# Patient Record
Sex: MALE | Race: WHITE | NOT HISPANIC OR LATINO | Employment: OTHER | URBAN - NONMETROPOLITAN AREA
[De-identification: names, ages, dates, MRNs, and addresses within clinical notes are randomized per-mention and may not be internally consistent; named-entity substitution may affect disease eponyms.]

---

## 2017-01-03 ENCOUNTER — CLINICAL SUPPORT (OUTPATIENT)
Dept: FAMILY MEDICINE CLINIC | Facility: CLINIC | Age: 67
End: 2017-01-03

## 2017-01-03 DIAGNOSIS — E53.8 COBALAMIN DEFICIENCY: Primary | ICD-10-CM

## 2017-01-03 PROCEDURE — 96372 THER/PROPH/DIAG INJ SC/IM: CPT | Performed by: INTERNAL MEDICINE

## 2017-01-03 RX ADMIN — CYANOCOBALAMIN 1000 MCG: 1000 INJECTION, SOLUTION INTRAMUSCULAR; SUBCUTANEOUS at 09:51

## 2017-01-16 ENCOUNTER — TELEPHONE (OUTPATIENT)
Dept: FAMILY MEDICINE CLINIC | Facility: CLINIC | Age: 67
End: 2017-01-16

## 2017-01-16 RX ORDER — AMLODIPINE BESYLATE 10 MG/1
10 TABLET ORAL DAILY
Qty: 90 TABLET | Refills: 3 | Status: SHIPPED | OUTPATIENT
Start: 2017-01-16 | End: 2017-12-26 | Stop reason: SDUPTHER

## 2017-01-16 RX ORDER — SPIRONOLACTONE 25 MG/1
12.5 TABLET ORAL DAILY
Qty: 45 TABLET | Refills: 3 | Status: SHIPPED | OUTPATIENT
Start: 2017-01-16 | End: 2017-01-30 | Stop reason: DRUGHIGH

## 2017-01-16 NOTE — TELEPHONE ENCOUNTER
Requested Refills Sent    ----- Message from Janice Burrell sent at 1/16/2017 10:41 AM CST -----  Regarding: BRANDIN DUBOIS  Contact: [phone]  NETTA CONN IS NEEDING REFILLS ON AMLODIPINE AND SPIRONALACTONE TO BE SENT TO [facility]

## 2017-01-20 ENCOUNTER — OFFICE VISIT (OUTPATIENT)
Dept: OPHTHALMOLOGY | Facility: CLINIC | Age: 67
End: 2017-01-20

## 2017-01-20 DIAGNOSIS — I67.1 CAROTID-CAVERNOUS FISTULA: ICD-10-CM

## 2017-01-20 DIAGNOSIS — H40.41X1 GLAUCOMA OF RIGHT EYE SECONDARY TO EYE INFLAMMATION, MILD STAGE: Primary | ICD-10-CM

## 2017-01-20 PROCEDURE — 99212 OFFICE O/P EST SF 10 MIN: CPT | Performed by: OPHTHALMOLOGY

## 2017-01-20 NOTE — MR AVS SNAPSHOT
"                        Beka Zavaleta   1/20/2017 2:15 PM   Office Visit    Dept Phone:  [phone]   Encounter #:  [id number]    Provider:  Sami Lopez MD   Department:  [facility] OPHTHALMOLOGY                Your Full Care Plan              Today's Medication Changes          These changes are accurate as of: 1/20/17  1:59 PM.  If you have any questions, ask your nurse or doctor.               Stop taking medication(s)listed here:     brimonidine-timolol 0.2-0.5 % ophthalmic solution   Commonly known as:  COMBIGAN   Stopped by:  Sami Lopez MD                      Your Updated Medication List          This list is accurate as of: 1/20/17  1:59 PM.  Always use your most recent med list.                ACCU-CHEK ENDER test strip   Generic drug:  glucose blood       amLODIPine 10 MG tablet   Commonly known as:  NORVASC   Take 1 tablet by mouth Daily.       aspirin 81 MG tablet       atenolol 100 MG tablet   Commonly known as:  TENORMIN   Take 1 tablet by mouth daily. REPLACES THE COMBO MED DUE TO DOSING       atorvastatin 80 MG tablet   Commonly known as:  LIPITOR   Take 1 tablet by mouth Daily.       carBAMazepine  MG 12 hr tablet   Commonly known as:  TEGretol  XR   TAKE 4 TABLETS BY MOUTH EVERY DAY IN THE MORNING, 3 TABLETS EVERY DAY AT SUPPER, AND 3 TABLETS AT NIGHT       colestipol 1 G tablet   Commonly known as:  COLESTID       doxazosin 4 MG tablet   Commonly known as:  CARDURA       ICAPS PO       insulin glargine 100 UNIT/ML injection   Commonly known as:  LANTUS   Inject 176 Units under the skin Daily. (Dosage Increased due to being on Prednisone)       Insulin Syringe-Needle U-100 30G X 7/16\" 1 ML misc       isosorbide mononitrate 30 MG 24 hr tablet   Commonly known as:  IMDUR       lisinopril 40 MG tablet   Commonly known as:  PRINIVIL,ZESTRIL       metFORMIN 500 MG tablet   Commonly known as:  GLUCOPHAGE       nitroglycerin 0.4 MG SL tablet   Commonly known " as:  NITROSTAT       OMEGA-3 FATTY ACIDS-VITAMIN E PO       omeprazole 40 MG capsule   Commonly known as:  priLOSEC   Take 1 capsule by mouth daily.       prasugrel 5 MG tablet   Commonly known as:  EFFIENT       RANEXA 1000 MG 12 hr tablet   Generic drug:  ranolazine       RELION ULTRA THIN PLUS LANCETS misc       SITagliptin 100 MG tablet   Commonly known as:  JANUVIA   Take 1 tablet by mouth Daily.       spironolactone 25 MG tablet   Commonly known as:  ALDACTONE   Take 0.5 tablets by mouth Daily.       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, mild stage    -  Primary ICD-10-CM: H40.41X1  ICD-9-CM: 365.62, 365.71 IOP down    Carotid-cavernous fistula     ICD-10-CM: I77.0  ICD-9-CM: 447.0 low flow improved,  chemosis, 6th CNP resolved;       Medications to be Given to You by a Medical Professional     Due       Frequency    2017 cyanocobalamin injection 1,000 mcg  Every 28 Days      Instructions     None    Patient Instructions History      Upcoming Appointments     Visit Type Date Time Department    OFFICE VISIT 2017  2:15 PM [facility] OPHTHALMOLOGY MAD    FOLLOW UP 2017  8:45 AM [facility] FAM MED MAD 4TH    OFFICE VISIT 2017  8:45 AM [facility] OPHTHALMOLOGY MAD      [facility] Signup     [facility] "[employer] allows you to send messages to your doctor, view your test results, renew your prescriptions, schedule appointments, and more. To sign up, go to [employer] and click on the Sign Up Now link in the New User? box. Enter your "[employer] Activation Code exactly as it appears below along with the last four digits of your Social Security Number and your Date of Birth () to complete the sign-up process. If you do not sign up before the expiration date, you must request a new code.    "[employer] Activation Code: [email or web address]  Expires:  2/3/2017  1:59 PM    If you have questions, you can email [email or web address] or call [phone] to talk to our MyChart staff. Remember, MyChart is NOT to be used for urgent needs. For medical emergencies, dial 911.               Other Info from Your Visit           Your Appointments     Jan 20, 2017  2:15 PM CST   Office Visit with Sami Lopez MD   [facility] OPHTHALMOLOGY (--)    [address] [place] [address] [address]  [address] [place] [id number] [phone]           Arrive 15 minutes prior to appointment.            Feb 13, 2017  8:45 AM CST   Follow Up with Fede Hawkins MD   [facility] FAMILY MEDICINE (--)    [address] [place] [address] [address]  [address] [place] [id number] [phone]           Arrive 15 minutes prior to appointment.            Feb 20, 2017  8:45 AM CST   Office Visit with Sami Lopez MD   [facility] OPHTHALMOLOGY (--)    [address] [place] [address] [address]  [address] [place] [id number] [phone]           Arrive 15 minutes prior to appointment.              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

## 2017-01-20 NOTE — PROGRESS NOTES
Subjective   Beka Zavaleta is a 66 y.o. male.   Chief Complaint   Patient presents with   • Carotid artery covernous sinus fistula     HPI     F/u CC fistula, no diplopia       Last edited by Sami Lopez MD on 1/20/2017  1:54 PM.       Review of Systems    Objective   Visual Acuity (Snellen - Linear)      Right Left   Dist cc 20/40 20/30       Correction:  Glasses                Not recorded            Tonometry      Right Left   Pressure 14               Main Ophthalmology Exam     External Exam      Right Left    External Normal Normal      Slit Lamp Exam      Right Left    Lids/Lashes Normal Normal    Conjunctiva/Sclera White and quiet, no chemosis White and quiet, scar at 10:00    Cornea Clear Clear    Anterior Chamber Deep and quiet Deep and quiet    Iris Round and reactive Round and reactive iris drawn nasal    Lens 1+ Nuclear sclerosis, 2+ Cortical cataract 1+ Nuclear sclerosis, 2+ Cortical cataract    Vitreous Normal Normal                Assessment/Plan   Beka was seen today for carotid artery covernous sinus fistula.    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

## 2017-01-30 ENCOUNTER — TELEPHONE (OUTPATIENT)
Dept: FAMILY MEDICINE CLINIC | Facility: CLINIC | Age: 67
End: 2017-01-30

## 2017-01-30 RX ORDER — SPIRONOLACTONE 50 MG/1
25 TABLET, FILM COATED ORAL
COMMUNITY
End: 2017-03-15 | Stop reason: CLARIF

## 2017-01-30 RX ORDER — CARBAMAZEPINE 100 MG/1
TABLET, EXTENDED RELEASE ORAL
Qty: 300 TABLET | Refills: 5 | Status: SHIPPED | OUTPATIENT
Start: 2017-01-30 | End: 2017-05-12 | Stop reason: CLARIF

## 2017-01-30 RX ORDER — DOXAZOSIN MESYLATE 4 MG/1
4 TABLET ORAL NIGHTLY
Qty: 90 TABLET | Refills: 3 | Status: SHIPPED | OUTPATIENT
Start: 2017-01-30 | End: 2018-01-31 | Stop reason: SDUPTHER

## 2017-01-30 RX ORDER — PRASUGREL 10 MG/1
10 TABLET, FILM COATED ORAL
COMMUNITY
End: 2018-01-09 | Stop reason: CLARIF

## 2017-01-30 NOTE — TELEPHONE ENCOUNTER
I have called the Pharmacy.  The Tegretol XR does not come in capsule form.  I called the patient to see what they were needing and she said the Pharmacy told her to call for a script for capsules.  But it does not come that way.   Refills sent for his usual refills in tablet form.     ----- Message from Janice Burrell sent at 1/30/2017  9:04 AM CST -----  Regarding: BRANDIN DUBOIS  Contact: [phone]  NETTA CONN IS NEEDING A NEW PRESP FOR HIS TREGRETOL...IT NO LONGER COMES IN TAB.he NEEDS A PRESP FOR CAPSULES.Also, NEEDS REFILL ON DOXAZOSIN 4MG...PLEASE SEND TO [facility] TODAY..

## 2017-02-01 ENCOUNTER — CLINICAL SUPPORT (OUTPATIENT)
Dept: FAMILY MEDICINE CLINIC | Facility: CLINIC | Age: 67
End: 2017-02-01

## 2017-02-01 DIAGNOSIS — E53.8 COBALAMIN DEFICIENCY: Primary | ICD-10-CM

## 2017-02-01 PROCEDURE — 96372 THER/PROPH/DIAG INJ SC/IM: CPT | Performed by: INTERNAL MEDICINE

## 2017-02-01 RX ADMIN — CYANOCOBALAMIN 1000 MCG: 1000 INJECTION, SOLUTION INTRAMUSCULAR; SUBCUTANEOUS at 14:12

## 2017-02-02 ENCOUNTER — TELEPHONE (OUTPATIENT)
Dept: FAMILY MEDICINE CLINIC | Facility: CLINIC | Age: 67
End: 2017-02-02

## 2017-02-02 RX ORDER — ISOSORBIDE MONONITRATE 30 MG/1
30 TABLET, EXTENDED RELEASE ORAL DAILY
Qty: 30 TABLET | Refills: 5 | Status: SHIPPED | OUTPATIENT
Start: 2017-02-02

## 2017-02-02 RX ORDER — OMEPRAZOLE 40 MG/1
40 CAPSULE, DELAYED RELEASE ORAL DAILY
Qty: 30 CAPSULE | Refills: 5 | Status: SHIPPED | OUTPATIENT
Start: 2017-02-02 | End: 2017-08-17 | Stop reason: SDUPTHER

## 2017-02-02 RX ORDER — LISINOPRIL 40 MG/1
40 TABLET ORAL 2 TIMES DAILY
Qty: 60 TABLET | Refills: 5 | Status: SHIPPED | OUTPATIENT
Start: 2017-02-02 | End: 2017-07-05 | Stop reason: SDUPTHER

## 2017-02-02 RX ORDER — ATENOLOL 100 MG/1
100 TABLET ORAL DAILY
Qty: 30 TABLET | Refills: 5 | Status: SHIPPED | OUTPATIENT
Start: 2017-02-02 | End: 2017-02-15 | Stop reason: SDUPTHER

## 2017-02-02 NOTE — TELEPHONE ENCOUNTER
----- Message from Monica Pearson sent at 2/2/2017  8:36 AM CST -----  Regarding: rx  Contact: [phone]  Ross Grubbs,/ Needs his Omeprazole called to the [facility] in [place] please/

## 2017-02-13 ENCOUNTER — OFFICE VISIT (OUTPATIENT)
Dept: FAMILY MEDICINE CLINIC | Facility: CLINIC | Age: 67
End: 2017-02-13

## 2017-02-13 VITALS
WEIGHT: 225.2 LBS | HEART RATE: 64 BPM | DIASTOLIC BLOOD PRESSURE: 74 MMHG | BODY MASS INDEX: 30.54 KG/M2 | OXYGEN SATURATION: 99 % | SYSTOLIC BLOOD PRESSURE: 130 MMHG

## 2017-02-13 DIAGNOSIS — I25.10 ATHEROSCLEROSIS OF NATIVE CORONARY ARTERY OF NATIVE HEART WITHOUT ANGINA PECTORIS: ICD-10-CM

## 2017-02-13 DIAGNOSIS — Z13.6 ENCOUNTER FOR ABDOMINAL AORTIC ANEURYSM (AAA) SCREENING: Primary | ICD-10-CM

## 2017-02-13 DIAGNOSIS — I10 ESSENTIAL HYPERTENSION: ICD-10-CM

## 2017-02-13 DIAGNOSIS — E11.65 UNCONTROLLED TYPE 2 DIABETES MELLITUS WITH HYPERGLYCEMIA, WITH LONG-TERM CURRENT USE OF INSULIN (HCC): ICD-10-CM

## 2017-02-13 DIAGNOSIS — Z79.4 UNCONTROLLED TYPE 2 DIABETES MELLITUS WITH HYPERGLYCEMIA, WITH LONG-TERM CURRENT USE OF INSULIN (HCC): ICD-10-CM

## 2017-02-13 DIAGNOSIS — Z86.69 HISTORY OF TRIGEMINAL NEURALGIA: ICD-10-CM

## 2017-02-13 DIAGNOSIS — G47.33 OBSTRUCTIVE SLEEP APNEA SYNDROME: ICD-10-CM

## 2017-02-13 PROCEDURE — 99214 OFFICE O/P EST MOD 30 MIN: CPT | Performed by: FAMILY MEDICINE

## 2017-02-13 NOTE — PROGRESS NOTES
Subjective   Beka Zavaleta is a 66 y.o. male.     Hypertension   This is a chronic problem. The problem is unchanged. The problem is controlled. Pertinent negatives include no chest pain, orthopnea, palpitations, peripheral edema, PND or shortness of breath.   Diabetes   He presents for his follow-up diabetic visit. He has type 2 diabetes mellitus. His disease course has been fluctuating. Pertinent negatives for hypoglycemia include no confusion or hunger. Pertinent negatives for diabetes include no chest pain, no foot paresthesias, no foot ulcerations, no polydipsia, no polyuria and no weight loss. His breakfast blood glucose is taken between 7-8 am. His breakfast blood glucose range is generally 140-180 mg/dl. His highest blood glucose is >200 mg/dl. An ACE inhibitor/angiotensin II receptor blocker is being taken. Eye exam is current.   Coronary Artery Disease   Presents for follow-up visit. Pertinent negatives include no chest pain, chest pressure, chest tightness, leg swelling, palpitations or shortness of breath.        The following portions of the patient's history were reviewed and updated as appropriate: allergies, current medications, past family history, past medical history, past social history, past surgical history and problem list.    Review of Systems   Constitutional: Negative for weight loss.   Respiratory: Negative for chest tightness and shortness of breath.    Cardiovascular: Negative for chest pain, palpitations, orthopnea, leg swelling and PND.   Endocrine: Negative for polydipsia and polyuria.   Psychiatric/Behavioral: Negative for confusion.       Objective   Physical Exam   Constitutional: He is oriented to person, place, and time. He appears well-developed and well-nourished. No distress.   HENT:   Head: Normocephalic and atraumatic.   Cardiovascular: Normal rate, regular rhythm, normal heart sounds and intact distal pulses.  Exam reveals no gallop and no friction rub.    No murmur  heard.  Pulmonary/Chest: Effort normal and breath sounds normal. No respiratory distress. He has no wheezes. He has no rales. He exhibits no tenderness.   Musculoskeletal: He exhibits no edema.    Beka had a diabetic foot exam performed (callus noted) today.   During the foot exam he had a monofilament test performed (normal).    Vascular Status -  His exam exhibits right foot vasculature abnormal and no right foot edema. His exam exhibits left foot vasculature abnormal and no left foot edema.  Neurological: He is alert and oriented to person, place, and time.   Skin: Skin is warm and dry. He is not diaphoretic.   Psychiatric: He has a normal mood and affect. His behavior is normal. Judgment and thought content normal.   Nursing note and vitals reviewed.      Assessment/Plan   Problems Addressed this Visit        Cardiovascular and Mediastinum    Essential hypertension    Coronary atherosclerosis       Respiratory    Obstructive sleep apnea syndrome       Endocrine    Type II diabetes mellitus, uncontrolled       Other    History of trigeminal neuralgia      Other Visit Diagnoses     Encounter for abdominal aortic aneurysm (AAA) screening    -  Primary    Relevant Orders    US Abdominal Aorta Limited               Has been on  Tegretol since 1990's for trigeminal neuralgia. Needs PA

## 2017-02-15 ENCOUNTER — TELEPHONE (OUTPATIENT)
Dept: FAMILY MEDICINE CLINIC | Facility: CLINIC | Age: 67
End: 2017-02-15

## 2017-02-15 RX ORDER — ATENOLOL 100 MG/1
100 TABLET ORAL DAILY
Qty: 90 TABLET | Refills: 3 | Status: SHIPPED | OUTPATIENT
Start: 2017-02-15 | End: 2018-02-15 | Stop reason: SDUPTHER

## 2017-02-15 NOTE — TELEPHONE ENCOUNTER
Requested Refills Sent    ---- Message from Isabella Crowell sent at 2/15/2017 10:25 AM CST -----  Contact: [phone]  BRANDIN DUBOIS- HE NEEDS PRES FOR HIS ATENOLOL-WOULD LIKE 90 DAY SUPPLY SENT TO [facility] IN [place] AND CAN BE REACHED [phone]

## 2017-02-23 ENCOUNTER — OFFICE VISIT (OUTPATIENT)
Dept: OPHTHALMOLOGY | Facility: CLINIC | Age: 67
End: 2017-02-23

## 2017-02-23 ENCOUNTER — HOSPITAL ENCOUNTER (OUTPATIENT)
Dept: ULTRASOUND IMAGING | Facility: HOSPITAL | Age: 67
Discharge: HOME | End: 2017-02-23
Attending: FAMILY MEDICINE | Admitting: FAMILY MEDICINE

## 2017-02-23 DIAGNOSIS — Z13.6 ENCOUNTER FOR ABDOMINAL AORTIC ANEURYSM (AAA) SCREENING: ICD-10-CM

## 2017-02-23 DIAGNOSIS — IMO0002 NUCLEAR CATARACT, BILATERAL: ICD-10-CM

## 2017-02-23 DIAGNOSIS — H40.41X1 GLAUCOMA OF RIGHT EYE SECONDARY TO EYE INFLAMMATION, MILD STAGE: Primary | ICD-10-CM

## 2017-02-23 DIAGNOSIS — H25.013 CORTICAL AGE-RELATED CATARACT, BILATERAL: ICD-10-CM

## 2017-02-23 PROCEDURE — 99212 OFFICE O/P EST SF 10 MIN: CPT | Performed by: OPHTHALMOLOGY

## 2017-02-23 PROCEDURE — 76775 US EXAM ABDO BACK WALL LIM: CPT

## 2017-02-23 NOTE — PROGRESS NOTES
Subjective   Beka Zavaleta is a 66 y.o. male.   Chief Complaint   Patient presents with   • Cataract   • Glaucoma     HPI     Cataract   Laterality: both eyes   Course: stable           Glaucoma   Laterality: right eye           Comments   No vision change; using Travatan OD; hx of low flow CC fistula OD with 6 CNP, resolved       Last edited by Sami Lopez MD on 2/23/2017 11:18 AM. (History)          Review of Systems    Objective   Visual Acuity (Snellen - Linear)      Right Left   Dist cc 20/25 20/25       Correction:  Glasses                Not recorded            Tonometry (Applanation, 11:15 AM)      Right Left   Pressure 15 19              Main Ophthalmology Exam     External Exam      Right Left    External Normal Normal      Slit Lamp Exam      Right Left    Lids/Lashes Normal Normal    Conjunctiva/Sclera White and quiet, no chemosis White and quiet, scar at 10:00    Cornea Clear Clear    Anterior Chamber Deep and quiet Deep and quiet    Iris Round and reactive Round and reactive iris drawn nasal    Lens 1+ Nuclear sclerosis, 2+ Cortical cataract 1+ Nuclear sclerosis, 2+ Cortical cataract    Vitreous Normal Normal                Assessment/Plan   Diagnoses and all orders for this visit:    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

## 2017-03-02 ENCOUNTER — CLINICAL SUPPORT (OUTPATIENT)
Dept: FAMILY MEDICINE CLINIC | Facility: CLINIC | Age: 67
End: 2017-03-02

## 2017-03-02 DIAGNOSIS — D51.0 PERNICIOUS ANEMIA: Primary | ICD-10-CM

## 2017-03-02 PROCEDURE — 96372 THER/PROPH/DIAG INJ SC/IM: CPT | Performed by: INTERNAL MEDICINE

## 2017-03-02 RX ADMIN — CYANOCOBALAMIN 1000 MCG: 1000 INJECTION, SOLUTION INTRAMUSCULAR; SUBCUTANEOUS at 10:28

## 2017-03-15 ENCOUNTER — TELEPHONE (OUTPATIENT)
Dept: FAMILY MEDICINE CLINIC | Facility: CLINIC | Age: 67
End: 2017-03-15

## 2017-03-15 RX ORDER — SPIRONOLACTONE 25 MG/1
25 TABLET ORAL DAILY
Qty: 90 TABLET | Refills: 3 | Status: SHIPPED | OUTPATIENT
Start: 2017-03-15 | End: 2018-05-21 | Stop reason: SDUPTHER

## 2017-03-15 NOTE — TELEPHONE ENCOUNTER
Requested Refills Sent  Patient called to confirm that he is taking Spironolactone 25 mg Take 1 tablet daily.   New Script sent.     ---- Message from Isabella Crowell sent at 3/15/2017 10:15 AM CDT -----  Contact: [phone]  BRANDIN SOTO-WIFE CALLED FOR HIM AND SAID HE NEEDS PRES FOR SPIRONOLACTONE 25MG-SAID HE WAS TAKING 1/2 PILL A DAY AND NOW HE TAKES 1 WHOLE PILL A DAY-USES [facility] IN [place] AND CAN BE REACHED [phone] IF ANY QUESTIONS.

## 2017-03-23 ENCOUNTER — OFFICE VISIT (OUTPATIENT)
Dept: OPHTHALMOLOGY | Facility: CLINIC | Age: 67
End: 2017-03-23

## 2017-03-23 DIAGNOSIS — IMO0002 NUCLEAR CATARACT, BILATERAL: ICD-10-CM

## 2017-03-23 DIAGNOSIS — H40.41X1 GLAUCOMA OF RIGHT EYE SECONDARY TO EYE INFLAMMATION, MILD STAGE: Primary | ICD-10-CM

## 2017-03-23 DIAGNOSIS — H25.013 CORTICAL AGE-RELATED CATARACT, BILATERAL: ICD-10-CM

## 2017-03-23 PROCEDURE — 99212 OFFICE O/P EST SF 10 MIN: CPT | Performed by: OPHTHALMOLOGY

## 2017-03-23 NOTE — PROGRESS NOTES
Subjective   Beka Zavaleta is a 66 y.o. male.   Chief Complaint   Patient presents with   • Follow-up     HPI     Stopped Travatan Z, no complaints, hx of low flow CC fistula OD       Last edited by Sami Lopez MD on 3/23/2017  8:37 AM.       Review of Systems    Objective   Visual Acuity (Snellen - Linear)      Right Left   Dist cc 20/25 20/25       Correction:  Glasses         Wearing Rx      Sphere Cylinder Axis Add   Right -1.00 +1.00 029 +2.25   Left -0.25 +0.50 139 +2.25                  Not recorded            Tonometry (Applanation, 8:34 AM)      Right Left   Pressure 19 17              Main Ophthalmology Exam     External Exam      Right Left    External Normal Normal      Slit Lamp Exam      Right Left    Lids/Lashes Normal Normal    Conjunctiva/Sclera White and quiet, no chemosis White and quiet, scar at 10:00    Cornea Clear Clear    Anterior Chamber Deep and quiet Deep and quiet    Iris Round and reactive Round and reactive iris drawn nasal    Lens 1+ Nuclear sclerosis, 2+ Cortical cataract 1+ Nuclear sclerosis, 2+ Cortical cataract    Vitreous Normal Normal                Assessment/Plan   Diagnoses and all orders for this visit:    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

## 2017-03-25 ENCOUNTER — APPOINTMENT (OUTPATIENT)
Dept: GENERAL RADIOLOGY | Age: 67
End: 2017-03-25
Payer: MEDICARE

## 2017-03-25 ENCOUNTER — HOSPITAL ENCOUNTER (EMERGENCY)
Age: 67
Discharge: HOME | End: 2017-03-25
Attending: EMERGENCY MEDICINE
Payer: MEDICARE

## 2017-03-25 VITALS
HEART RATE: 78 BPM | OXYGEN SATURATION: 99 % | DIASTOLIC BLOOD PRESSURE: 83 MMHG | BODY MASS INDEX: 30.48 KG/M2 | WEIGHT: 225 LBS | HEIGHT: 72 IN | TEMPERATURE: 98.3 F | SYSTOLIC BLOOD PRESSURE: 144 MMHG | RESPIRATION RATE: 18 BRPM

## 2017-03-25 DIAGNOSIS — I49.1 PAC (PREMATURE ATRIAL CONTRACTION): ICD-10-CM

## 2017-03-25 DIAGNOSIS — R00.2 PALPITATIONS: Primary | ICD-10-CM

## 2017-03-25 LAB
ALBUMIN SERPL-MCNC: 4.3 G/DL (ref 3.5–5.2)
ALP BLD-CCNC: 64 U/L (ref 40–130)
ALT SERPL-CCNC: 22 U/L (ref 5–41)
ANION GAP SERPL CALCULATED.3IONS-SCNC: 13 MMOL/L (ref 7–19)
AST SERPL-CCNC: 22 U/L (ref 5–40)
BASOPHILS ABSOLUTE: 0 K/UL (ref 0–0.2)
BASOPHILS RELATIVE PERCENT: 0.2 % (ref 0–1)
BILIRUB SERPL-MCNC: 0.3 MG/DL (ref 0.2–1.2)
BUN BLDV-MCNC: 19 MG/DL (ref 8–23)
CALCIUM SERPL-MCNC: 8.8 MG/DL (ref 8.8–10.2)
CHLORIDE BLD-SCNC: 104 MMOL/L (ref 98–111)
CO2: 24 MMOL/L (ref 22–29)
CREAT SERPL-MCNC: 0.8 MG/DL (ref 0.5–1.2)
EOSINOPHILS ABSOLUTE: 0.1 K/UL (ref 0–0.6)
EOSINOPHILS RELATIVE PERCENT: 0.9 % (ref 0–5)
GFR NON-AFRICAN AMERICAN: >60
GLOBULIN: 2.7 G/DL
GLUCOSE BLD-MCNC: 203 MG/DL (ref 74–109)
HCT VFR BLD CALC: 41.7 % (ref 42–52)
HEMOGLOBIN: 14.1 G/DL (ref 14–18)
INR BLD: 0.97 (ref 0.88–1.18)
LYMPHOCYTES ABSOLUTE: 2.1 K/UL (ref 1.1–4.5)
LYMPHOCYTES RELATIVE PERCENT: 38.3 % (ref 20–40)
MCH RBC QN AUTO: 32.4 PG (ref 27–31)
MCHC RBC AUTO-ENTMCNC: 33.8 G/DL (ref 33–37)
MCV RBC AUTO: 95.9 FL (ref 80–94)
MONOCYTES ABSOLUTE: 0.6 K/UL (ref 0–0.9)
MONOCYTES RELATIVE PERCENT: 11.7 % (ref 0–10)
NEUTROPHILS ABSOLUTE: 2.7 K/UL (ref 1.5–7.5)
NEUTROPHILS RELATIVE PERCENT: 48.7 % (ref 50–65)
PDW BLD-RTO: 12.3 % (ref 11.5–14.5)
PERFORMED ON: NORMAL
PERFORMED ON: NORMAL
PLATELET # BLD: 160 K/UL (ref 130–400)
PMV BLD AUTO: 10.8 FL (ref 7.4–10.4)
POC TROPONIN I: 0 NG/ML (ref 0–0.08)
POC TROPONIN I: 0.01 NG/ML (ref 0–0.08)
POTASSIUM SERPL-SCNC: 4.2 MMOL/L (ref 3.5–5)
PRO-BNP: 104 PG/ML (ref 0–900)
PROTHROMBIN TIME: 12.9 SEC (ref 12–14.6)
RBC # BLD: 4.35 M/UL (ref 4.7–6.1)
SODIUM BLD-SCNC: 141 MMOL/L (ref 136–145)
TOTAL PROTEIN: 7 G/DL (ref 6.6–8.7)
WBC # BLD: 5.5 K/UL (ref 4.8–10.8)

## 2017-03-25 PROCEDURE — 71020 XR CHEST STANDARD TWO VW: CPT

## 2017-03-25 PROCEDURE — 85025 COMPLETE CBC W/AUTO DIFF WBC: CPT

## 2017-03-25 PROCEDURE — 83880 ASSAY OF NATRIURETIC PEPTIDE: CPT

## 2017-03-25 PROCEDURE — 2500000003 HC RX 250 WO HCPCS: Performed by: EMERGENCY MEDICINE

## 2017-03-25 PROCEDURE — 84484 ASSAY OF TROPONIN QUANT: CPT

## 2017-03-25 PROCEDURE — 99283 EMERGENCY DEPT VISIT LOW MDM: CPT | Performed by: EMERGENCY MEDICINE

## 2017-03-25 PROCEDURE — 6370000000 HC RX 637 (ALT 250 FOR IP): Performed by: EMERGENCY MEDICINE

## 2017-03-25 PROCEDURE — 96374 THER/PROPH/DIAG INJ IV PUSH: CPT

## 2017-03-25 PROCEDURE — 85610 PROTHROMBIN TIME: CPT

## 2017-03-25 PROCEDURE — 36415 COLL VENOUS BLD VENIPUNCTURE: CPT

## 2017-03-25 PROCEDURE — 93005 ELECTROCARDIOGRAM TRACING: CPT

## 2017-03-25 PROCEDURE — 80053 COMPREHEN METABOLIC PANEL: CPT

## 2017-03-25 PROCEDURE — 99285 EMERGENCY DEPT VISIT HI MDM: CPT

## 2017-03-25 RX ORDER — PRASUGREL 10 MG/1
10 TABLET, FILM COATED ORAL DAILY
COMMUNITY
End: 2020-10-07

## 2017-03-25 RX ORDER — CHLORAL HYDRATE 500 MG
3000 CAPSULE ORAL 2 TIMES DAILY
COMMUNITY

## 2017-03-25 RX ORDER — MONTELUKAST SODIUM 4 MG/1
1 TABLET, CHEWABLE ORAL 2 TIMES DAILY
COMMUNITY

## 2017-03-25 RX ORDER — ATENOLOL 100 MG/1
100 TABLET ORAL DAILY
COMMUNITY

## 2017-03-25 RX ORDER — ASPIRIN 81 MG/1
81 TABLET ORAL DAILY
COMMUNITY

## 2017-03-25 RX ORDER — ISOSORBIDE MONONITRATE 30 MG/1
15 TABLET, EXTENDED RELEASE ORAL DAILY
COMMUNITY

## 2017-03-25 RX ORDER — CARBAMAZEPINE 100 MG/1
300 TABLET, CHEWABLE ORAL
COMMUNITY

## 2017-03-25 RX ORDER — LISINOPRIL 40 MG/1
40 TABLET ORAL 2 TIMES DAILY
Status: ON HOLD | COMMUNITY
End: 2020-10-09 | Stop reason: SDUPTHER

## 2017-03-25 RX ORDER — AMLODIPINE BESYLATE 10 MG/1
10 TABLET ORAL DAILY
COMMUNITY

## 2017-03-25 RX ORDER — CARBAMAZEPINE 100 MG/1
300 TABLET, CHEWABLE ORAL NIGHTLY
Status: ON HOLD | COMMUNITY
End: 2020-10-09 | Stop reason: HOSPADM

## 2017-03-25 RX ORDER — ATORVASTATIN CALCIUM 80 MG/1
80 TABLET, FILM COATED ORAL DAILY
COMMUNITY

## 2017-03-25 RX ORDER — SPIRONOLACTONE 25 MG/1
25 TABLET ORAL DAILY
COMMUNITY

## 2017-03-25 RX ORDER — OMEPRAZOLE 20 MG/1
40 CAPSULE, DELAYED RELEASE ORAL DAILY
COMMUNITY

## 2017-03-25 RX ORDER — INSULIN GLARGINE 100 [IU]/ML
200 INJECTION, SOLUTION SUBCUTANEOUS
COMMUNITY

## 2017-03-25 RX ORDER — ASPIRIN 81 MG/1
324 TABLET, CHEWABLE ORAL ONCE
Status: COMPLETED | OUTPATIENT
Start: 2017-03-25 | End: 2017-03-25

## 2017-03-25 RX ORDER — DOXAZOSIN MESYLATE 4 MG/1
4 TABLET ORAL NIGHTLY
COMMUNITY

## 2017-03-25 RX ORDER — CARBAMAZEPINE 100 MG/1
100 TABLET, EXTENDED RELEASE ORAL
COMMUNITY

## 2017-03-25 RX ORDER — DOCUSATE SODIUM 100 MG/1
100 CAPSULE, LIQUID FILLED ORAL 2 TIMES DAILY
COMMUNITY

## 2017-03-25 RX ADMIN — METOPROLOL TARTRATE 5 MG: 5 INJECTION INTRAVENOUS at 06:27

## 2017-03-25 RX ADMIN — ASPIRIN 81 MG CHEWABLE TABLET 324 MG: 81 TABLET CHEWABLE at 06:27

## 2017-03-25 ASSESSMENT — ENCOUNTER SYMPTOMS
COUGH: 0
SHORTNESS OF BREATH: 0
ABDOMINAL PAIN: 0
NAUSEA: 0
VOMITING: 0

## 2017-03-28 LAB
EKG P AXIS: -21 DEGREES
EKG P AXIS: 27 DEGREES
EKG P-R INTERVAL: 128 MS
EKG P-R INTERVAL: 180 MS
EKG Q-T INTERVAL: 362 MS
EKG Q-T INTERVAL: 370 MS
EKG QRS DURATION: 90 MS
EKG QRS DURATION: 92 MS
EKG QTC CALCULATION (BAZETT): 388 MS
EKG QTC CALCULATION (BAZETT): 396 MS
EKG T AXIS: 33 DEGREES
EKG T AXIS: 39 DEGREES

## 2017-03-29 ENCOUNTER — OFFICE VISIT (OUTPATIENT)
Dept: FAMILY MEDICINE CLINIC | Facility: CLINIC | Age: 67
End: 2017-03-29

## 2017-03-29 ENCOUNTER — LAB (OUTPATIENT)
Dept: LAB | Facility: HOSPITAL | Age: 67
End: 2017-03-29

## 2017-03-29 VITALS
SYSTOLIC BLOOD PRESSURE: 130 MMHG | HEART RATE: 65 BPM | DIASTOLIC BLOOD PRESSURE: 84 MMHG | OXYGEN SATURATION: 99 % | BODY MASS INDEX: 30.65 KG/M2 | WEIGHT: 226 LBS

## 2017-03-29 DIAGNOSIS — R00.2 PALPITATION: ICD-10-CM

## 2017-03-29 DIAGNOSIS — E11.9 TYPE 2 DIABETES MELLITUS WITHOUT COMPLICATION, UNSPECIFIED LONG TERM INSULIN USE STATUS: ICD-10-CM

## 2017-03-29 DIAGNOSIS — R00.2 PALPITATION: Primary | ICD-10-CM

## 2017-03-29 LAB
ALBUMIN SERPL-MCNC: 4.3 G/DL (ref 3.4–4.8)
ALBUMIN/GLOB SERPL: 1.5 G/DL (ref 1.1–1.8)
ALP SERPL-CCNC: 68 U/L (ref 38–126)
ALT SERPL W P-5'-P-CCNC: 33 U/L (ref 21–72)
ANION GAP SERPL CALCULATED.3IONS-SCNC: 12 MMOL/L (ref 5–15)
AST SERPL-CCNC: 80 U/L (ref 17–59)
BASOPHILS # BLD AUTO: 0.01 10*3/MM3 (ref 0–0.2)
BASOPHILS NFR BLD AUTO: 0.2 % (ref 0–2)
BILIRUB SERPL-MCNC: 0.4 MG/DL (ref 0.2–1.3)
BUN BLD-MCNC: 16 MG/DL (ref 7–21)
BUN/CREAT SERPL: 17.4 (ref 7–25)
CALCIUM SPEC-SCNC: 9 MG/DL (ref 8.4–10.2)
CHLORIDE SERPL-SCNC: 102 MMOL/L (ref 95–110)
CO2 SERPL-SCNC: 25 MMOL/L (ref 22–31)
CREAT BLD-MCNC: 0.92 MG/DL (ref 0.7–1.3)
DEPRECATED RDW RBC AUTO: 41.2 FL (ref 35.1–43.9)
EOSINOPHIL # BLD AUTO: 0.05 10*3/MM3 (ref 0–0.7)
EOSINOPHIL NFR BLD AUTO: 0.8 % (ref 0–7)
ERYTHROCYTE [DISTWIDTH] IN BLOOD BY AUTOMATED COUNT: 12.1 % (ref 11.5–14.5)
GFR SERPL CREATININE-BSD FRML MDRD: 82 ML/MIN/1.73 (ref 49–113)
GLOBULIN UR ELPH-MCNC: 2.8 GM/DL (ref 2.3–3.5)
GLUCOSE BLD-MCNC: 163 MG/DL (ref 60–100)
HCT VFR BLD AUTO: 38.3 % (ref 39–49)
HGB BLD-MCNC: 13.4 G/DL (ref 13.7–17.3)
IMM GRANULOCYTES # BLD: 0.01 10*3/MM3 (ref 0–0.02)
IMM GRANULOCYTES NFR BLD: 0.2 % (ref 0–0.5)
LYMPHOCYTES # BLD AUTO: 2.17 10*3/MM3 (ref 0.6–4.2)
LYMPHOCYTES NFR BLD AUTO: 36.2 % (ref 10–50)
MAGNESIUM SERPL-MCNC: 1.7 MG/DL (ref 1.6–2.3)
MCH RBC QN AUTO: 32.4 PG (ref 26.5–34)
MCHC RBC AUTO-ENTMCNC: 35 G/DL (ref 31.5–36.3)
MCV RBC AUTO: 92.5 FL (ref 80–98)
MONOCYTES # BLD AUTO: 0.53 10*3/MM3 (ref 0–0.9)
MONOCYTES NFR BLD AUTO: 8.8 % (ref 0–12)
NEUTROPHILS # BLD AUTO: 3.22 10*3/MM3 (ref 2–8.6)
NEUTROPHILS NFR BLD AUTO: 53.8 % (ref 37–80)
PLATELET # BLD AUTO: 173 10*3/MM3 (ref 150–450)
PMV BLD AUTO: 10.8 FL (ref 8–12)
POTASSIUM BLD-SCNC: 4.4 MMOL/L (ref 3.5–5.1)
PROT SERPL-MCNC: 7.1 G/DL (ref 6.3–8.6)
RBC # BLD AUTO: 4.14 10*6/MM3 (ref 4.37–5.74)
SODIUM BLD-SCNC: 139 MMOL/L (ref 137–145)
T4 FREE SERPL-MCNC: 0.91 NG/DL (ref 0.78–2.19)
TSH SERPL DL<=0.05 MIU/L-ACNC: 0.69 MIU/ML (ref 0.46–4.68)
WBC NRBC COR # BLD: 5.99 10*3/MM3 (ref 3.2–9.8)

## 2017-03-29 PROCEDURE — 83735 ASSAY OF MAGNESIUM: CPT | Performed by: FAMILY MEDICINE

## 2017-03-29 PROCEDURE — 80053 COMPREHEN METABOLIC PANEL: CPT | Performed by: FAMILY MEDICINE

## 2017-03-29 PROCEDURE — 85025 COMPLETE CBC W/AUTO DIFF WBC: CPT | Performed by: FAMILY MEDICINE

## 2017-03-29 PROCEDURE — 99213 OFFICE O/P EST LOW 20 MIN: CPT | Performed by: FAMILY MEDICINE

## 2017-03-29 PROCEDURE — 84439 ASSAY OF FREE THYROXINE: CPT | Performed by: FAMILY MEDICINE

## 2017-03-29 PROCEDURE — 36415 COLL VENOUS BLD VENIPUNCTURE: CPT | Performed by: FAMILY MEDICINE

## 2017-03-29 PROCEDURE — 84443 ASSAY THYROID STIM HORMONE: CPT | Performed by: FAMILY MEDICINE

## 2017-03-29 RX ORDER — INSULIN GLARGINE 100 [IU]/ML
176 INJECTION, SOLUTION SUBCUTANEOUS DAILY
Qty: 12 ML | Refills: 3 | Status: ON HOLD | OUTPATIENT
Start: 2017-03-29 | End: 2017-04-12 | Stop reason: SDUPTHER

## 2017-03-29 NOTE — PROGRESS NOTES
Subjective   Beka Zavaleta is a 66 y.o. male.     Hypertension   This is a chronic problem. The current episode started more than 1 year ago. The problem is controlled. Associated symptoms include palpitations and shortness of breath. Pertinent negatives include no chest pain.   Palpitations    This is a recurrent (pACs in ER at [place]) problem. The current episode started 1 to 4 weeks ago. The problem occurs constantly. The problem has been waxing and waning. Nothing aggravates the symptoms. Associated symptoms include an irregular heartbeat and shortness of breath. Pertinent negatives include no chest pain, dizziness, near-syncope, syncope or weakness.        The following portions of the patient's history were reviewed and updated as appropriate: allergies, current medications, past family history, past medical history, past social history, past surgical history and problem list.    Review of Systems   Respiratory: Positive for shortness of breath.    Cardiovascular: Positive for palpitations. Negative for chest pain, syncope and near-syncope.   Neurological: Negative for dizziness and weakness.       Objective   Physical Exam   Constitutional: He is oriented to person, place, and time. He appears well-developed and well-nourished. No distress.   HENT:   Head: Normocephalic and atraumatic.   Cardiovascular: Normal rate, regular rhythm and normal heart sounds.  Exam reveals no gallop and no friction rub.    No murmur heard.  Pulmonary/Chest: Effort normal and breath sounds normal. No respiratory distress. He has no wheezes. He has no rales. He exhibits no tenderness.   Neurological: He is alert and oriented to person, place, and time.   Skin: Skin is warm and dry. He is not diaphoretic.   Psychiatric: He has a normal mood and affect. His behavior is normal. Judgment and thought content normal.   Nursing note and vitals reviewed.      Assessment/Plan   Problems Addressed this Visit        Cardiovascular and  Mediastinum    Palpitation - Primary    Relevant Orders    Comprehensive Metabolic Panel    CBC Auto Differential    TSH    T4, free    Magnesium      Other Visit Diagnoses     Type 2 diabetes mellitus without complication, unspecified long term insulin use status        Relevant Medications    insulin glargine (LANTUS) 100 UNIT/ML injection            PACs on EKG in ER at [place]. To see Cardiology in AM in [place]

## 2017-03-29 NOTE — PROGRESS NOTES
Subjective   Beka Zavaleta is a 66 y.o. male.     Hypertension   This is a chronic problem. The problem is unchanged. The problem is controlled. Pertinent negatives include no chest pain, orthopnea, palpitations, peripheral edema, PND or shortness of breath.   Diabetes   He presents for his follow-up diabetic visit. He has type 2 diabetes mellitus. His disease course has been fluctuating. Pertinent negatives for hypoglycemia include no confusion or hunger. Pertinent negatives for diabetes include no chest pain, no foot paresthesias, no foot ulcerations, no polydipsia, no polyuria and no weight loss. His breakfast blood glucose is taken between 7-8 am. His breakfast blood glucose range is generally 140-180 mg/dl. His highest blood glucose is >200 mg/dl. An ACE inhibitor/angiotensin II receptor blocker is being taken. Eye exam is current.   Coronary Artery Disease   Presents for follow-up visit. Pertinent negatives include no chest pain, chest pressure, chest tightness, leg swelling, palpitations or shortness of breath.        The following portions of the patient's history were reviewed and updated as appropriate: allergies, current medications, past family history, past medical history, past social history, past surgical history and problem list.    Review of Systems   Constitutional: Negative for weight loss.   Respiratory: Negative for chest tightness and shortness of breath.    Cardiovascular: Negative for chest pain, palpitations, orthopnea, leg swelling and PND.   Endocrine: Negative for polydipsia and polyuria.   Psychiatric/Behavioral: Negative for confusion.       Objective   Physical Exam   Constitutional: He is oriented to person, place, and time. He appears well-developed and well-nourished. No distress.   HENT:   Head: Normocephalic and atraumatic.   Cardiovascular: Normal rate, regular rhythm, normal heart sounds and intact distal pulses.  Exam reveals no gallop and no friction rub.    No murmur  heard.  Pulmonary/Chest: Effort normal and breath sounds normal. No respiratory distress. He has no wheezes. He has no rales. He exhibits no tenderness.   Musculoskeletal: He exhibits no edema.    Beka had a diabetic foot exam performed (callus noted) today.   During the foot exam he had a monofilament test performed (normal).    Vascular Status -  His exam exhibits right foot vasculature abnormal and no right foot edema. His exam exhibits left foot vasculature abnormal and no left foot edema.  Neurological: He is alert and oriented to person, place, and time.   Skin: Skin is warm and dry. He is not diaphoretic.   Psychiatric: He has a normal mood and affect. His behavior is normal. Judgment and thought content normal.   Nursing note and vitals reviewed.      Assessment/Plan   Problems Addressed this Visit     None               Has been on  Tegretol since 1990's for trigeminal neuralgia. Needs PA

## 2017-03-30 ENCOUNTER — TELEPHONE (OUTPATIENT)
Dept: FAMILY MEDICINE CLINIC | Facility: CLINIC | Age: 67
End: 2017-03-30

## 2017-03-30 NOTE — TELEPHONE ENCOUNTER
Pr Dr. Hawkins's instruction  Mr Zavaleta has been called ans advised that his labs look good.   Continue his current medications and follow-up as recommended.     ----- Message from Fede Hawkins MD sent at 3/30/2017  3:07 PM CDT -----  Ok, call or send card.

## 2017-04-04 ENCOUNTER — TELEPHONE (OUTPATIENT)
Dept: FAMILY MEDICINE CLINIC | Facility: CLINIC | Age: 67
End: 2017-04-04

## 2017-04-05 ENCOUNTER — CLINICAL SUPPORT (OUTPATIENT)
Dept: FAMILY MEDICINE CLINIC | Facility: CLINIC | Age: 67
End: 2017-04-05

## 2017-04-05 DIAGNOSIS — D51.0 PERNICIOUS ANEMIA: Primary | ICD-10-CM

## 2017-04-05 PROCEDURE — 96372 THER/PROPH/DIAG INJ SC/IM: CPT | Performed by: INTERNAL MEDICINE

## 2017-04-05 RX ADMIN — CYANOCOBALAMIN 1000 MCG: 1000 INJECTION, SOLUTION INTRAMUSCULAR; SUBCUTANEOUS at 09:01

## 2017-04-09 ENCOUNTER — APPOINTMENT (OUTPATIENT)
Dept: GENERAL RADIOLOGY | Facility: HOSPITAL | Age: 67
End: 2017-04-09

## 2017-04-09 ENCOUNTER — HOSPITAL ENCOUNTER (INPATIENT)
Facility: HOSPITAL | Age: 67
LOS: 3 days | Discharge: HOME | End: 2017-04-12
Attending: EMERGENCY MEDICINE | Admitting: INTERNAL MEDICINE

## 2017-04-09 DIAGNOSIS — L03.113 CELLULITIS OF RIGHT HAND: Primary | ICD-10-CM

## 2017-04-09 LAB
ALBUMIN SERPL-MCNC: 4.3 G/DL (ref 3.4–4.8)
ALBUMIN/GLOB SERPL: 1.6 G/DL (ref 1.1–1.8)
ALP SERPL-CCNC: 72 U/L (ref 38–126)
ALT SERPL W P-5'-P-CCNC: 32 U/L (ref 21–72)
ANION GAP SERPL CALCULATED.3IONS-SCNC: 13 MMOL/L (ref 5–15)
AST SERPL-CCNC: 21 U/L (ref 17–59)
BASOPHILS # BLD AUTO: 0.01 10*3/MM3 (ref 0–0.2)
BASOPHILS NFR BLD AUTO: 0.1 % (ref 0–2)
BILIRUB SERPL-MCNC: 0.5 MG/DL (ref 0.2–1.3)
BUN BLD-MCNC: 16 MG/DL (ref 7–21)
BUN/CREAT SERPL: 19.8 (ref 7–25)
CALCIUM SPEC-SCNC: 9.4 MG/DL (ref 8.4–10.2)
CHLORIDE SERPL-SCNC: 102 MMOL/L (ref 95–110)
CO2 SERPL-SCNC: 25 MMOL/L (ref 22–31)
CREAT BLD-MCNC: 0.81 MG/DL (ref 0.7–1.3)
D-LACTATE SERPL-SCNC: 1.4 MMOL/L (ref 0.5–2)
D-LACTATE SERPL-SCNC: 2.1 MMOL/L (ref 0.5–2)
DEPRECATED RDW RBC AUTO: 40.1 FL (ref 35.1–43.9)
EOSINOPHIL # BLD AUTO: 0.05 10*3/MM3 (ref 0–0.7)
EOSINOPHIL NFR BLD AUTO: 0.7 % (ref 0–7)
ERYTHROCYTE [DISTWIDTH] IN BLOOD BY AUTOMATED COUNT: 12.3 % (ref 11.5–14.5)
GFR SERPL CREATININE-BSD FRML MDRD: 95 ML/MIN/1.73 (ref 49–113)
GLOBULIN UR ELPH-MCNC: 2.7 GM/DL (ref 2.3–3.5)
GLUCOSE BLD-MCNC: 255 MG/DL (ref 60–100)
GLUCOSE BLDC GLUCOMTR-MCNC: 146 MG/DL (ref 70–130)
HCT VFR BLD AUTO: 37.6 % (ref 39–49)
HGB BLD-MCNC: 13.4 G/DL (ref 13.7–17.3)
HOLD SPECIMEN: NORMAL
IMM GRANULOCYTES # BLD: 0.01 10*3/MM3 (ref 0–0.02)
IMM GRANULOCYTES NFR BLD: 0.1 % (ref 0–0.5)
LYMPHOCYTES # BLD AUTO: 1.65 10*3/MM3 (ref 0.6–4.2)
LYMPHOCYTES NFR BLD AUTO: 24.2 % (ref 10–50)
MCH RBC QN AUTO: 32.1 PG (ref 26.5–34)
MCHC RBC AUTO-ENTMCNC: 35.6 G/DL (ref 31.5–36.3)
MCV RBC AUTO: 90 FL (ref 80–98)
MONOCYTES # BLD AUTO: 0.75 10*3/MM3 (ref 0–0.9)
MONOCYTES NFR BLD AUTO: 11 % (ref 0–12)
NEUTROPHILS # BLD AUTO: 4.34 10*3/MM3 (ref 2–8.6)
NEUTROPHILS NFR BLD AUTO: 63.9 % (ref 37–80)
PLATELET # BLD AUTO: 160 10*3/MM3 (ref 150–450)
PMV BLD AUTO: 9.5 FL (ref 8–12)
POTASSIUM BLD-SCNC: 4.4 MMOL/L (ref 3.5–5.1)
PROT SERPL-MCNC: 7 G/DL (ref 6.3–8.6)
RBC # BLD AUTO: 4.18 10*6/MM3 (ref 4.37–5.74)
SODIUM BLD-SCNC: 140 MMOL/L (ref 137–145)
WBC NRBC COR # BLD: 6.81 10*3/MM3 (ref 3.2–9.8)
WHOLE BLOOD HOLD SPECIMEN: NORMAL

## 2017-04-09 PROCEDURE — 63710000001 INSULIN DETEMIR PER 5 UNITS: Performed by: INTERNAL MEDICINE

## 2017-04-09 PROCEDURE — 63710000001 INSULIN ASPART PER 5 UNITS: Performed by: INTERNAL MEDICINE

## 2017-04-09 PROCEDURE — 25010000002 KETOROLAC TROMETHAMINE PER 15 MG: Performed by: INTERNAL MEDICINE

## 2017-04-09 PROCEDURE — 73130 X-RAY EXAM OF HAND: CPT

## 2017-04-09 PROCEDURE — 80053 COMPREHEN METABOLIC PANEL: CPT | Performed by: EMERGENCY MEDICINE

## 2017-04-09 PROCEDURE — 87040 BLOOD CULTURE FOR BACTERIA: CPT | Performed by: EMERGENCY MEDICINE

## 2017-04-09 PROCEDURE — 82962 GLUCOSE BLOOD TEST: CPT

## 2017-04-09 PROCEDURE — 83605 ASSAY OF LACTIC ACID: CPT | Performed by: EMERGENCY MEDICINE

## 2017-04-09 PROCEDURE — 99284 EMERGENCY DEPT VISIT MOD MDM: CPT

## 2017-04-09 PROCEDURE — 85025 COMPLETE CBC W/AUTO DIFF WBC: CPT | Performed by: EMERGENCY MEDICINE

## 2017-04-09 PROCEDURE — 25010000002 VANCOMYCIN PER 500 MG: Performed by: EMERGENCY MEDICINE

## 2017-04-09 PROCEDURE — 25010000002 VANCOMYCIN PER 500 MG: Performed by: INTERNAL MEDICINE

## 2017-04-09 RX ORDER — SODIUM CHLORIDE 9 MG/ML
100 INJECTION, SOLUTION INTRAVENOUS CONTINUOUS
Status: DISCONTINUED | OUTPATIENT
Start: 2017-04-09 | End: 2017-04-12 | Stop reason: HOSPADM

## 2017-04-09 RX ORDER — LISINOPRIL 20 MG/1
20 TABLET ORAL
Status: DISCONTINUED | OUTPATIENT
Start: 2017-04-09 | End: 2017-04-12 | Stop reason: HOSPADM

## 2017-04-09 RX ORDER — CARBAMAZEPINE 100 MG/1
100 TABLET, EXTENDED RELEASE ORAL EVERY 12 HOURS SCHEDULED
Status: DISCONTINUED | OUTPATIENT
Start: 2017-04-09 | End: 2017-04-10

## 2017-04-09 RX ORDER — DEXTROSE MONOHYDRATE 25 G/50ML
25 INJECTION, SOLUTION INTRAVENOUS
Status: DISCONTINUED | OUTPATIENT
Start: 2017-04-09 | End: 2017-04-10 | Stop reason: SDUPTHER

## 2017-04-09 RX ORDER — ONDANSETRON 2 MG/ML
4 INJECTION INTRAMUSCULAR; INTRAVENOUS ONCE
Status: DISCONTINUED | OUTPATIENT
Start: 2017-04-09 | End: 2017-04-09

## 2017-04-09 RX ORDER — MORPHINE SULFATE 4 MG/ML
4 INJECTION, SOLUTION INTRAMUSCULAR; INTRAVENOUS ONCE
Status: DISCONTINUED | OUTPATIENT
Start: 2017-04-09 | End: 2017-04-09

## 2017-04-09 RX ORDER — ISOSORBIDE MONONITRATE 30 MG/1
30 TABLET, EXTENDED RELEASE ORAL DAILY
Status: DISCONTINUED | OUTPATIENT
Start: 2017-04-09 | End: 2017-04-12 | Stop reason: HOSPADM

## 2017-04-09 RX ORDER — SPIRONOLACTONE 25 MG/1
25 TABLET ORAL DAILY
Status: DISCONTINUED | OUTPATIENT
Start: 2017-04-09 | End: 2017-04-12 | Stop reason: HOSPADM

## 2017-04-09 RX ORDER — PANTOPRAZOLE SODIUM 40 MG/1
40 TABLET, DELAYED RELEASE ORAL
Status: DISCONTINUED | OUTPATIENT
Start: 2017-04-10 | End: 2017-04-12 | Stop reason: HOSPADM

## 2017-04-09 RX ORDER — KETOROLAC TROMETHAMINE 30 MG/ML
15 INJECTION, SOLUTION INTRAMUSCULAR; INTRAVENOUS EVERY 6 HOURS PRN
Status: DISCONTINUED | OUTPATIENT
Start: 2017-04-09 | End: 2017-04-12 | Stop reason: HOSPADM

## 2017-04-09 RX ORDER — AMLODIPINE BESYLATE 10 MG/1
10 TABLET ORAL DAILY
Status: DISCONTINUED | OUTPATIENT
Start: 2017-04-09 | End: 2017-04-12 | Stop reason: HOSPADM

## 2017-04-09 RX ORDER — ATORVASTATIN CALCIUM 40 MG/1
80 TABLET, FILM COATED ORAL DAILY
Status: DISCONTINUED | OUTPATIENT
Start: 2017-04-09 | End: 2017-04-12 | Stop reason: HOSPADM

## 2017-04-09 RX ORDER — DOXAZOSIN 2 MG/1
4 TABLET ORAL NIGHTLY
Status: DISCONTINUED | OUTPATIENT
Start: 2017-04-09 | End: 2017-04-12 | Stop reason: HOSPADM

## 2017-04-09 RX ORDER — ATENOLOL 50 MG/1
100 TABLET ORAL DAILY
Status: DISCONTINUED | OUTPATIENT
Start: 2017-04-09 | End: 2017-04-12 | Stop reason: HOSPADM

## 2017-04-09 RX ORDER — ASPIRIN 81 MG/1
81 TABLET, CHEWABLE ORAL DAILY
Status: DISCONTINUED | OUTPATIENT
Start: 2017-04-09 | End: 2017-04-12 | Stop reason: HOSPADM

## 2017-04-09 RX ORDER — AMPICILLIN AND SULBACTAM 1; .5 G/1; G/1
1.5 INJECTION, POWDER, FOR SOLUTION INTRAMUSCULAR; INTRAVENOUS EVERY 8 HOURS SCHEDULED
Status: DISCONTINUED | OUTPATIENT
Start: 2017-04-09 | End: 2017-04-09 | Stop reason: DRUGHIGH

## 2017-04-09 RX ORDER — NICOTINE POLACRILEX 4 MG
15 LOZENGE BUCCAL
Status: DISCONTINUED | OUTPATIENT
Start: 2017-04-09 | End: 2017-04-10 | Stop reason: SDUPTHER

## 2017-04-09 RX ORDER — PRASUGREL 10 MG/1
10 TABLET, FILM COATED ORAL DAILY
Status: DISCONTINUED | OUTPATIENT
Start: 2017-04-09 | End: 2017-04-12 | Stop reason: HOSPADM

## 2017-04-09 RX ORDER — RANOLAZINE 500 MG/1
500 TABLET, EXTENDED RELEASE ORAL DAILY
Status: DISCONTINUED | OUTPATIENT
Start: 2017-04-09 | End: 2017-04-12 | Stop reason: HOSPADM

## 2017-04-09 RX ADMIN — DOXAZOSIN MESYLATE 4 MG: 2 TABLET ORAL at 20:22

## 2017-04-09 RX ADMIN — AMPICILLIN AND SULBACTAM 1.5 G: 1; .5 INJECTION, POWDER, FOR SOLUTION INTRAMUSCULAR; INTRAVENOUS at 22:11

## 2017-04-09 RX ADMIN — INSULIN DETEMIR 20 UNITS: 100 INJECTION, SOLUTION SUBCUTANEOUS at 20:23

## 2017-04-09 RX ADMIN — MAGNESIUM OXIDE TAB 400 MG (241.3 MG ELEMENTAL MG) 400 MG: 400 (241.3 MG) TAB at 13:57

## 2017-04-09 RX ADMIN — METFORMIN HYDROCHLORIDE 500 MG: 500 TABLET ORAL at 17:58

## 2017-04-09 RX ADMIN — VANCOMYCIN HYDROCHLORIDE 1500 MG: 5 INJECTION, POWDER, LYOPHILIZED, FOR SOLUTION INTRAVENOUS at 20:31

## 2017-04-09 RX ADMIN — KETOROLAC TROMETHAMINE 15 MG: 30 INJECTION, SOLUTION INTRAMUSCULAR; INTRAVENOUS at 19:49

## 2017-04-09 RX ADMIN — INSULIN ASPART 2 UNITS: 100 INJECTION, SOLUTION INTRAVENOUS; SUBCUTANEOUS at 20:22

## 2017-04-09 RX ADMIN — SODIUM CHLORIDE 100 ML/HR: 900 INJECTION, SOLUTION INTRAVENOUS at 14:49

## 2017-04-09 RX ADMIN — VANCOMYCIN HYDROCHLORIDE 2000 MG: 1 INJECTION, POWDER, LYOPHILIZED, FOR SOLUTION INTRAVENOUS at 10:18

## 2017-04-09 RX ADMIN — AMPICILLIN AND SULBACTAM 1.5 G: 1; .5 INJECTION, POWDER, FOR SOLUTION INTRAMUSCULAR; INTRAVENOUS at 15:48

## 2017-04-09 RX ADMIN — CARBAMAZEPINE 100 MG: 100 TABLET, EXTENDED RELEASE ORAL at 20:22

## 2017-04-09 NOTE — ED PROVIDER NOTES
Subjective   HPI Comments: 66 years old left-handed male with history of coronary artery disease status post stenting, diabetes mellitus, hypertension presented in the ER with right middle finger possible insect bite/swelling for almost a week.  It is progressively getting worse.  Now he has red streaking going up to the right elbow.  He has taken Keflex outpatient with no improvement.  Complaining of pain especially with movements of the finger.  Has mild restriction and drainage of movements of right hand finger.  And generalized feeling of malaise and low-grade fever.  No chest pain or shortness of breath.  Blood sugar is fairly uncontrolled as per patient.    Patient is a 66 y.o. male presenting with upper extremity pain.   History provided by:  Patient  Upper Extremity Issue   Location:  Finger and hand  Hand location:  R hand  Finger location:  R middle finger  Pain details:     Quality:  Sharp    Radiates to:  R forearm    Severity:  Mild    Onset quality:  Gradual    Duration:  1 week    Timing:  Constant    Progression:  Worsening  Handedness:  Left-handed  Foreign body present:  No foreign bodies  Prior injury to area:  No  Relieved by:  Nothing  Worsened by:  Movement  Ineffective treatments: keflex.  Associated symptoms: swelling    Associated symptoms: no back pain, no fever and no neck pain        Review of Systems   Constitutional: Negative for activity change, chills and fever.   HENT: Negative for congestion, facial swelling, rhinorrhea and sinus pressure.    Eyes: Negative for photophobia and visual disturbance.   Respiratory: Negative for cough, chest tightness, shortness of breath and wheezing.    Cardiovascular: Negative for chest pain.   Gastrointestinal: Negative for abdominal distention, abdominal pain, diarrhea, nausea and vomiting.   Endocrine: Negative for polyuria.   Genitourinary: Negative for flank pain.   Musculoskeletal: Negative for back pain, joint swelling and neck pain.   Skin:  Negative for rash.   Neurological: Negative for seizures, numbness and headaches.   Hematological: Negative for adenopathy.   Psychiatric/Behavioral: Negative for agitation.       Past Medical History:   Diagnosis Date   • Cobalamin deficiency    • Coronary atherosclerosis     post stent      • Diverticular disease of colon    • Encounter for screening for malignant neoplasm of prostate 08/13/2014   • Essential hypertension    • GERD (gastroesophageal reflux disease)    • Goiter    • History of colon polyps    • History of echocardiogram 05/10/2016    Mild dilated LV with normal LV systolic function, with LVEF of 60%.Diastolic dysfunction of the left ventricle.Moderately left atrial dilatation.Midl mitral and pulmonic valve regurg.Trivial tricuspid valve regurg.No pul.htn.[facility]   • History of Holter monitoring 05/09/2016    Rare PACs and PVCs.One run of SVT with 6 beats at 147 BPM. [facility].   • History of trigeminal neuralgia    • Hypercholesterolemia    • Hyperkalemia    • Impotence    • Malaise and fatigue    • Obstructive sleep apnea syndrome    • Plantar fasciitis    • Type 2 diabetes mellitus    • Type II diabetes mellitus, uncontrolled    • Vitamin deficiency        No Known Allergies    Past Surgical History:   Procedure Laterality Date   • CARDIAC CATHETERIZATION  12/18/2014    Stent in LAD widely patent. Distal LAD mild 50% stenosis. Circumflex PDA about 50-70%. RCA nondominant. Circumflex dominant.   • INJECTION OF MEDICATION  07/05/2016    B12 (Cobalamin deficiency) (53)       Family History   Problem Relation Age of Onset   • Diabetes Other    • Heart disease Other    • Hypertension Other    • Stroke Other        Social History     Social History   • Marital status:      Spouse name: N/A   • Number of children: N/A   • Years of education: N/A     Social History Main Topics   • Smoking status: Former Smoker   • Smokeless tobacco: Former User     Types: Chew   • Alcohol  use No   • Drug use: No   • Sexual activity: Not Asked     Other Topics Concern   • None     Social History Narrative           Objective   Physical Exam   Constitutional: He is oriented to person, place, and time. He appears well-developed and well-nourished. No distress.   HENT:   Head: Normocephalic and atraumatic.   Eyes: EOM are normal. Pupils are equal, round, and reactive to light.   Neck: Normal range of motion. Neck supple.   Cardiovascular: Normal rate, regular rhythm and normal heart sounds.    Pulmonary/Chest: Effort normal and breath sounds normal. No respiratory distress. He has no wheezes.   Abdominal: Soft. Bowel sounds are normal. He exhibits no distension. There is no tenderness.   Musculoskeletal: Normal range of motion. He exhibits edema and tenderness.   Right middle finger proximal phalanx swelling/redness.  Pain with passive flexion of finger at metacarpophalangeal joint.  Distal neurovascular well intact.  Has red streaking extending to the right elbow.  Mild swelling of dorsum of hand.   Neurological: He is alert and oriented to person, place, and time.   Skin: Skin is warm and dry. No rash noted.   Psychiatric: He has a normal mood and affect.   Nursing note and vitals reviewed.      Procedures         ED Course  ED Course   Comment By Time   Swelling of her right middle finger along with the right hand with streaking up to right elbow.  He is started on IV vancomycin in the ER.  Workup showed elevated lactate 2.1.  I believe he would benefit with inpatient IV antibiotics.  Have discussed with Dr. Pierre and patient is being admitted. Anand Hensley MD 04/09 1117                Labs Reviewed   COMPREHENSIVE METABOLIC PANEL - Abnormal; Notable for the following:        Result Value    Glucose 255 (*)     All other components within normal limits   LACTIC ACID, PLASMA - Abnormal; Notable for the following:     Lactate 2.1 (*)     All other components within normal limits   CBC WITH AUTO  DIFFERENTIAL - Abnormal; Notable for the following:     RBC 4.18 (*)     Hemoglobin 13.4 (*)     Hematocrit 37.6 (*)     All other components within normal limits   C-REACTIVE PROTEIN - Abnormal; Notable for the following:     C-Reactive Protein 3.10 (*)     All other components within normal limits   HEMOGLOBIN A1C - Abnormal; Notable for the following:     Hemoglobin A1C 8.29 (*)     All other components within normal limits   CBC WITH AUTO DIFFERENTIAL - Abnormal; Notable for the following:     RBC 3.83 (*)     Hemoglobin 12.4 (*)     Hematocrit 34.7 (*)     Platelets 144 (*)     All other components within normal limits   COMPREHENSIVE METABOLIC PANEL - Abnormal; Notable for the following:     Glucose 268 (*)     Calcium 8.3 (*)     All other components within normal limits   CBC WITH AUTO DIFFERENTIAL - Abnormal; Notable for the following:     RBC 3.96 (*)     Hemoglobin 12.8 (*)     Hematocrit 35.7 (*)     Platelets 145 (*)     All other components within normal limits   VANCOMYCIN, TROUGH - Abnormal; Notable for the following:     Vancomycin Trough 9.16 (*)     All other components within normal limits   POCT GLUCOSE FINGERSTICK - Abnormal; Notable for the following:     Glucose 146 (*)     All other components within normal limits   POCT GLUCOSE FINGERSTICK - Abnormal; Notable for the following:     Glucose 156 (*)     All other components within normal limits   POCT GLUCOSE FINGERSTICK - Abnormal; Notable for the following:     Glucose 173 (*)     All other components within normal limits   POCT GLUCOSE FINGERSTICK - Abnormal; Notable for the following:     Glucose 273 (*)     All other components within normal limits   POCT GLUCOSE FINGERSTICK - Abnormal; Notable for the following:     Glucose 245 (*)     All other components within normal limits   POCT GLUCOSE FINGERSTICK - Abnormal; Notable for the following:     Glucose 258 (*)     All other components within normal limits   POCT GLUCOSE FINGERSTICK -  Abnormal; Notable for the following:     Glucose 260 (*)     All other components within normal limits   BLOOD CULTURE - Normal   BLOOD CULTURE - Normal   LACTATE ACID, REFLEX - Normal   POCT GLUCOSE FINGERSTICK - Normal    Glucose 146 (*)    POCT GLUCOSE FINGERSTICK   POCT GLUCOSE FINGERSTICK   POCT GLUCOSE FINGERSTICK   POCT GLUCOSE FINGERSTICK   POCT GLUCOSE FINGERSTICK   POCT GLUCOSE FINGERSTICK   POCT GLUCOSE FINGERSTICK   POCT GLUCOSE FINGERSTICK   POCT GLUCOSE FINGERSTICK   POCT GLUCOSE FINGERSTICK   POCT GLUCOSE FINGERSTICK   POCT GLUCOSE FINGERSTICK   POCT GLUCOSE FINGERSTICK   POCT GLUCOSE FINGERSTICK   POCT GLUCOSE FINGERSTICK   POCT GLUCOSE FINGERSTICK   POCT GLUCOSE FINGERSTICK   POCT GLUCOSE FINGERSTICK   POCT GLUCOSE FINGERSTICK   POCT GLUCOSE FINGERSTICK   POCT GLUCOSE FINGERSTICK   CBC AND DIFFERENTIAL    Narrative:     The following orders were created for panel order CBC & Differential.  Procedure                               Abnormality         Status                     ---------                               -----------         ------                     CBC Auto Differential[[id number]]         Abnormal            Final result                 Please view results for these tests on the individual orders.   EXTRA TUBES    Narrative:     The following orders were created for panel order Extra Tubes.  Procedure                               Abnormality         Status                     ---------                               -----------         ------                     Light Blue Top[[id number]]                                    Final result               Gold Top - [facility][[id number]]                                    Final result                 Please view results for these tests on the individual orders.   LIGHT BLUE TOP   GOLD TOP - [facility]   CBC AND DIFFERENTIAL    Narrative:     The following orders were created for panel order CBC & Differential.  Procedure                                Abnormality         Status                     ---------                               -----------         ------                     CBC Auto Differential[[id number]]         Abnormal            Final result                 Please view results for these tests on the individual orders.   CBC AND DIFFERENTIAL    Narrative:     The following orders were created for panel order CBC & Differential.  Procedure                               Abnormality         Status                     ---------                               -----------         ------                     CBC Auto Differential[[id number]]         Abnormal            Final result                 Please view results for these tests on the individual orders.       Xr Hand 3+ View Right    Result Date: 4/9/2017  Narrative: Patient Name:  NETTA CONN Patient ID:  [id number] Ordering:  EDUARDO VAIL Attending:  EDUARDO VAIL Referring:  EDUARDO VAIL ------------------------------------------------ Procedure:  Right hand    Indication:  Middle finger swelling, cellulitis   . Technique:  Three views   . Prior relevant exam:  None.   No evidence of acute bony, soft tissue, or joint abnormality is noted. Bone mineralization is within normal limits. The visualized joint spaces appear intact. Normal right middle finger.     Impression: CONCLUSION: 1.  Normal right hand. Normal right middle finger.   Electronically signed by:  Moustapha Lara MD  4/9/2017 10:19 AM CDT Workstation: [id number]          MDM    Final diagnoses:   Cellulitis of right hand            Eduardo Vail MD  04/11/17 2056       Eduardo Vail MD  04/11/17 2057

## 2017-04-09 NOTE — PROGRESS NOTES
"Pharmacokinetics by Pharmacy - Vancomycin    Beka Zavaleta is a 66 y.o. male   [Ht: 72\" (182.9 cm); Wt: 223 lb 9.6 oz (101 kg)]    Estimated Creatinine Clearance: 107.4 mL/min (by C-G formula based on Cr of 0.81).   Lab Results   Component Value Date    CREATININE 0.81 04/09/2017    CREATININE 0.92 03/29/2017    CREATININE 0.9 11/11/2016    CREATININE 0.9 10/25/2016    CREATININE 1.0 05/12/2016      Lab Results   Component Value Date    WBC 6.81 04/09/2017    WBC 5.99 03/29/2017    WBC 6.9 11/16/2016      Temp Readings from Last 1 Encounters:   04/09/17 97.6 °F (36.4 °C) (Oral)       Indication for use: cellulitis     Baseline culture results:  Microbiology Results (last 10 days)       ** No results found for the last 240 hours. **               Assessment/Plan  Initiated Vancomycin 1500 mg IVPB Q12H. Will order Vancomycin trough level prior to 5th dose.  Pharmacy will monitor renal function and adjust dose accordingly.    Minesh Obrien, [facility]  04/09/17 1:50 PM     "

## 2017-04-09 NOTE — ED NOTES
Pt presents to the ED with an insect bite to right middle finger. Went to clinic on Thursday and received a shot and PO antibiotic. Reports that the area does not look any better and may even look worse. Wife states the redness appears to be spreading up his arm. Area appears red, swollen, and warm to touch. Also appears to be slightly swollen.     Carrington Harris RN  04/09/17 [id number]

## 2017-04-09 NOTE — PLAN OF CARE
Problem: Patient Care Overview (Adult)  Goal: Plan of Care Review  Outcome: Ongoing (interventions implemented as appropriate)  Goal: Adult Individualization and Mutuality  Outcome: Ongoing (interventions implemented as appropriate)    Problem: Skin Integrity Impairment, Risk/Actual (Adult)  Goal: Identify Related Risk Factors and Signs and Symptoms  Outcome: Outcome(s) achieved Date Met:  04/09/17  Goal: Skin Integrity/Wound Healing  Outcome: Ongoing (interventions implemented as appropriate)    Problem: Pain, Acute (Adult)  Goal: Identify Related Risk Factors and Signs and Symptoms  Outcome: Outcome(s) achieved Date Met:  04/09/17  Goal: Acceptable Pain Control/Comfort Level  Outcome: Ongoing (interventions implemented as appropriate)

## 2017-04-09 NOTE — H&P
[facility] Medicine Admission      Date of Admission: 4/9/2017      Primary Care Physician: Fede Hawkins MD      Chief Complaint: right digit swelling     HPI:This is a 66 y old male who 1 week ago noticed the third digit of his right hand starting to swell ,he had some pain in that hand ,he went to see  A doctor in an urgent care  And was prescribed some keflex but there was no improvement so he came to our ED  He denies chest pain ,fever or chills   He got one dose of vancomycin in the ED     Past Medical History:  has a past medical history of Cobalamin deficiency; Coronary atherosclerosis; Diverticular disease of colon; Encounter for screening for malignant neoplasm of prostate (08/13/2014); Essential hypertension; GERD (gastroesophageal reflux disease); Goiter; History of colon polyps; History of echocardiogram (05/10/2016); History of Holter monitoring (05/09/2016); History of trigeminal neuralgia; Hypercholesterolemia; Hyperkalemia; Impotence; Malaise and fatigue; Obstructive sleep apnea syndrome; Plantar fasciitis; Type 2 diabetes mellitus; Type II diabetes mellitus, uncontrolled; and Vitamin deficiency.    Past Surgical History:  has a past surgical history that includes Injection of Medication (07/05/2016) and Cardiac catheterization (12/18/2014).    Family History: family history includes Diabetes in his other; Heart disease in his other; Hypertension in his other; Stroke in his other.    Social History:  reports that he has quit smoking. He has quit using smokeless tobacco. His smokeless tobacco use included Chew. He reports that he does not drink alcohol or use illicit drugs.    Allergies: No Known Allergies    Medications: Scheduled Meds:  amLODIPine 10 mg Oral Daily   ampicillin-sulbactam 1.5 g Intramuscular Q8H   aspirin 81 mg Oral Daily   atenolol 100 mg Oral Daily   atorvastatin 80 mg Oral Daily   carBAMazepine  mg Oral Q12H   doxazosin 4 mg  Oral Nightly   insulin aspart 0-9 Units Subcutaneous 4x Daily AC & at Bedtime   insulin detemir 20 Units Subcutaneous Nightly   isosorbide mononitrate 30 mg Oral Daily   lisinopril 20 mg Oral Q24H   magnesium oxide 400 mg Oral Daily   metFORMIN 500 mg Oral BID   multivitamin with minerals 1 tablet Oral Daily   [START ON 4/10/2017] pantoprazole 40 mg Oral Q AM   prasugrel 10 mg Oral Daily   ranolazine 500 mg Oral Daily   SITagliptin 100 mg Oral Daily   spironolactone 25 mg Oral Daily     Continuous Infusions:  Pharmacy to dose vancomycin    sodium chloride 100 mL/hr     PRN Meds:.dextrose  •  dextrose  •  glucagon (human recombinant)  •  ketorolac  •  Pharmacy to dose vancomycin    Review of Systems:  Review of Systems   Constitutional: Negative for activity change, appetite change, chills, diaphoresis, fatigue, fever and unexpected weight change.   HENT: Negative.  Negative for congestion, dental problem, drooling, ear discharge, ear pain, facial swelling, hearing loss, mouth sores, nosebleeds, postnasal drip, rhinorrhea, sinus pressure, sneezing, tinnitus, trouble swallowing and voice change.    Eyes: Negative for photophobia and discharge.   Respiratory: Negative for apnea, cough, choking, shortness of breath, wheezing and stridor.    Cardiovascular: Negative for chest pain, palpitations and leg swelling.   Gastrointestinal: Negative for abdominal pain, anal bleeding, blood in stool, constipation, diarrhea, nausea, rectal pain and vomiting.   Endocrine: Negative for cold intolerance, heat intolerance, polydipsia, polyphagia and polyuria.   Genitourinary: Negative for dysuria, enuresis, frequency, hematuria and urgency.   Musculoskeletal: Positive for arthralgias. Negative for back pain, joint swelling, myalgias, neck pain and neck stiffness.   Skin: Negative for color change, pallor, rash and wound.        Dorsal aspe ct of right hand is red   3rd digit of right hand is red swollen    Allergic/Immunologic:  Negative for food allergies and immunocompromised state.   Neurological: Negative for dizziness, tremors, seizures, syncope, facial asymmetry, speech difficulty, weakness, light-headedness, numbness and headaches.   Hematological: Negative for adenopathy.   Psychiatric/Behavioral: Negative for agitation, behavioral problems, confusion, hallucinations, sleep disturbance and suicidal ideas. The patient is not nervous/anxious.       Otherwise complete ROS is negative except as mentioned above.    Physical Exam:   Temp:  [97.6 °F (36.4 °C)-97.9 °F (36.6 °C)] 97.6 °F (36.4 °C)  Heart Rate:  [68-72] 68  Resp:  [16-18] 18  BP: (154-172)/(69-86) 154/72  Physical Exam   Constitutional: He is oriented to person, place, and time. He appears well-developed and well-nourished.  Non-toxic appearance. He does not have a sickly appearance. He does not appear ill. No distress. He is not intubated.   HENT:   Head: Normocephalic and atraumatic.   Right Ear: External ear normal.   Left Ear: External ear normal.   Nose: Nose normal.   Mouth/Throat: Oropharynx is clear and moist. No oropharyngeal exudate.   Eyes: Conjunctivae and EOM are normal. Pupils are equal, round, and reactive to light. Right eye exhibits no discharge and no exudate. Left eye exhibits no discharge and no exudate. Right conjunctiva is not injected. Right conjunctiva has no hemorrhage. Left conjunctiva is not injected. Left conjunctiva has no hemorrhage. No scleral icterus.   Neck: Normal range of motion. Neck supple. No hepatojugular reflux and no JVD present. No tracheal tenderness, no spinous process tenderness and no muscular tenderness present. Carotid bruit is not present. No rigidity. No tracheal deviation, no edema, no erythema and normal range of motion present. No thyroid mass and no thyromegaly present.   Cardiovascular: Normal rate, regular rhythm, normal heart sounds and intact distal pulses.   No extrasystoles are present. Exam reveals no gallop and no  friction rub.    No murmur heard.  Pulmonary/Chest: Effort normal and breath sounds normal. No stridor. He is not intubated. No respiratory distress. He has no decreased breath sounds. He has no wheezes. He has no rhonchi. He has no rales. He exhibits no tenderness.   Abdominal: Soft. Normal appearance and bowel sounds are normal. He exhibits no shifting dullness, no distension, no pulsatile liver, no fluid wave, no abdominal bruit, no ascites, no pulsatile midline mass and no mass. There is no hepatosplenomegaly, splenomegaly or hepatomegaly. There is no tenderness. There is no rigidity, no rebound, no guarding, no CVA tenderness, no tenderness at McBurney's point and negative Palacio's sign. No hernia.   Genitourinary: Rectal exam shows guaiac negative stool.   Musculoskeletal: Normal range of motion. He exhibits no edema, tenderness or deformity.        Right shoulder: He exhibits no swelling.      Skin Integrity  -   He hasno right foot ulcer, non-callous right foot, no right foot warmth and no right foot blister. He has no left foot ulcer, non-callous left foot, no left foot warmth and no left foot blister..  Lymphadenopathy:     He has no cervical adenopathy.     He has no axillary adenopathy.   Neurological: He is alert and oriented to person, place, and time. He has normal strength and normal reflexes. He is not disoriented. He displays no atrophy, no tremor and normal reflexes. No cranial nerve deficit or sensory deficit. He exhibits normal muscle tone. He displays no seizure activity. Coordination and gait normal. He displays no Babinski's sign on the right side. He displays no Babinski's sign on the left side.   Skin: Skin is warm and dry. No abrasion, no bruising, no burn, no ecchymosis, no laceration, no lesion, no purpura and no rash noted. Rash is not macular, not papular, not maculopapular, not nodular, not pustular, not vesicular and not urticarial. He is not diaphoretic. No cyanosis or erythema. No  pallor. Nails show no clubbing.   Dorsal aspect of right hand is red   3rd digit is red and swollen      Psychiatric: He has a normal mood and affect. His behavior is normal. Judgment and thought content normal. His mood appears not anxious. His affect is not angry, not blunt, not labile and not inappropriate. His speech is not slurred. He is not agitated, not aggressive, not hyperactive, not slowed, not withdrawn and not combative. Thought content is not paranoid and not delusional. Cognition and memory are not impaired. He does not express impulsivity or inappropriate judgment. He does not exhibit a depressed mood. He expresses no homicidal and no suicidal ideation. He expresses no suicidal plans and no homicidal plans. He is communicative. He exhibits normal recent memory and normal remote memory.         Results Reviewed:  I have personally reviewed current lab, radiology, and data and agree with results.  Lab Results (last 24 hours)     Procedure Component Value Units Date/Time    [facility] [[id number]] Collected:  04/09/17 0953    Specimen:  Blood Updated:  04/09/17 1008    Extra Tubes [[id number]] Collected:  04/09/17 0953    Specimen:  Blood from Blood, Venous Line Updated:  04/09/17 1009    Narrative:       The following orders were created for panel order Extra Tubes.  Procedure                               Abnormality         Status                     ---------                               -----------         ------                     Light Blue Top[[id number]]                                    In process                 Gold Top - [facility][[id number]]                                    In process                   Please view results for these tests on the individual orders.    Light Blue Top [[id number]] Collected:  04/09/17 0953    Specimen:  Blood Updated:  04/09/17 1009    Blood Culture [[id number]] Collected:  04/09/17 0953    Specimen:  Blood from Arm, Left Updated:  04/09/17 1015    Comprehensive  Metabolic Panel [[id number]]  (Abnormal) Collected:  04/09/17 0953    Specimen:  Blood Updated:  04/09/17 1044     Glucose 255 (H) mg/dL      BUN 16 mg/dL      Creatinine 0.81 mg/dL      Sodium 140 mmol/L      Potassium 4.4 mmol/L      Chloride 102 mmol/L      CO2 25.0 mmol/L      Calcium 9.4 mg/dL      Total Protein 7.0 g/dL      Albumin 4.30 g/dL      ALT (SGPT) 32 U/L      AST (SGOT) 21 U/L      Alkaline Phosphatase 72 U/L      Total Bilirubin 0.5 mg/dL      eGFR Non African Amer 95 mL/min/1.73      Globulin 2.7 gm/dL      A/G Ratio 1.6 g/dL      BUN/Creatinine Ratio 19.8     Anion Gap 13.0 mmol/L     Lactic Acid, Plasma [[id number]]  (Abnormal) Collected:  04/09/17 0953    Specimen:  Blood Updated:  04/09/17 1048     Lactate 2.1 (C) mmol/L     CBC & Differential [[id number]] Collected:  04/09/17 0953    Specimen:  Blood Updated:  04/09/17 1107    Narrative:       The following orders were created for panel order CBC & Differential.  Procedure                               Abnormality         Status                     ---------                               -----------         ------                     CBC Auto Differential[[id number]]         Abnormal            Final result                 Please view results for these tests on the individual orders.    CBC Auto Differential [[id number]]  (Abnormal) Collected:  04/09/17 0953    Specimen:  Blood Updated:  04/09/17 1107     WBC 6.81 10*3/mm3      RBC 4.18 (L) 10*6/mm3      Hemoglobin 13.4 (L) g/dL      Hematocrit 37.6 (L) %      MCV 90.0 fL      MCH 32.1 pg      MCHC 35.6 g/dL      RDW 12.3 %      RDW-SD 40.1 fl      MPV 9.5 fL      Platelets 160 10*3/mm3      Neutrophil % 63.9 %      Lymphocyte % 24.2 %      Monocyte % 11.0 %      Eosinophil % 0.7 %      Basophil % 0.1 %      Immature Grans % 0.1 %      Neutrophils, Absolute 4.34 10*3/mm3      Lymphocytes, Absolute 1.65 10*3/mm3      Monocytes, Absolute 0.75 10*3/mm3      Eosinophils, Absolute 0.05 10*3/mm3       Basophils, Absolute 0.01 10*3/mm3      Immature Grans, Absolute 0.01 10*3/mm3     Blood Culture [[id number]] Collected:  04/09/17 1236    Specimen:  Blood from Arm, Right Updated:  04/09/17 1247        Imaging Results (last 24 hours)     Procedure Component Value Units Date/Time    XR Hand 3+ View Right [[id number]] Collected:  04/09/17 1018     Updated:  04/09/17 1020    Narrative:       Patient Name:  NETTA CONN  Patient ID:  [id number]   Ordering:  EDUARDO VAIL  Attending:  EDUARDO VAIL  Referring:  EDUARDO VAIL  ------------------------------------------------    Procedure:  Right hand        Indication:  Middle finger swelling, cellulitis   .    Technique:  Three views   .    Prior relevant exam:  None.      No evidence of acute bony, soft tissue, or joint abnormality is  noted. Bone mineralization is within normal limits. The  visualized joint spaces appear intact. Normal right middle  finger.        Impression:       CONCLUSION:   1.  Normal right hand. Normal right middle finger.       Electronically signed by:  Moustapha Lara MD  4/9/2017 10:19 AM CDT  Workstation: [id number]            Assessment:    Hospital Problem List     Cellulitis of right hand      DM type 2  CAD           Plan:  Admit  To Med surg  Iv fluids  Blood cultures  Iv antibiotics  Insulin  Iv pain medicine   Will resume his home medication as the medical course  Dictates     I discussed the patients findings and my recommendations with: patient and his wife     Corrie Jimenez MD  04/09/17  1:49 PM

## 2017-04-10 LAB
BASOPHILS # BLD AUTO: 0.01 10*3/MM3 (ref 0–0.2)
BASOPHILS NFR BLD AUTO: 0.2 % (ref 0–2)
CRP SERPL-MCNC: 3.1 MG/DL (ref 0–1)
DEPRECATED RDW RBC AUTO: 40 FL (ref 35.1–43.9)
EOSINOPHIL # BLD AUTO: 0.06 10*3/MM3 (ref 0–0.7)
EOSINOPHIL NFR BLD AUTO: 1 % (ref 0–7)
ERYTHROCYTE [DISTWIDTH] IN BLOOD BY AUTOMATED COUNT: 12.1 % (ref 11.5–14.5)
GLUCOSE BLDC GLUCOMTR-MCNC: 146 MG/DL (ref 70–130)
GLUCOSE BLDC GLUCOMTR-MCNC: 156 MG/DL (ref 70–130)
GLUCOSE BLDC GLUCOMTR-MCNC: 173 MG/DL (ref 70–130)
GLUCOSE BLDC GLUCOMTR-MCNC: 273 MG/DL (ref 70–130)
HBA1C MFR BLD: 8.29 % (ref 4–5.6)
HCT VFR BLD AUTO: 34.7 % (ref 39–49)
HGB BLD-MCNC: 12.4 G/DL (ref 13.7–17.3)
IMM GRANULOCYTES # BLD: 0.02 10*3/MM3 (ref 0–0.02)
IMM GRANULOCYTES NFR BLD: 0.3 % (ref 0–0.5)
LYMPHOCYTES # BLD AUTO: 1.67 10*3/MM3 (ref 0.6–4.2)
LYMPHOCYTES NFR BLD AUTO: 26.9 % (ref 10–50)
MCH RBC QN AUTO: 32.4 PG (ref 26.5–34)
MCHC RBC AUTO-ENTMCNC: 35.7 G/DL (ref 31.5–36.3)
MCV RBC AUTO: 90.6 FL (ref 80–98)
MONOCYTES # BLD AUTO: 0.71 10*3/MM3 (ref 0–0.9)
MONOCYTES NFR BLD AUTO: 11.4 % (ref 0–12)
NEUTROPHILS # BLD AUTO: 3.74 10*3/MM3 (ref 2–8.6)
NEUTROPHILS NFR BLD AUTO: 60.2 % (ref 37–80)
PLATELET # BLD AUTO: 144 10*3/MM3 (ref 150–450)
PMV BLD AUTO: 10.6 FL (ref 8–12)
RBC # BLD AUTO: 3.83 10*6/MM3 (ref 4.37–5.74)
WBC NRBC COR # BLD: 6.21 10*3/MM3 (ref 3.2–9.8)

## 2017-04-10 PROCEDURE — 63710000001 INSULIN ASPART PER 5 UNITS: Performed by: INTERNAL MEDICINE

## 2017-04-10 PROCEDURE — 25010000002 KETOROLAC TROMETHAMINE PER 15 MG: Performed by: INTERNAL MEDICINE

## 2017-04-10 PROCEDURE — 82962 GLUCOSE BLOOD TEST: CPT

## 2017-04-10 PROCEDURE — 86140 C-REACTIVE PROTEIN: CPT | Performed by: INTERNAL MEDICINE

## 2017-04-10 PROCEDURE — 83036 HEMOGLOBIN GLYCOSYLATED A1C: CPT | Performed by: INTERNAL MEDICINE

## 2017-04-10 PROCEDURE — 63710000001 INSULIN DETEMIR PER 5 UNITS: Performed by: INTERNAL MEDICINE

## 2017-04-10 PROCEDURE — 85025 COMPLETE CBC W/AUTO DIFF WBC: CPT | Performed by: INTERNAL MEDICINE

## 2017-04-10 PROCEDURE — 25010000002 VANCOMYCIN PER 500 MG: Performed by: INTERNAL MEDICINE

## 2017-04-10 RX ORDER — CARBAMAZEPINE 200 MG/1
300 TABLET ORAL EVERY EVENING
Status: DISCONTINUED | OUTPATIENT
Start: 2017-04-10 | End: 2017-04-12 | Stop reason: HOSPADM

## 2017-04-10 RX ORDER — CARBAMAZEPINE 200 MG/1
400 TABLET ORAL
Status: DISCONTINUED | OUTPATIENT
Start: 2017-04-11 | End: 2017-04-12 | Stop reason: HOSPADM

## 2017-04-10 RX ORDER — NICOTINE POLACRILEX 4 MG
15 LOZENGE BUCCAL
Status: DISCONTINUED | OUTPATIENT
Start: 2017-04-10 | End: 2017-04-12 | Stop reason: HOSPADM

## 2017-04-10 RX ORDER — CARBAMAZEPINE 200 MG/1
300 TABLET ORAL NIGHTLY
Status: DISCONTINUED | OUTPATIENT
Start: 2017-04-10 | End: 2017-04-12 | Stop reason: HOSPADM

## 2017-04-10 RX ORDER — DEXTROSE MONOHYDRATE 25 G/50ML
25 INJECTION, SOLUTION INTRAVENOUS
Status: DISCONTINUED | OUTPATIENT
Start: 2017-04-10 | End: 2017-04-12 | Stop reason: HOSPADM

## 2017-04-10 RX ADMIN — ASPIRIN 81 MG: 81 TABLET, CHEWABLE ORAL at 08:45

## 2017-04-10 RX ADMIN — AMPICILLIN AND SULBACTAM 1.5 G: 1; .5 INJECTION, POWDER, FOR SOLUTION INTRAMUSCULAR; INTRAVENOUS at 13:49

## 2017-04-10 RX ADMIN — PANTOPRAZOLE SODIUM 40 MG: 40 TABLET, DELAYED RELEASE ORAL at 05:41

## 2017-04-10 RX ADMIN — SPIRONOLACTONE 25 MG: 25 TABLET ORAL at 08:33

## 2017-04-10 RX ADMIN — SODIUM CHLORIDE 100 ML/HR: 900 INJECTION, SOLUTION INTRAVENOUS at 13:18

## 2017-04-10 RX ADMIN — VANCOMYCIN HYDROCHLORIDE 1500 MG: 5 INJECTION, POWDER, LYOPHILIZED, FOR SOLUTION INTRAVENOUS at 09:58

## 2017-04-10 RX ADMIN — Medication 1 TABLET: at 08:33

## 2017-04-10 RX ADMIN — CARBAMAZEPINE 100 MG: 100 TABLET, EXTENDED RELEASE ORAL at 10:08

## 2017-04-10 RX ADMIN — METFORMIN HYDROCHLORIDE 500 MG: 500 TABLET ORAL at 17:21

## 2017-04-10 RX ADMIN — AMPICILLIN AND SULBACTAM 1.5 G: 1; .5 INJECTION, POWDER, FOR SOLUTION INTRAMUSCULAR; INTRAVENOUS at 23:00

## 2017-04-10 RX ADMIN — MAGNESIUM OXIDE TAB 400 MG (241.3 MG ELEMENTAL MG) 400 MG: 400 (241.3 MG) TAB at 08:33

## 2017-04-10 RX ADMIN — SODIUM CHLORIDE 100 ML/HR: 900 INJECTION, SOLUTION INTRAVENOUS at 02:53

## 2017-04-10 RX ADMIN — KETOROLAC TROMETHAMINE 15 MG: 30 INJECTION, SOLUTION INTRAMUSCULAR; INTRAVENOUS at 22:56

## 2017-04-10 RX ADMIN — INSULIN DETEMIR 20 UNITS: 100 INJECTION, SOLUTION SUBCUTANEOUS at 20:37

## 2017-04-10 RX ADMIN — RANOLAZINE 500 MG: 500 TABLET, FILM COATED, EXTENDED RELEASE ORAL at 08:34

## 2017-04-10 RX ADMIN — METFORMIN HYDROCHLORIDE 500 MG: 500 TABLET ORAL at 08:33

## 2017-04-10 RX ADMIN — CARBAMAZEPINE 300 MG: 200 TABLET ORAL at 16:47

## 2017-04-10 RX ADMIN — INSULIN ASPART 4 UNITS: 100 INJECTION, SOLUTION INTRAVENOUS; SUBCUTANEOUS at 20:51

## 2017-04-10 RX ADMIN — AMLODIPINE BESYLATE 10 MG: 10 TABLET ORAL at 08:33

## 2017-04-10 RX ADMIN — ATORVASTATIN CALCIUM 80 MG: 40 TABLET, FILM COATED ORAL at 08:33

## 2017-04-10 RX ADMIN — PRASUGREL HYDROCHLORIDE 10 MG: 10 TABLET, FILM COATED ORAL at 08:33

## 2017-04-10 RX ADMIN — VANCOMYCIN HYDROCHLORIDE 1500 MG: 5 INJECTION, POWDER, LYOPHILIZED, FOR SOLUTION INTRAVENOUS at 20:37

## 2017-04-10 RX ADMIN — DOXAZOSIN MESYLATE 4 MG: 2 TABLET ORAL at 20:37

## 2017-04-10 RX ADMIN — SITAGLIPTIN 100 MG: 100 TABLET, FILM COATED ORAL at 08:34

## 2017-04-10 RX ADMIN — INSULIN ASPART 4 UNITS: 100 INJECTION, SOLUTION INTRAVENOUS; SUBCUTANEOUS at 16:41

## 2017-04-10 RX ADMIN — INSULIN ASPART 6 UNITS: 100 INJECTION, SOLUTION INTRAVENOUS; SUBCUTANEOUS at 10:55

## 2017-04-10 RX ADMIN — CARBAMAZEPINE 300 MG: 200 TABLET ORAL at 22:59

## 2017-04-10 RX ADMIN — ATENOLOL 100 MG: 50 TABLET ORAL at 08:33

## 2017-04-10 RX ADMIN — INSULIN ASPART 2 UNITS: 100 INJECTION, SOLUTION INTRAVENOUS; SUBCUTANEOUS at 08:32

## 2017-04-10 RX ADMIN — AMPICILLIN AND SULBACTAM 1.5 G: 1; .5 INJECTION, POWDER, FOR SOLUTION INTRAMUSCULAR; INTRAVENOUS at 05:42

## 2017-04-10 RX ADMIN — ISOSORBIDE MONONITRATE 30 MG: 30 TABLET, EXTENDED RELEASE ORAL at 08:34

## 2017-04-10 RX ADMIN — LISINOPRIL 20 MG: 20 TABLET ORAL at 08:34

## 2017-04-10 NOTE — PROGRESS NOTES
[facility] Medicine Services  INPATIENT PROGRESS NOTE    Length of Stay: 1  Date of Admission: 4/9/2017  Primary Care Physician: Fede Hawkins MD    Subjective   Subjective:  Feels better, no chest pain no abdominal pain no nausea no vomiting  Review of Systems     All pertinent negatives and positives are as above. All other systems have been reviewed and are negative unless otherwise stated.     Objective    Temp:  [97.5 °F (36.4 °C)-97.6 °F (36.4 °C)] 97.5 °F (36.4 °C)  Heart Rate:  [68-95] 95  Resp:  [18] 18  BP: (137-159)/(70-82) 137/70  Physical Exam    Patient appears well, alert and oriented x 3, pleasant, cooperative.   Neck supple  No JVD No thyromegaly.  INGRIS. Ears, throat are normal.  Lungs are clear to auscultation. No crackles no wheezing   CV S1S2 normal, no murmurs, clicks, gallops or rubs.  Abdomen is soft, no tenderness, masses or organomegaly.    Extremities: no clubbing cyanosis or edema   Neurological CN 2-12 intact   Skin R hand cellulitis     No current facility-administered medications on file prior to encounter.      Current Outpatient Prescriptions on File Prior to Encounter   Medication Sig Dispense Refill   • amLODIPine (NORVASC) 10 MG tablet Take 1 tablet by mouth Daily. 90 tablet 3   • aspirin 81 MG tablet Take 81 mg by mouth daily.     • atenolol (TENORMIN) 100 MG tablet Take 1 tablet by mouth Daily. REPLACES THE COMBO MED DUE TO DOSING 90 tablet 3   • atorvastatin (LIPITOR) 80 MG tablet Take 1 tablet by mouth Daily. 90 tablet 3   • carBAMazepine XR (TEGretol  XR) 100 MG 12 hr tablet TAKE 4 TABLETS BY MOUTH EVERY DAY IN THE MORNING, 3 TABLETS EVERY DAY AT SUPPER, AND 3 TABLETS AT NIGHT 300 tablet 5   • cephalexin (KEFLEX) 500 MG capsule Take 1 capsule by mouth 3 (Three) Times a Day for 10 days. For infection.  Start 4/7/17 30 capsule 0   • colestipol (COLESTID) 1 G tablet Take 2 g by mouth 2 (two) times a day.     • doxazosin (CARDURA) 4  "MG tablet Take 1 tablet by mouth Every Night. 90 tablet 3   • glucose blood (ACCU-CHEK ENDER) test strip Test sugars up to 4 times daily as directed by Physician.     • insulin glargine (LANTUS) 100 UNIT/ML injection Inject 176 Units under the skin Daily. (Dosage Increased due to being on Prednisone) 12 mL 3   • Insulin Syringe-Needle U-100 30G X 7/16\" 1 ML misc daily.     • isosorbide mononitrate (IMDUR) 30 MG 24 hr tablet Take 1 tablet by mouth Daily. Take 1/2 pill once a day 30 tablet 5   • lisinopril (PRINIVIL,ZESTRIL) 40 MG tablet Take 1 tablet by mouth 2 (Two) Times a Day. 60 tablet 5   • magnesium oxide (MAGOX) 400 (241.3 MG) MG tablet tablet Take 400 mg by mouth Daily.     • metFORMIN (GLUCOPHAGE) 500 MG tablet Take 1 tablet by mouth 2 (Two) Times a Day. 60 tablet 5   • Multiple Vitamins-Minerals (ICAPS PO) Take  by mouth daily.     • OMEGA-3 FATTY ACIDS-VITAMIN E PO Take  by mouth.     • omeprazole (priLOSEC) 40 MG capsule Take 1 capsule by mouth Daily. 30 capsule 5   • prasugrel (EFFIENT) 10 MG tablet Take 10 mg by mouth.     • ranolazine (RANEXA) 1000 MG 12 hr tablet Take 500 mg by mouth Daily.     • RELION ULTRA THIN PLUS LANCETS misc test once daily     • SITagliptin (JANUVIA) 100 MG tablet Take 1 tablet by mouth Daily. 30 tablet 5   • spironolactone (ALDACTONE) 25 MG tablet Take 1 tablet by mouth Daily. 90 tablet 3   • benzonatate (TESSALON) 100 MG capsule Take 1 capsule by mouth 3 (Three) Times a Day As Needed for cough for up to 30 doses. 30 capsule 0   • nitroglycerin (NITROSTAT) 0.4 MG SL tablet Place 0.4 mg under the tongue as needed for chest pain. repeat X 1 in 5 min. if not relieved and then got to ER or call an ambulance           Results Review:  I have reviewed the labs, radiology results, and diagnostic studies.    Laboratory Data:  [unfilled]  Lab Results (last 24 hours)     Procedure Component Value Units Date/Time    Extra Tubes [[id number]] Collected:  04/09/17 0953    Specimen:  Blood from " Blood, Venous Line Updated:  04/09/17 1401    Narrative:       The following orders were created for panel order Extra Tubes.  Procedure                               Abnormality         Status                     ---------                               -----------         ------                     Light Blue Top[[id number]]                                    Final result               Gold Top - [facility][[id number]]                                    Final result                 Please view results for these tests on the individual orders.    Light Blue Top [[id number]] Collected:  04/09/17 0953    Specimen:  Blood Updated:  04/09/17 1401     Extra Tube hold for add-on      Auto resulted       Gold Top - [facility] [[id number]] Collected:  04/09/17 0953    Specimen:  Blood Updated:  04/09/17 1401     Extra Tube Hold for add-ons.      Auto resulted.       Lactate Acid, Reflex [[id number]]  (Normal) Collected:  04/09/17 1350    Specimen:  Blood Updated:  04/09/17 1428     Lactate 1.4 mmol/L     POC Glucose Fingerstick [[id number]]  (Normal) Collected:  04/09/17 1753    Specimen:  Blood Updated:  04/09/17 1754     Glucose 146 (A) mg/dL     Blood Culture [[id number]]  (Normal) Collected:  04/09/17 1236    Specimen:  Blood from Arm, Right Updated:  04/10/17 0106     Blood Culture No growth at less than 24 hours    POC Glucose Fingerstick [[id number]]  (Abnormal) Collected:  04/09/17 1749    Specimen:  Blood Updated:  04/10/17 0202     Glucose 146 (H) mg/dL       Meter: [id number]: [id number] [facility]       CBC & Differential [[id number]] Collected:  04/10/17 0602    Specimen:  Blood Updated:  04/10/17 0704    Narrative:       The following orders were created for panel order CBC & Differential.  Procedure                               Abnormality         Status                     ---------                               -----------         ------                     CBC Auto Differential[[id number]]         Abnormal             Final result                 Please view results for these tests on the individual orders.    CBC Auto Differential [[id number]]  (Abnormal) Collected:  04/10/17 0602    Specimen:  Blood Updated:  04/10/17 0704     WBC 6.21 10*3/mm3      RBC 3.83 (L) 10*6/mm3      Hemoglobin 12.4 (L) g/dL      Hematocrit 34.7 (L) %      MCV 90.6 fL      MCH 32.4 pg      MCHC 35.7 g/dL      RDW 12.1 %      RDW-SD 40.0 fl      MPV 10.6 fL      Platelets 144 (L) 10*3/mm3      Neutrophil % 60.2 %      Lymphocyte % 26.9 %      Monocyte % 11.4 %      Eosinophil % 1.0 %      Basophil % 0.2 %      Immature Grans % 0.3 %      Neutrophils, Absolute 3.74 10*3/mm3      Lymphocytes, Absolute 1.67 10*3/mm3      Monocytes, Absolute 0.71 10*3/mm3      Eosinophils, Absolute 0.06 10*3/mm3      Basophils, Absolute 0.01 10*3/mm3      Immature Grans, Absolute 0.02 10*3/mm3     Hemoglobin A1c [[id number]]  (Abnormal) Collected:  04/10/17 0602    Specimen:  Blood Updated:  04/10/17 0741     Hemoglobin A1C 8.29 (H) %     C-reactive Protein [[id number]]  (Abnormal) Collected:  04/10/17 0602    Specimen:  Blood Updated:  04/10/17 0744     C-Reactive Protein 3.10 (H) mg/dL     POC Glucose Fingerstick [[id number]]  (Abnormal) Collected:  04/10/17 0639    Specimen:  Blood Updated:  04/10/17 1033     Glucose 156 (H) mg/dL       RN NotifiedMeter: [id number]: [id number] GANGA HERNANDO       POC Glucose Fingerstick [[id number]]  (Abnormal) Collected:  04/09/17 2021    Specimen:  Blood Updated:  04/10/17 1047     Glucose 173 (H) mg/dL       Meter: [id number]: [id number] GANGA HERNANDO       Blood Culture [[id number]]  (Normal) Collected:  04/09/17 0953    Specimen:  Blood from Arm, Left Updated:  04/10/17 1101     Blood Culture No growth at 24 hours    POC Glucose Fingerstick [[id number]]  (Abnormal) Collected:  04/10/17 1037    Specimen:  Blood Updated:  04/10/17 1116     Glucose 273 (H) mg/dL       RN NotifiedMeter: [id number]: [id number] MARNIE  LINDSEY             Culture Data:   Blood Culture   Date Value Ref Range Status   04/09/2017 No growth at less than 24 hours  Preliminary   04/09/2017 No growth at 24 hours  Preliminary     No results found for: URINECX  No results found for: RESPCX  No results found for: WOUNDCX  No results found for: STOOLCX  No components found for: BODYFLD    Radiology Data:   Imaging Results (last 24 hours)     ** No results found for the last 24 hours. **          I have reviewed the patient current medications.     Assessment/Plan       Assessment and plan:    1. R hand Cellulitis: continue IV abxs vancomycin and Unasyn, continue supportive care, if it gets worse we will need orthopedic consultation.  2.  Diabetes type II: Acceptably controlled, place on insulin sliding scale.  3.  Hypertension essential controlled continue outpatient medications.  4.  Hyperlipidemia  5.  Coronary artery disease    Disposition home in 2-3 days    Debbie Turcios MD   04/10/17   12:32 PM

## 2017-04-10 NOTE — PLAN OF CARE
Problem: Patient Care Overview (Adult)  Goal: Plan of Care Review  Outcome: Ongoing (interventions implemented as appropriate)    04/10/17 1424   Coping/Psychosocial Response Interventions   Plan Of Care Reviewed With patient   Patient Care Overview   Progress improving   Outcome Evaluation   Outcome Summary/Follow up Plan VSS, less redness in right hand, and pt not complaining of pain.       Goal: Adult Individualization and Mutuality  Outcome: Ongoing (interventions implemented as appropriate)  Goal: Discharge Needs Assessment  Outcome: Ongoing (interventions implemented as appropriate)    Problem: Skin Integrity Impairment, Risk/Actual (Adult)  Goal: Skin Integrity/Wound Healing  Outcome: Ongoing (interventions implemented as appropriate)    Problem: Pain, Acute (Adult)  Goal: Acceptable Pain Control/Comfort Level  Outcome: Ongoing (interventions implemented as appropriate)

## 2017-04-10 NOTE — PLAN OF CARE
Problem: Patient Care Overview (Adult)  Goal: Plan of Care Review  Outcome: Ongoing (interventions implemented as appropriate)    04/10/17 0411   Coping/Psychosocial Response Interventions   Plan Of Care Reviewed With patient   Patient Care Overview   Progress no change   Outcome Evaluation   Outcome Summary/Follow up Plan pt has no complaints of pain, no changes       Goal: Adult Individualization and Mutuality  Outcome: Ongoing (interventions implemented as appropriate)  Goal: Discharge Needs Assessment  Outcome: Ongoing (interventions implemented as appropriate)    Problem: Skin Integrity Impairment, Risk/Actual (Adult)  Goal: Skin Integrity/Wound Healing  Outcome: Ongoing (interventions implemented as appropriate)    Problem: Pain, Acute (Adult)  Goal: Acceptable Pain Control/Comfort Level  Outcome: Ongoing (interventions implemented as appropriate)

## 2017-04-11 LAB
ALBUMIN SERPL-MCNC: 4 G/DL (ref 3.4–4.8)
ALBUMIN/GLOB SERPL: 1.5 G/DL (ref 1.1–1.8)
ALP SERPL-CCNC: 70 U/L (ref 38–126)
ALT SERPL W P-5'-P-CCNC: 31 U/L (ref 21–72)
ANION GAP SERPL CALCULATED.3IONS-SCNC: 13 MMOL/L (ref 5–15)
AST SERPL-CCNC: 25 U/L (ref 17–59)
BASOPHILS # BLD AUTO: 0.02 10*3/MM3 (ref 0–0.2)
BASOPHILS NFR BLD AUTO: 0.4 % (ref 0–2)
BILIRUB SERPL-MCNC: 0.4 MG/DL (ref 0.2–1.3)
BUN BLD-MCNC: 16 MG/DL (ref 7–21)
BUN/CREAT SERPL: 21.6 (ref 7–25)
CALCIUM SPEC-SCNC: 8.3 MG/DL (ref 8.4–10.2)
CHLORIDE SERPL-SCNC: 103 MMOL/L (ref 95–110)
CO2 SERPL-SCNC: 25 MMOL/L (ref 22–31)
CREAT BLD-MCNC: 0.74 MG/DL (ref 0.7–1.3)
DEPRECATED RDW RBC AUTO: 39.3 FL (ref 35.1–43.9)
EOSINOPHIL # BLD AUTO: 0.06 10*3/MM3 (ref 0–0.7)
EOSINOPHIL NFR BLD AUTO: 1.1 % (ref 0–7)
ERYTHROCYTE [DISTWIDTH] IN BLOOD BY AUTOMATED COUNT: 12 % (ref 11.5–14.5)
GFR SERPL CREATININE-BSD FRML MDRD: 105 ML/MIN/1.73 (ref 60–113)
GLOBULIN UR ELPH-MCNC: 2.6 GM/DL (ref 2.3–3.5)
GLUCOSE BLD-MCNC: 268 MG/DL (ref 60–100)
GLUCOSE BLDC GLUCOMTR-MCNC: 245 MG/DL (ref 70–130)
GLUCOSE BLDC GLUCOMTR-MCNC: 258 MG/DL (ref 70–130)
GLUCOSE BLDC GLUCOMTR-MCNC: 260 MG/DL (ref 70–130)
HCT VFR BLD AUTO: 35.7 % (ref 39–49)
HGB BLD-MCNC: 12.8 G/DL (ref 13.7–17.3)
IMM GRANULOCYTES # BLD: 0.01 10*3/MM3 (ref 0–0.02)
IMM GRANULOCYTES NFR BLD: 0.2 % (ref 0–0.5)
LYMPHOCYTES # BLD AUTO: 1.66 10*3/MM3 (ref 0.6–4.2)
LYMPHOCYTES NFR BLD AUTO: 30.5 % (ref 10–50)
MCH RBC QN AUTO: 32.3 PG (ref 26.5–34)
MCHC RBC AUTO-ENTMCNC: 35.9 G/DL (ref 31.5–36.3)
MCV RBC AUTO: 90.2 FL (ref 80–98)
MONOCYTES # BLD AUTO: 0.48 10*3/MM3 (ref 0–0.9)
MONOCYTES NFR BLD AUTO: 8.8 % (ref 0–12)
NEUTROPHILS # BLD AUTO: 3.21 10*3/MM3 (ref 2–8.6)
NEUTROPHILS NFR BLD AUTO: 59 % (ref 37–80)
PLATELET # BLD AUTO: 145 10*3/MM3 (ref 150–450)
PMV BLD AUTO: 10.2 FL (ref 8–12)
POTASSIUM BLD-SCNC: 4.6 MMOL/L (ref 3.5–5.1)
PROT SERPL-MCNC: 6.6 G/DL (ref 6.3–8.6)
RBC # BLD AUTO: 3.96 10*6/MM3 (ref 4.37–5.74)
SODIUM BLD-SCNC: 141 MMOL/L (ref 137–145)
VANCOMYCIN TROUGH SERPL-MCNC: 9.16 MCG/ML (ref 10–15)
WBC NRBC COR # BLD: 5.44 10*3/MM3 (ref 3.2–9.8)

## 2017-04-11 PROCEDURE — 25010000002 KETOROLAC TROMETHAMINE PER 15 MG: Performed by: INTERNAL MEDICINE

## 2017-04-11 PROCEDURE — 85025 COMPLETE CBC W/AUTO DIFF WBC: CPT | Performed by: INTERNAL MEDICINE

## 2017-04-11 PROCEDURE — 25010000002 VANCOMYCIN PER 500 MG: Performed by: INTERNAL MEDICINE

## 2017-04-11 PROCEDURE — 80053 COMPREHEN METABOLIC PANEL: CPT | Performed by: INTERNAL MEDICINE

## 2017-04-11 PROCEDURE — 63710000001 INSULIN ASPART PER 5 UNITS: Performed by: INTERNAL MEDICINE

## 2017-04-11 PROCEDURE — 63710000001 INSULIN DETEMIR PER 5 UNITS: Performed by: INTERNAL MEDICINE

## 2017-04-11 PROCEDURE — 80202 ASSAY OF VANCOMYCIN: CPT | Performed by: INTERNAL MEDICINE

## 2017-04-11 PROCEDURE — 82962 GLUCOSE BLOOD TEST: CPT

## 2017-04-11 RX ADMIN — PRASUGREL HYDROCHLORIDE 10 MG: 10 TABLET, FILM COATED ORAL at 08:11

## 2017-04-11 RX ADMIN — AMPICILLIN AND SULBACTAM 1.5 G: 1; .5 INJECTION, POWDER, FOR SOLUTION INTRAMUSCULAR; INTRAVENOUS at 22:39

## 2017-04-11 RX ADMIN — AMLODIPINE BESYLATE 10 MG: 10 TABLET ORAL at 08:10

## 2017-04-11 RX ADMIN — MAGNESIUM OXIDE TAB 400 MG (241.3 MG ELEMENTAL MG) 400 MG: 400 (241.3 MG) TAB at 08:11

## 2017-04-11 RX ADMIN — ASPIRIN 81 MG: 81 TABLET, CHEWABLE ORAL at 08:10

## 2017-04-11 RX ADMIN — SODIUM CHLORIDE 100 ML/HR: 900 INJECTION, SOLUTION INTRAVENOUS at 13:37

## 2017-04-11 RX ADMIN — RANOLAZINE 500 MG: 500 TABLET, FILM COATED, EXTENDED RELEASE ORAL at 08:10

## 2017-04-11 RX ADMIN — INSULIN DETEMIR 20 UNITS: 100 INJECTION, SOLUTION SUBCUTANEOUS at 20:56

## 2017-04-11 RX ADMIN — DOXAZOSIN MESYLATE 4 MG: 2 TABLET ORAL at 20:57

## 2017-04-11 RX ADMIN — SODIUM CHLORIDE 100 ML/HR: 900 INJECTION, SOLUTION INTRAVENOUS at 02:37

## 2017-04-11 RX ADMIN — AMPICILLIN AND SULBACTAM 1.5 G: 1; .5 INJECTION, POWDER, FOR SOLUTION INTRAMUSCULAR; INTRAVENOUS at 05:52

## 2017-04-11 RX ADMIN — AMPICILLIN AND SULBACTAM 1.5 G: 1; .5 INJECTION, POWDER, FOR SOLUTION INTRAMUSCULAR; INTRAVENOUS at 13:37

## 2017-04-11 RX ADMIN — INSULIN ASPART 6 UNITS: 100 INJECTION, SOLUTION INTRAVENOUS; SUBCUTANEOUS at 11:07

## 2017-04-11 RX ADMIN — VANCOMYCIN HYDROCHLORIDE 1500 MG: 5 INJECTION, POWDER, LYOPHILIZED, FOR SOLUTION INTRAVENOUS at 11:07

## 2017-04-11 RX ADMIN — LISINOPRIL 20 MG: 20 TABLET ORAL at 08:11

## 2017-04-11 RX ADMIN — PANTOPRAZOLE SODIUM 40 MG: 40 TABLET, DELAYED RELEASE ORAL at 05:51

## 2017-04-11 RX ADMIN — INSULIN ASPART 4 UNITS: 100 INJECTION, SOLUTION INTRAVENOUS; SUBCUTANEOUS at 07:42

## 2017-04-11 RX ADMIN — ATENOLOL 100 MG: 50 TABLET ORAL at 08:11

## 2017-04-11 RX ADMIN — METFORMIN HYDROCHLORIDE 500 MG: 500 TABLET ORAL at 17:05

## 2017-04-11 RX ADMIN — INSULIN ASPART 4 UNITS: 100 INJECTION, SOLUTION INTRAVENOUS; SUBCUTANEOUS at 17:05

## 2017-04-11 RX ADMIN — CARBAMAZEPINE 300 MG: 200 TABLET ORAL at 22:37

## 2017-04-11 RX ADMIN — ISOSORBIDE MONONITRATE 30 MG: 30 TABLET, EXTENDED RELEASE ORAL at 08:11

## 2017-04-11 RX ADMIN — CARBAMAZEPINE 300 MG: 200 TABLET ORAL at 17:05

## 2017-04-11 RX ADMIN — METFORMIN HYDROCHLORIDE 500 MG: 500 TABLET ORAL at 08:11

## 2017-04-11 RX ADMIN — INSULIN ASPART 5 UNITS: 100 INJECTION, SOLUTION INTRAVENOUS; SUBCUTANEOUS at 20:57

## 2017-04-11 RX ADMIN — CARBAMAZEPINE 400 MG: 200 TABLET ORAL at 05:52

## 2017-04-11 RX ADMIN — KETOROLAC TROMETHAMINE 15 MG: 30 INJECTION, SOLUTION INTRAMUSCULAR; INTRAVENOUS at 17:09

## 2017-04-11 RX ADMIN — VANCOMYCIN HYDROCHLORIDE 1500 MG: 5 INJECTION, POWDER, LYOPHILIZED, FOR SOLUTION INTRAVENOUS at 20:56

## 2017-04-11 RX ADMIN — SPIRONOLACTONE 25 MG: 25 TABLET ORAL at 08:10

## 2017-04-11 RX ADMIN — ATORVASTATIN CALCIUM 80 MG: 40 TABLET, FILM COATED ORAL at 08:10

## 2017-04-11 RX ADMIN — Medication 1 TABLET: at 08:10

## 2017-04-11 NOTE — PROGRESS NOTES
"Pharmacokinetic Follow-up Note - Vancomycin    Beka Zavaleta is a 66 y.o. male  [Ht: 72\" (182.9 cm); Wt: 223 lb 9.6 oz (101 kg)]    Estimated Creatinine Clearance: 108.8 mL/min (by C-G formula based on Cr of 0.74).   Lab Results   Component Value Date    CREATININE 0.74 04/11/2017    CREATININE 0.81 04/09/2017    CREATININE 0.92 03/29/2017      Lab Results   Component Value Date    WBC 5.44 04/11/2017    WBC 6.21 04/10/2017    WBC 6.81 04/09/2017      Lab Results   Component Value Date    VANCOTROUGH 9.16 (L) 04/11/2017         Current Vancomycin Dose:  1500 mg IVPB every 12 hours, day 3 of therapy.    Indication for use: cellulitis    Assessment/Plan:  Trough was drawn 2 hours late.  Expect true trough to be greater than 10. Will continue vancomycin 1500 mg iv q 12hr.  Pharmacy will continue to  monitor renal function and adjust dose accordingly.    Yolanda Ramírez RPH   04/11/17 12:45 PM    "

## 2017-04-11 NOTE — PLAN OF CARE
Problem: Patient Care Overview (Adult)  Goal: Plan of Care Review  Outcome: Ongoing (interventions implemented as appropriate)    04/10/17 1424 04/11/17 0423   Coping/Psychosocial Response Interventions   Plan Of Care Reviewed With --  patient;spouse   Patient Care Overview   Progress --  improving   Outcome Evaluation   Outcome Summary/Follow up Plan VSS, less redness in right hand, and pt not complaining of pain. --        Goal: Adult Individualization and Mutuality  Outcome: Ongoing (interventions implemented as appropriate)  Goal: Discharge Needs Assessment  Outcome: Ongoing (interventions implemented as appropriate)    Problem: Skin Integrity Impairment, Risk/Actual (Adult)  Goal: Skin Integrity/Wound Healing  Outcome: Ongoing (interventions implemented as appropriate)    Problem: Pain, Acute (Adult)  Goal: Acceptable Pain Control/Comfort Level  Outcome: Ongoing (interventions implemented as appropriate)

## 2017-04-11 NOTE — PROGRESS NOTES
[facility] Medicine Services  INPATIENT PROGRESS NOTE    Length of Stay: 2  Date of Admission: 4/9/2017  Primary Care Physician: Fede Hawkins MD    Subjective   Subjective:  Feels better, no chest pain no abdominal pain no nausea no vomiting, no sob   Review of Systems     All pertinent negatives and positives are as above. All other systems have been reviewed and are negative unless otherwise stated.     Objective    Temp:  [96.6 °F (35.9 °C)-97.5 °F (36.4 °C)] 96.8 °F (36 °C)  Heart Rate:  [67-95] 67  Resp:  [18-20] 20  BP: (137-148)/(70-81) 148/72  Physical Exam    Patient appears well, alert and oriented x 3, pleasant, cooperative.   Neck supple  No JVD No thyromegaly.  Lungs are clear to auscultation. No crackles no wheezing   CV S1S2 normal, no murmurs, clicks, gallops or rubs.  Abdomen is soft, no tenderness, masses or organomegaly.    Extremities: no clubbing cyanosis or edema   Neurological CN 2-12 intact   Skin R hand cellulitis better     No current facility-administered medications on file prior to encounter.      Current Outpatient Prescriptions on File Prior to Encounter   Medication Sig Dispense Refill   • amLODIPine (NORVASC) 10 MG tablet Take 1 tablet by mouth Daily. 90 tablet 3   • aspirin 81 MG tablet Take 81 mg by mouth daily.     • atenolol (TENORMIN) 100 MG tablet Take 1 tablet by mouth Daily. REPLACES THE COMBO MED DUE TO DOSING 90 tablet 3   • atorvastatin (LIPITOR) 80 MG tablet Take 1 tablet by mouth Daily. 90 tablet 3   • carBAMazepine XR (TEGretol  XR) 100 MG 12 hr tablet TAKE 4 TABLETS BY MOUTH EVERY DAY IN THE MORNING, 3 TABLETS EVERY DAY AT SUPPER, AND 3 TABLETS AT NIGHT 300 tablet 5   • cephalexin (KEFLEX) 500 MG capsule Take 1 capsule by mouth 3 (Three) Times a Day for 10 days. For infection.  Start 4/7/17 30 capsule 0   • colestipol (COLESTID) 1 G tablet Take 2 g by mouth 2 (two) times a day.     • doxazosin (CARDURA) 4 MG tablet Take 1  "tablet by mouth Every Night. 90 tablet 3   • glucose blood (ACCU-CHEK ENDER) test strip Test sugars up to 4 times daily as directed by Physician.     • insulin glargine (LANTUS) 100 UNIT/ML injection Inject 176 Units under the skin Daily. (Dosage Increased due to being on Prednisone) 12 mL 3   • Insulin Syringe-Needle U-100 30G X 7/16\" 1 ML misc daily.     • isosorbide mononitrate (IMDUR) 30 MG 24 hr tablet Take 1 tablet by mouth Daily. Take 1/2 pill once a day 30 tablet 5   • lisinopril (PRINIVIL,ZESTRIL) 40 MG tablet Take 1 tablet by mouth 2 (Two) Times a Day. 60 tablet 5   • magnesium oxide (MAGOX) 400 (241.3 MG) MG tablet tablet Take 400 mg by mouth Daily.     • metFORMIN (GLUCOPHAGE) 500 MG tablet Take 1 tablet by mouth 2 (Two) Times a Day. 60 tablet 5   • Multiple Vitamins-Minerals (ICAPS PO) Take  by mouth daily.     • OMEGA-3 FATTY ACIDS-VITAMIN E PO Take  by mouth.     • omeprazole (priLOSEC) 40 MG capsule Take 1 capsule by mouth Daily. 30 capsule 5   • prasugrel (EFFIENT) 10 MG tablet Take 10 mg by mouth.     • ranolazine (RANEXA) 1000 MG 12 hr tablet Take 500 mg by mouth Daily.     • RELION ULTRA THIN PLUS LANCETS misc test once daily     • SITagliptin (JANUVIA) 100 MG tablet Take 1 tablet by mouth Daily. 30 tablet 5   • spironolactone (ALDACTONE) 25 MG tablet Take 1 tablet by mouth Daily. 90 tablet 3   • benzonatate (TESSALON) 100 MG capsule Take 1 capsule by mouth 3 (Three) Times a Day As Needed for cough for up to 30 doses. 30 capsule 0   • nitroglycerin (NITROSTAT) 0.4 MG SL tablet Place 0.4 mg under the tongue as needed for chest pain. repeat X 1 in 5 min. if not relieved and then got to ER or call an ambulance           Results Review:  I have reviewed the labs, radiology results, and diagnostic studies.    Laboratory Data:  [unfilled]  Lab Results (last 24 hours)     Procedure Component Value Units Date/Time    POC Glucose Fingerstick [[id number]]  (Abnormal) Collected:  04/09/17 2021    Specimen:  " Blood Updated:  04/10/17 1047     Glucose 173 (H) mg/dL       Meter: [id number]: [id number] GANGA VILLAGOMEZ       Blood Culture [[id number]]  (Normal) Collected:  04/09/17 0953    Specimen:  Blood from Arm, Left Updated:  04/10/17 1101     Blood Culture No growth at 24 hours    POC Glucose Fingerstick [[id number]]  (Abnormal) Collected:  04/10/17 1037    Specimen:  Blood Updated:  04/10/17 1116     Glucose 273 (H) mg/dL       RN NotifiedMeter: [id number]: [id number] MARNIE PORTILLO       Blood Culture [[id number]]  (Normal) Collected:  04/09/17 1236    Specimen:  Blood from Arm, Right Updated:  04/10/17 1301     Blood Culture No growth at 24 hours    POC Glucose Fingerstick [[id number]]  (Abnormal) Collected:  04/10/17 1638    Specimen:  Blood Updated:  04/11/17 0416     Glucose 245 (H) mg/dL       RN NotifiedMeter: [id number]: [id number] PRASHANTH JIMENEZ       POC Glucose Fingerstick [[id number]]  (Abnormal) Collected:  04/10/17 2035    Specimen:  Blood Updated:  04/11/17 0418     Glucose 258 (H) mg/dL       Sliding Scale AdminMeter: [id number]: [id number] ROSA ALVARADO       POC Glucose Fingerstick [[id number]]  (Abnormal) Collected:  04/11/17 0636    Specimen:  Blood Updated:  04/11/17 0650     Glucose 260 (H) mg/dL       RN NotifiedMeter: [id number]: [id number] [facility]       CBC Auto Differential [[id number]]  (Abnormal) Collected:  04/11/17 0707    Specimen:  Blood Updated:  04/11/17 0738     WBC 5.44 10*3/mm3      RBC 3.96 (L) 10*6/mm3      Hemoglobin 12.8 (L) g/dL      Hematocrit 35.7 (L) %      MCV 90.2 fL      MCH 32.3 pg      MCHC 35.9 g/dL      RDW 12.0 %      RDW-SD 39.3 fl      MPV 10.2 fL      Platelets 145 (L) 10*3/mm3      Neutrophil % 59.0 %      Lymphocyte % 30.5 %      Monocyte % 8.8 %      Eosinophil % 1.1 %      Basophil % 0.4 %      Immature Grans % 0.2 %      Neutrophils, Absolute 3.21 10*3/mm3      Lymphocytes, Absolute 1.66 10*3/mm3      Monocytes, Absolute  0.48 10*3/mm3      Eosinophils, Absolute 0.06 10*3/mm3      Basophils, Absolute 0.02 10*3/mm3      Immature Grans, Absolute 0.01 10*3/mm3     CBC & Differential [[id number]] Collected:  04/11/17 0707    Specimen:  Blood Updated:  04/11/17 0738    Narrative:       The following orders were created for panel order CBC & Differential.  Procedure                               Abnormality         Status                     ---------                               -----------         ------                     CBC Auto Differential[[id number]]         Abnormal            Final result                 Please view results for these tests on the individual orders.    Comprehensive Metabolic Panel [[id number]]  (Abnormal) Collected:  04/11/17 0707    Specimen:  Blood Updated:  04/11/17 0752     Glucose 268 (H) mg/dL      BUN 16 mg/dL      Creatinine 0.74 mg/dL      Sodium 141 mmol/L      Potassium 4.6 mmol/L      Chloride 103 mmol/L      CO2 25.0 mmol/L      Calcium 8.3 (L) mg/dL      Total Protein 6.6 g/dL      Albumin 4.00 g/dL      ALT (SGPT) 31 U/L      AST (SGOT) 25 U/L      Alkaline Phosphatase 70 U/L      Total Bilirubin 0.4 mg/dL      eGFR Non African Amer 105 mL/min/1.73      Globulin 2.6 gm/dL      A/G Ratio 1.5 g/dL      BUN/Creatinine Ratio 21.6     Anion Gap 13.0 mmol/L           Culture Data:   Blood Culture   Date Value Ref Range Status   04/09/2017 No growth at 24 hours  Preliminary   04/09/2017 No growth at 24 hours  Preliminary     No results found for: URINECX  No results found for: RESPCX  No results found for: WOUNDCX  No results found for: STOOLCX  No components found for: BODYFLD    Radiology Data:   Imaging Results (last 24 hours)     ** No results found for the last 24 hours. **          I have reviewed the patient current medications.     Assessment/Plan       Assessment and plan:    1. R hand Cellulitis: better continue IV abxs vancomycin and Unasyn, continue supportive care.  2.  Diabetes type II:  Acceptably controlled, continue on insulin sliding scale, restart Lantus .  3.  Hypertension essential controlled continue outpatient medications.  4.  Hyperlipidemia  5.  Coronary artery disease    Disposition home in 1-2 days    Debbie Turcios MD   04/11/17   10:35 AM

## 2017-04-11 NOTE — PLAN OF CARE
Problem: Patient Care Overview (Adult)  Goal: Plan of Care Review  Outcome: Ongoing (interventions implemented as appropriate)    04/11/17 1431   Coping/Psychosocial Response Interventions   Plan Of Care Reviewed With patient   Patient Care Overview   Progress improving   Outcome Evaluation   Outcome Summary/Follow up Plan VSS, streaking and swelling decreased.        Goal: Adult Individualization and Mutuality  Outcome: Ongoing (interventions implemented as appropriate)  Goal: Discharge Needs Assessment  Outcome: Ongoing (interventions implemented as appropriate)    Problem: Skin Integrity Impairment, Risk/Actual (Adult)  Goal: Skin Integrity/Wound Healing  Outcome: Ongoing (interventions implemented as appropriate)    Problem: Pain, Acute (Adult)  Goal: Acceptable Pain Control/Comfort Level  Outcome: Ongoing (interventions implemented as appropriate)

## 2017-04-12 ENCOUNTER — TELEPHONE (OUTPATIENT)
Dept: FAMILY MEDICINE CLINIC | Facility: CLINIC | Age: 67
End: 2017-04-12

## 2017-04-12 VITALS
WEIGHT: 223.6 LBS | BODY MASS INDEX: 30.28 KG/M2 | TEMPERATURE: 97.1 F | DIASTOLIC BLOOD PRESSURE: 76 MMHG | RESPIRATION RATE: 20 BRPM | HEART RATE: 69 BPM | HEIGHT: 72 IN | SYSTOLIC BLOOD PRESSURE: 164 MMHG | OXYGEN SATURATION: 92 %

## 2017-04-12 DIAGNOSIS — E11.9 TYPE 2 DIABETES MELLITUS WITHOUT COMPLICATION, UNSPECIFIED LONG TERM INSULIN USE STATUS: ICD-10-CM

## 2017-04-12 LAB
ALBUMIN SERPL-MCNC: 3.9 G/DL (ref 3.4–4.8)
ALBUMIN/GLOB SERPL: 1.6 G/DL (ref 1.1–1.8)
ALP SERPL-CCNC: 66 U/L (ref 38–126)
ALT SERPL W P-5'-P-CCNC: 33 U/L (ref 21–72)
ANION GAP SERPL CALCULATED.3IONS-SCNC: 12 MMOL/L (ref 5–15)
AST SERPL-CCNC: 22 U/L (ref 17–59)
BASOPHILS # BLD AUTO: 0.02 10*3/MM3 (ref 0–0.2)
BASOPHILS NFR BLD AUTO: 0.3 % (ref 0–2)
BILIRUB SERPL-MCNC: 0.5 MG/DL (ref 0.2–1.3)
BUN BLD-MCNC: 16 MG/DL (ref 7–21)
BUN/CREAT SERPL: 18.6 (ref 7–25)
CALCIUM SPEC-SCNC: 8.3 MG/DL (ref 8.4–10.2)
CHLORIDE SERPL-SCNC: 104 MMOL/L (ref 95–110)
CO2 SERPL-SCNC: 24 MMOL/L (ref 22–31)
CREAT BLD-MCNC: 0.86 MG/DL (ref 0.7–1.3)
DEPRECATED RDW RBC AUTO: 39.2 FL (ref 35.1–43.9)
EOSINOPHIL # BLD AUTO: 0.05 10*3/MM3 (ref 0–0.7)
EOSINOPHIL NFR BLD AUTO: 0.9 % (ref 0–7)
ERYTHROCYTE [DISTWIDTH] IN BLOOD BY AUTOMATED COUNT: 12 % (ref 11.5–14.5)
GFR SERPL CREATININE-BSD FRML MDRD: 88 ML/MIN/1.73 (ref 49–113)
GLOBULIN UR ELPH-MCNC: 2.5 GM/DL (ref 2.3–3.5)
GLUCOSE BLD-MCNC: 259 MG/DL (ref 60–100)
GLUCOSE BLDC GLUCOMTR-MCNC: 244 MG/DL (ref 70–130)
GLUCOSE BLDC GLUCOMTR-MCNC: 299 MG/DL (ref 70–130)
GLUCOSE BLDC GLUCOMTR-MCNC: 356 MG/DL (ref 70–130)
HCT VFR BLD AUTO: 33.4 % (ref 39–49)
HGB BLD-MCNC: 12 G/DL (ref 13.7–17.3)
IMM GRANULOCYTES # BLD: 0.02 10*3/MM3 (ref 0–0.02)
IMM GRANULOCYTES NFR BLD: 0.3 % (ref 0–0.5)
LYMPHOCYTES # BLD AUTO: 1.48 10*3/MM3 (ref 0.6–4.2)
LYMPHOCYTES NFR BLD AUTO: 25.6 % (ref 10–50)
MCH RBC QN AUTO: 32.1 PG (ref 26.5–34)
MCHC RBC AUTO-ENTMCNC: 35.9 G/DL (ref 31.5–36.3)
MCV RBC AUTO: 89.3 FL (ref 80–98)
MONOCYTES # BLD AUTO: 0.54 10*3/MM3 (ref 0–0.9)
MONOCYTES NFR BLD AUTO: 9.4 % (ref 0–12)
NEUTROPHILS # BLD AUTO: 3.66 10*3/MM3 (ref 2–8.6)
NEUTROPHILS NFR BLD AUTO: 63.5 % (ref 37–80)
PLATELET # BLD AUTO: 141 10*3/MM3 (ref 150–450)
PMV BLD AUTO: 10.1 FL (ref 8–12)
POTASSIUM BLD-SCNC: 4.5 MMOL/L (ref 3.5–5.1)
PROT SERPL-MCNC: 6.4 G/DL (ref 6.3–8.6)
RBC # BLD AUTO: 3.74 10*6/MM3 (ref 4.37–5.74)
SODIUM BLD-SCNC: 140 MMOL/L (ref 137–145)
WBC NRBC COR # BLD: 5.77 10*3/MM3 (ref 3.2–9.8)

## 2017-04-12 PROCEDURE — 85025 COMPLETE CBC W/AUTO DIFF WBC: CPT | Performed by: INTERNAL MEDICINE

## 2017-04-12 PROCEDURE — 25010000002 KETOROLAC TROMETHAMINE PER 15 MG: Performed by: INTERNAL MEDICINE

## 2017-04-12 PROCEDURE — 82962 GLUCOSE BLOOD TEST: CPT

## 2017-04-12 PROCEDURE — 80053 COMPREHEN METABOLIC PANEL: CPT | Performed by: INTERNAL MEDICINE

## 2017-04-12 PROCEDURE — 25010000002 VANCOMYCIN PER 500 MG: Performed by: INTERNAL MEDICINE

## 2017-04-12 PROCEDURE — 63710000001 INSULIN ASPART PER 5 UNITS: Performed by: INTERNAL MEDICINE

## 2017-04-12 RX ORDER — INSULIN GLARGINE 100 [IU]/ML
176 INJECTION, SOLUTION SUBCUTANEOUS NIGHTLY
Qty: 50 ML | Refills: 5 | Status: SHIPPED | OUTPATIENT
Start: 2017-04-12 | End: 2017-10-09 | Stop reason: SDUPTHER

## 2017-04-12 RX ORDER — SULFAMETHOXAZOLE AND TRIMETHOPRIM 800; 160 MG/1; MG/1
1 TABLET ORAL 2 TIMES DAILY
Qty: 14 TABLET | Refills: 0 | Status: SHIPPED | OUTPATIENT
Start: 2017-04-12 | End: 2017-04-19

## 2017-04-12 RX ADMIN — KETOROLAC TROMETHAMINE 15 MG: 30 INJECTION, SOLUTION INTRAMUSCULAR; INTRAVENOUS at 00:19

## 2017-04-12 RX ADMIN — CARBAMAZEPINE 400 MG: 200 TABLET ORAL at 05:39

## 2017-04-12 RX ADMIN — METFORMIN HYDROCHLORIDE 500 MG: 500 TABLET ORAL at 08:47

## 2017-04-12 RX ADMIN — INSULIN ASPART 4 UNITS: 100 INJECTION, SOLUTION INTRAVENOUS; SUBCUTANEOUS at 08:45

## 2017-04-12 RX ADMIN — ISOSORBIDE MONONITRATE 30 MG: 30 TABLET, EXTENDED RELEASE ORAL at 08:47

## 2017-04-12 RX ADMIN — MAGNESIUM OXIDE TAB 400 MG (241.3 MG ELEMENTAL MG) 400 MG: 400 (241.3 MG) TAB at 08:46

## 2017-04-12 RX ADMIN — PANTOPRAZOLE SODIUM 40 MG: 40 TABLET, DELAYED RELEASE ORAL at 05:39

## 2017-04-12 RX ADMIN — VANCOMYCIN HYDROCHLORIDE 1500 MG: 5 INJECTION, POWDER, LYOPHILIZED, FOR SOLUTION INTRAVENOUS at 08:57

## 2017-04-12 RX ADMIN — SODIUM CHLORIDE 100 ML/HR: 900 INJECTION, SOLUTION INTRAVENOUS at 00:19

## 2017-04-12 RX ADMIN — ATORVASTATIN CALCIUM 80 MG: 40 TABLET, FILM COATED ORAL at 08:48

## 2017-04-12 RX ADMIN — AMPICILLIN AND SULBACTAM 1.5 G: 1; .5 INJECTION, POWDER, FOR SOLUTION INTRAMUSCULAR; INTRAVENOUS at 05:40

## 2017-04-12 RX ADMIN — INSULIN ASPART 4 UNITS: 100 INJECTION, SOLUTION INTRAVENOUS; SUBCUTANEOUS at 11:08

## 2017-04-12 RX ADMIN — AMLODIPINE BESYLATE 10 MG: 10 TABLET ORAL at 08:47

## 2017-04-12 RX ADMIN — SPIRONOLACTONE 25 MG: 25 TABLET ORAL at 08:47

## 2017-04-12 RX ADMIN — Medication 1 TABLET: at 08:46

## 2017-04-12 RX ADMIN — ATENOLOL 100 MG: 50 TABLET ORAL at 08:47

## 2017-04-12 RX ADMIN — ASPIRIN 81 MG: 81 TABLET, CHEWABLE ORAL at 08:47

## 2017-04-12 RX ADMIN — RANOLAZINE 500 MG: 500 TABLET, FILM COATED, EXTENDED RELEASE ORAL at 08:47

## 2017-04-12 RX ADMIN — PRASUGREL HYDROCHLORIDE 10 MG: 10 TABLET, FILM COATED ORAL at 08:46

## 2017-04-12 RX ADMIN — LISINOPRIL 20 MG: 20 TABLET ORAL at 08:47

## 2017-04-12 NOTE — DISCHARGE SUMMARY
[facility] Medicine Services  DISCHARGE SUMMARY       Date of Admission: 4/9/2017  Date of Discharge:  4/12/2017  Primary Care Physician: Fede Hawkins MD    Presenting Problem/History of Present Illness:  Cellulitis of right hand [L03.113]  Cellulitis of right hand [L03.113]       Final Discharge Diagnoses:  Hospital Problem List     Cellulitis of right hand          Consults:   Consults     Date and Time Order Name Status Description    4/9/2017 1122 Hospitalist (on-call MD unless specified)            Procedures Performed:                   Hospital Course:  The patient is a 66 y.o. male who presented to [facility] with one-week history of right third finger swelling and erythema, also involving his right hand and arm, he was found to have cellulitis in the hand and arm were treated with IV antibiotics vancomycin and Unasyn, his symptoms significantly improved, cellulitis is much better.  He will be discharged home to continue Bactrim.    Condition on Discharge:  Stable      Discharge Disposition:  Home or Self Care    Discharge Medications:   Beka Zavaleta   Home Medication Instructions NANCY:[id number]    Printed on:04/12/17 1243   Medication Information                      amLODIPine (NORVASC) 10 MG tablet  Take 1 tablet by mouth Daily.             aspirin 81 MG tablet  Take 81 mg by mouth daily.             atenolol (TENORMIN) 100 MG tablet  Take 1 tablet by mouth Daily. REPLACES THE COMBO MED DUE TO DOSING             atorvastatin (LIPITOR) 80 MG tablet  Take 1 tablet by mouth Daily.             benzonatate (TESSALON) 100 MG capsule  Take 1 capsule by mouth 3 (Three) Times a Day As Needed for cough for up to 30 doses.             carBAMazepine XR (TEGretol  XR) 100 MG 12 hr tablet  TAKE 4 TABLETS BY MOUTH EVERY DAY IN THE MORNING, 3 TABLETS EVERY DAY AT SUPPER, AND 3 TABLETS AT NIGHT             colestipol (COLESTID) 1 G tablet  Take 2 g by  "mouth 2 (two) times a day.             doxazosin (CARDURA) 4 MG tablet  Take 1 tablet by mouth Every Night.             glucose blood (ACCU-CHEK ENDER) test strip  Test sugars up to 4 times daily as directed by Physician.             insulin glargine (LANTUS) 100 UNIT/ML injection  Inject 176 Units under the skin Daily. (Dosage Increased due to being on Prednisone)             Insulin Syringe-Needle U-100 30G X 7/16\" 1 ML misc  daily.             isosorbide mononitrate (IMDUR) 30 MG 24 hr tablet  Take 1 tablet by mouth Daily. Take 1/2 pill once a day             lisinopril (PRINIVIL,ZESTRIL) 40 MG tablet  Take 1 tablet by mouth 2 (Two) Times a Day.             magnesium oxide (MAGOX) 400 (241.3 MG) MG tablet tablet  Take 400 mg by mouth Daily.             metFORMIN (GLUCOPHAGE) 500 MG tablet  Take 1 tablet by mouth 2 (Two) Times a Day.             Multiple Vitamins-Minerals (ICAPS PO)  Take  by mouth daily.             nitroglycerin (NITROSTAT) 0.4 MG SL tablet  Place 0.4 mg under the tongue as needed for chest pain. repeat X 1 in 5 min. if not relieved and then got to ER or call an ambulance             OMEGA-3 FATTY ACIDS-VITAMIN E PO  Take  by mouth.             omeprazole (priLOSEC) 40 MG capsule  Take 1 capsule by mouth Daily.             prasugrel (EFFIENT) 10 MG tablet  Take 10 mg by mouth.             ranolazine (RANEXA) 1000 MG 12 hr tablet  Take 500 mg by mouth Daily.             RELION ULTRA THIN PLUS LANCETS misc  test once daily             SITagliptin (JANUVIA) 100 MG tablet  Take 1 tablet by mouth Daily.             spironolactone (ALDACTONE) 25 MG tablet  Take 1 tablet by mouth Daily.             sulfamethoxazole-trimethoprim (BACTRIM DS) 800-160 MG per tablet  Take 1 tablet by mouth 2 (Two) Times a Day for 7 days.                 Discharge Diet:   Diet Instructions     Diet: Consistent Carbohydrate; Thin Liquids, No Restrictions       Discharge Diet:  Consistent Carbohydrate   Fluid Consistency:  " Thin Liquids, No Restrictions                 Activity at Discharge:   Activity Instructions     Activity as Tolerated                         Follow-up Appointments:   Future Appointments  Date Time Provider Department Center   5/12/2017 8:30 AM Fede Hawkins MD [facility] None   6/26/2017 8:15 AM Sami Lopez MD [facility] None     PCP 1 week   Test Results Pending at Discharge:  Order Current Status    POC Glucose Fingerstick In process    Blood Culture Preliminary result    Blood Culture Preliminary result          Debbie Turcios MD  04/12/17  12:43 PM

## 2017-04-12 NOTE — PLAN OF CARE
Problem: Patient Care Overview (Adult)  Goal: Plan of Care Review  Outcome: Ongoing (interventions implemented as appropriate)    04/12/17 0419   Coping/Psychosocial Response Interventions   Plan Of Care Reviewed With patient   Patient Care Overview   Progress improving   Outcome Evaluation   Outcome Summary/Follow up Plan pt rested well during the night       Goal: Adult Individualization and Mutuality  Outcome: Ongoing (interventions implemented as appropriate)  Goal: Discharge Needs Assessment  Outcome: Ongoing (interventions implemented as appropriate)    Problem: Skin Integrity Impairment, Risk/Actual (Adult)  Goal: Skin Integrity/Wound Healing  Outcome: Ongoing (interventions implemented as appropriate)    Problem: Pain, Acute (Adult)  Goal: Acceptable Pain Control/Comfort Level  Outcome: Ongoing (interventions implemented as appropriate)

## 2017-04-12 NOTE — TELEPHONE ENCOUNTER
Requested Refills Sent  I have called Ms. Zavaleta to let her know that this is the first refill request that I had received.  She states she called on Friday 4/10/17 requesting the refills and was told there was nothing that could be done.  I gave her my direct line and told her when she needed refills to call me directly and I would take care of her refills in the future.     New Script sent to [facility] in [place]. For five 10 ml vials which will las him 29 days with 5 refills .     ---- Message from Janice Burrell sent at 4/12/2017  1:21 PM CDT -----  Regarding: BRANDIN DUBOIS  Contact: [phone]  WIFE OF NETTA ZAVALETA CALLED LAST FRIDAY FOR REFILL ON HIS LANTUS VIALS TO BE SENT TO -[facility]...STILL NOT DONE.he IS USING MUCH MORE NOW  176 UNITNS  AND PRESP NEEDS TO BE CHANGED

## 2017-04-13 LAB
GLUCOSE BLDC GLUCOMTR-MCNC: 268 MG/DL (ref 70–130)
GLUCOSE BLDC GLUCOMTR-MCNC: 316 MG/DL (ref 70–130)

## 2017-04-14 LAB
BACTERIA SPEC AEROBE CULT: NORMAL
BACTERIA SPEC AEROBE CULT: NORMAL

## 2017-04-19 ENCOUNTER — OFFICE VISIT (OUTPATIENT)
Dept: FAMILY MEDICINE CLINIC | Facility: CLINIC | Age: 67
End: 2017-04-19

## 2017-04-19 VITALS
HEIGHT: 72 IN | BODY MASS INDEX: 31.02 KG/M2 | WEIGHT: 229 LBS | DIASTOLIC BLOOD PRESSURE: 82 MMHG | SYSTOLIC BLOOD PRESSURE: 130 MMHG

## 2017-04-19 DIAGNOSIS — L03.011 CELLULITIS OF FINGER OF RIGHT HAND: Primary | ICD-10-CM

## 2017-04-19 PROCEDURE — 99213 OFFICE O/P EST LOW 20 MIN: CPT | Performed by: NURSE PRACTITIONER

## 2017-04-19 RX ORDER — SULFAMETHOXAZOLE AND TRIMETHOPRIM 800; 160 MG/1; MG/1
1 TABLET ORAL 2 TIMES DAILY
Qty: 20 TABLET | Refills: 0 | Status: SHIPPED | OUTPATIENT
Start: 2017-04-19 | End: 2017-05-12

## 2017-04-19 NOTE — PROGRESS NOTES
"  Chief Complaint   Patient presents with   • Follow-up     1 week hospital f/u cellulitis     Subjective   eBka Zavaleta is a 66 y.o. male.     Hand Pain    The incident occurred more than 1 week ago. The incident occurred at home. There was no injury mechanism. Pain location: right hand middle finger. The quality of the pain is described as burning and cramping. The pain does not radiate. The pain is at a severity of 3/10. The pain is mild. The pain has been fluctuating since the incident. Pertinent negatives include no chest pain, muscle weakness, numbness or tingling. The symptoms are aggravated by movement. He has tried acetaminophen, elevation, heat, ice, immobilization, NSAIDs and rest (iv and oral antibiotics ) for the symptoms. The treatment provided moderate relief.        The following portions of the patient's history were reviewed and updated as appropriate: allergies, current medications, past social history and problem list.    Review of Systems   Constitutional: Negative.    HENT: Negative.    Eyes: Negative.    Respiratory: Negative.    Cardiovascular: Negative.  Negative for chest pain.   Gastrointestinal: Negative.    Endocrine: Negative.    Genitourinary: Negative.    Musculoskeletal: Negative.    Skin: Negative.         Swelling right hand 3rd digit    Allergic/Immunologic: Negative.    Neurological: Negative.  Negative for tingling and numbness.   Hematological: Negative.    Psychiatric/Behavioral: Negative.        Objective   /82  Ht 72\" (182.9 cm)  Wt 229 lb (104 kg)  BMI 31.06 kg/m2  Physical Exam   Constitutional: He is oriented to person, place, and time. He appears well-developed and well-nourished. No distress.   HENT:   Head: Normocephalic and atraumatic.   Eyes: Pupils are equal, round, and reactive to light.   Neck: Normal range of motion.   Cardiovascular: Normal rate, regular rhythm and normal heart sounds.  Exam reveals no gallop and no friction rub.    No murmur " heard.  Pulmonary/Chest: Effort normal and breath sounds normal. No respiratory distress. He has no wheezes. He has no rales. He exhibits no tenderness.   Neurological: He is alert and oriented to person, place, and time.   Skin: Skin is warm and dry. He is not diaphoretic.   Right hand dorsal aspect red and slightly swollen tender to touch -pain with flexion -peeling around the wound    Psychiatric: He has a normal mood and affect. His behavior is normal. Judgment and thought content normal.   Nursing note and vitals reviewed.      Assessment/Plan   Problem List Items Addressed This Visit        Other    Cellulitis of finger of right hand - Primary           New Medications Ordered This Visit   Medications   • sulfamethoxazole-trimethoprim (BACTRIM DS) 800-160 MG per tablet     Sig: Take 1 tablet by mouth 2 (Two) Times a Day.     Dispense:  20 tablet     Refill:  0         epson salt soaks as directed, add  Yogurt to meds to avoid c diff-monitor bs-follow up if worsen-tdap was utd

## 2017-05-02 ENCOUNTER — TELEPHONE (OUTPATIENT)
Dept: FAMILY MEDICINE CLINIC | Facility: CLINIC | Age: 67
End: 2017-05-02

## 2017-05-02 RX ORDER — MONTELUKAST SODIUM 4 MG/1
2 TABLET, CHEWABLE ORAL 2 TIMES DAILY
Qty: 60 TABLET | Refills: 5 | Status: SHIPPED | OUTPATIENT
Start: 2017-05-02 | End: 2017-07-05 | Stop reason: SDUPTHER

## 2017-05-08 ENCOUNTER — TELEPHONE (OUTPATIENT)
Dept: FAMILY MEDICINE CLINIC | Facility: CLINIC | Age: 67
End: 2017-05-08

## 2017-05-12 ENCOUNTER — LAB (OUTPATIENT)
Dept: LAB | Facility: HOSPITAL | Age: 67
End: 2017-05-12

## 2017-05-12 ENCOUNTER — OFFICE VISIT (OUTPATIENT)
Dept: FAMILY MEDICINE CLINIC | Facility: CLINIC | Age: 67
End: 2017-05-12

## 2017-05-12 VITALS
HEART RATE: 72 BPM | HEIGHT: 72 IN | DIASTOLIC BLOOD PRESSURE: 74 MMHG | WEIGHT: 224.5 LBS | SYSTOLIC BLOOD PRESSURE: 142 MMHG | OXYGEN SATURATION: 96 % | BODY MASS INDEX: 30.41 KG/M2

## 2017-05-12 DIAGNOSIS — Z79.4 UNCONTROLLED TYPE 2 DIABETES MELLITUS WITH HYPERGLYCEMIA, WITH LONG-TERM CURRENT USE OF INSULIN (HCC): ICD-10-CM

## 2017-05-12 DIAGNOSIS — K21.9 GASTROESOPHAGEAL REFLUX DISEASE WITHOUT ESOPHAGITIS: ICD-10-CM

## 2017-05-12 DIAGNOSIS — I25.10 ATHEROSCLEROSIS OF NATIVE CORONARY ARTERY OF NATIVE HEART WITHOUT ANGINA PECTORIS: ICD-10-CM

## 2017-05-12 DIAGNOSIS — E78.00 HYPERCHOLESTEROLEMIA: ICD-10-CM

## 2017-05-12 DIAGNOSIS — D69.6 PLATELETS DECREASED (HCC): ICD-10-CM

## 2017-05-12 DIAGNOSIS — Z79.899 HIGH RISK MEDICATION USE: ICD-10-CM

## 2017-05-12 DIAGNOSIS — I10 ESSENTIAL HYPERTENSION: ICD-10-CM

## 2017-05-12 DIAGNOSIS — E11.65 UNCONTROLLED TYPE 2 DIABETES MELLITUS WITH HYPERGLYCEMIA, WITH LONG-TERM CURRENT USE OF INSULIN (HCC): ICD-10-CM

## 2017-05-12 DIAGNOSIS — R74.01 ELEVATED AST (SGOT): ICD-10-CM

## 2017-05-12 DIAGNOSIS — K21.9 GASTROESOPHAGEAL REFLUX DISEASE WITHOUT ESOPHAGITIS: Primary | ICD-10-CM

## 2017-05-12 DIAGNOSIS — IMO0001 UNCONTROLLED TYPE 2 DIABETES MELLITUS WITHOUT COMPLICATION, WITH LONG-TERM CURRENT USE OF INSULIN: ICD-10-CM

## 2017-05-12 DIAGNOSIS — E53.8 COBALAMIN DEFICIENCY: ICD-10-CM

## 2017-05-12 LAB
ALBUMIN SERPL-MCNC: 4.3 G/DL (ref 3.4–4.8)
ALBUMIN UR-MCNC: 1.3 MG/L
ALBUMIN/GLOB SERPL: 1.4 G/DL (ref 1.1–1.8)
ALP SERPL-CCNC: 60 U/L (ref 38–126)
ALT SERPL W P-5'-P-CCNC: 36 U/L (ref 21–72)
ANION GAP SERPL CALCULATED.3IONS-SCNC: 12 MMOL/L (ref 5–15)
ARTICHOKE IGE QN: 55 MG/DL (ref 1–129)
AST SERPL-CCNC: 62 U/L (ref 17–59)
BASOPHILS # BLD AUTO: 0.02 10*3/MM3 (ref 0–0.2)
BASOPHILS NFR BLD AUTO: 0.4 % (ref 0–2)
BILIRUB SERPL-MCNC: 0.5 MG/DL (ref 0.2–1.3)
BUN BLD-MCNC: 20 MG/DL (ref 7–21)
BUN/CREAT SERPL: 25.6 (ref 7–25)
CALCIUM SPEC-SCNC: 9.2 MG/DL (ref 8.4–10.2)
CHLORIDE SERPL-SCNC: 106 MMOL/L (ref 95–110)
CHOLEST SERPL-MCNC: 127 MG/DL (ref 0–199)
CO2 SERPL-SCNC: 27 MMOL/L (ref 22–31)
CREAT BLD-MCNC: 0.78 MG/DL (ref 0.7–1.3)
CREAT UR-MCNC: 175.5 MG/DL
DEPRECATED RDW RBC AUTO: 42.3 FL (ref 35.1–43.9)
EOSINOPHIL # BLD AUTO: 0.05 10*3/MM3 (ref 0–0.7)
EOSINOPHIL NFR BLD AUTO: 1 % (ref 0–7)
ERYTHROCYTE [DISTWIDTH] IN BLOOD BY AUTOMATED COUNT: 12.8 % (ref 11.5–14.5)
GFR SERPL CREATININE-BSD FRML MDRD: 99 ML/MIN/1.73 (ref 49–113)
GLOBULIN UR ELPH-MCNC: 3 GM/DL (ref 2.3–3.5)
GLUCOSE BLD-MCNC: 122 MG/DL (ref 60–100)
HAV IGM SERPL QL IA: NEGATIVE
HBA1C MFR BLD: 7.68 % (ref 4–5.6)
HBV CORE IGM SERPL QL IA: NEGATIVE
HBV SURFACE AG SERPL QL IA: NEGATIVE
HCT VFR BLD AUTO: 35.5 % (ref 39–49)
HCV AB SER DONR QL: NEGATIVE
HDLC SERPL-MCNC: 39 MG/DL (ref 60–200)
HGB BLD-MCNC: 12.5 G/DL (ref 13.7–17.3)
IMM GRANULOCYTES # BLD: 0.01 10*3/MM3 (ref 0–0.02)
IMM GRANULOCYTES NFR BLD: 0.2 % (ref 0–0.5)
LDLC/HDLC SERPL: 1.34 {RATIO} (ref 0–3.55)
LYMPHOCYTES # BLD AUTO: 1.69 10*3/MM3 (ref 0.6–4.2)
LYMPHOCYTES NFR BLD AUTO: 34.5 % (ref 10–50)
MAGNESIUM SERPL-MCNC: 1.8 MG/DL (ref 1.6–2.3)
MCH RBC QN AUTO: 32.2 PG (ref 26.5–34)
MCHC RBC AUTO-ENTMCNC: 35.2 G/DL (ref 31.5–36.3)
MCV RBC AUTO: 91.5 FL (ref 80–98)
MICROALBUMIN/CREAT UR: 7.4 MG/G (ref 0–30)
MONOCYTES # BLD AUTO: 0.42 10*3/MM3 (ref 0–0.9)
MONOCYTES NFR BLD AUTO: 8.6 % (ref 0–12)
NEUTROPHILS # BLD AUTO: 2.71 10*3/MM3 (ref 2–8.6)
NEUTROPHILS NFR BLD AUTO: 55.3 % (ref 37–80)
PLATELET # BLD AUTO: 139 10*3/MM3 (ref 150–450)
PMV BLD AUTO: 10 FL (ref 8–12)
POTASSIUM BLD-SCNC: 4.4 MMOL/L (ref 3.5–5.1)
PROT SERPL-MCNC: 7.3 G/DL (ref 6.3–8.6)
RBC # BLD AUTO: 3.88 10*6/MM3 (ref 4.37–5.74)
SODIUM BLD-SCNC: 145 MMOL/L (ref 137–145)
T4 FREE SERPL-MCNC: 0.79 NG/DL (ref 0.78–2.19)
TRIGL SERPL-MCNC: 178 MG/DL (ref 20–199)
TSH SERPL DL<=0.05 MIU/L-ACNC: 0.36 MIU/ML (ref 0.46–4.68)
VIT B12 BLD-MCNC: 358 PG/ML (ref 239–931)
WBC NRBC COR # BLD: 4.9 10*3/MM3 (ref 3.2–9.8)

## 2017-05-12 PROCEDURE — 82043 UR ALBUMIN QUANTITATIVE: CPT | Performed by: FAMILY MEDICINE

## 2017-05-12 PROCEDURE — 85025 COMPLETE CBC W/AUTO DIFF WBC: CPT | Performed by: FAMILY MEDICINE

## 2017-05-12 PROCEDURE — 80061 LIPID PANEL: CPT | Performed by: FAMILY MEDICINE

## 2017-05-12 PROCEDURE — 83735 ASSAY OF MAGNESIUM: CPT | Performed by: FAMILY MEDICINE

## 2017-05-12 PROCEDURE — 36415 COLL VENOUS BLD VENIPUNCTURE: CPT | Performed by: FAMILY MEDICINE

## 2017-05-12 PROCEDURE — 84439 ASSAY OF FREE THYROXINE: CPT | Performed by: FAMILY MEDICINE

## 2017-05-12 PROCEDURE — 99214 OFFICE O/P EST MOD 30 MIN: CPT | Performed by: FAMILY MEDICINE

## 2017-05-12 PROCEDURE — 83036 HEMOGLOBIN GLYCOSYLATED A1C: CPT | Performed by: FAMILY MEDICINE

## 2017-05-12 PROCEDURE — 80053 COMPREHEN METABOLIC PANEL: CPT | Performed by: FAMILY MEDICINE

## 2017-05-12 PROCEDURE — 82607 VITAMIN B-12: CPT | Performed by: FAMILY MEDICINE

## 2017-05-12 PROCEDURE — 82570 ASSAY OF URINE CREATININE: CPT | Performed by: FAMILY MEDICINE

## 2017-05-12 PROCEDURE — 80074 ACUTE HEPATITIS PANEL: CPT | Performed by: FAMILY MEDICINE

## 2017-05-12 PROCEDURE — 84443 ASSAY THYROID STIM HORMONE: CPT | Performed by: FAMILY MEDICINE

## 2017-05-12 RX ORDER — CARBAMAZEPINE 100 MG/1
100 CAPSULE, EXTENDED RELEASE ORAL 2 TIMES DAILY
COMMUNITY
Start: 2017-05-03 | End: 2017-06-21 | Stop reason: SDUPTHER

## 2017-05-16 ENCOUNTER — TELEPHONE (OUTPATIENT)
Dept: FAMILY MEDICINE CLINIC | Facility: CLINIC | Age: 67
End: 2017-05-16

## 2017-06-02 ENCOUNTER — CLINICAL SUPPORT (OUTPATIENT)
Dept: FAMILY MEDICINE CLINIC | Facility: CLINIC | Age: 67
End: 2017-06-02

## 2017-06-02 DIAGNOSIS — D51.0 PERNICIOUS ANEMIA: Primary | ICD-10-CM

## 2017-06-02 PROCEDURE — 96372 THER/PROPH/DIAG INJ SC/IM: CPT | Performed by: INTERNAL MEDICINE

## 2017-06-02 RX ADMIN — CYANOCOBALAMIN 1000 MCG: 1000 INJECTION, SOLUTION INTRAMUSCULAR; SUBCUTANEOUS at 09:49

## 2017-06-19 DIAGNOSIS — M25.512 LEFT SHOULDER PAIN, UNSPECIFIED CHRONICITY: Primary | ICD-10-CM

## 2017-06-20 ENCOUNTER — OFFICE VISIT (OUTPATIENT)
Dept: ORTHOPEDIC SURGERY | Facility: CLINIC | Age: 67
End: 2017-06-20

## 2017-06-20 VITALS — HEIGHT: 72 IN | WEIGHT: 225 LBS | BODY MASS INDEX: 30.48 KG/M2

## 2017-06-20 DIAGNOSIS — M75.102 ROTATOR CUFF SYNDROME, LEFT: ICD-10-CM

## 2017-06-20 DIAGNOSIS — G89.29 CHRONIC LEFT SHOULDER PAIN: Primary | ICD-10-CM

## 2017-06-20 DIAGNOSIS — M75.42 IMPINGEMENT SYNDROME OF LEFT SHOULDER: ICD-10-CM

## 2017-06-20 DIAGNOSIS — M25.512 CHRONIC LEFT SHOULDER PAIN: Primary | ICD-10-CM

## 2017-06-20 DIAGNOSIS — E11.9 TYPE 2 DIABETES MELLITUS WITHOUT COMPLICATION, WITHOUT LONG-TERM CURRENT USE OF INSULIN (HCC): ICD-10-CM

## 2017-06-20 DIAGNOSIS — I10 ESSENTIAL HYPERTENSION: ICD-10-CM

## 2017-06-20 PROCEDURE — 99214 OFFICE O/P EST MOD 30 MIN: CPT | Performed by: ORTHOPAEDIC SURGERY

## 2017-06-20 RX ORDER — MELOXICAM 7.5 MG/1
15 TABLET ORAL DAILY
Status: DISCONTINUED | OUTPATIENT
Start: 2017-06-20 | End: 2018-07-29

## 2017-06-20 NOTE — PROGRESS NOTES
Beka Zavaleta is a 66 y.o. male returns for     Chief Complaint   Patient presents with   • Left Shoulder - Follow-up   • Results     repeat xrays done today in office       HISTORY OF PRESENT ILLNESS:   Patient returns today with left shoulder pain.   Pain in both shoulders with the left much worse than the right.  No specific injury.  He has some tingling in both hands mostly at night.  Intermittent severe pain in the left shoulder but mostly a dull ache.       CONCURRENT MEDICAL HISTORY:    Past Medical History:   Diagnosis Date   • Cobalamin deficiency    • Coronary atherosclerosis     post stent      • Diverticular disease of colon    • Encounter for screening for malignant neoplasm of prostate 08/13/2014   • Essential hypertension    • GERD (gastroesophageal reflux disease)    • Goiter    • History of colon polyps    • History of echocardiogram 05/10/2016    Mild dilated LV with normal LV systolic function, with LVEF of 60%.Diastolic dysfunction of the left ventricle.Moderately left atrial dilatation.Midl mitral and pulmonic valve regurg.Trivial tricuspid valve regurg.No pul.htn.[facility]   • History of Holter monitoring 05/09/2016    Rare PACs and PVCs.One run of SVT with 6 beats at 147 BPM. [facility].   • History of trigeminal neuralgia    • Hypercholesterolemia    • Hyperkalemia    • Impotence    • Malaise and fatigue    • Obstructive sleep apnea syndrome    • Plantar fasciitis    • Type 2 diabetes mellitus    • Type II diabetes mellitus, uncontrolled    • Vitamin deficiency        No Known Allergies      Current Outpatient Prescriptions:   •  amLODIPine (NORVASC) 10 MG tablet, Take 1 tablet by mouth Daily., Disp: 90 tablet, Rfl: 3  •  aspirin 81 MG tablet, Take 81 mg by mouth daily., Disp: , Rfl:   •  atenolol (TENORMIN) 100 MG tablet, Take 1 tablet by mouth Daily. REPLACES THE COMBO MED DUE TO DOSING, Disp: 90 tablet, Rfl: 3  •  atorvastatin (LIPITOR) 80 MG tablet, Take 1 tablet  "by mouth Daily., Disp: 90 tablet, Rfl: 3  •  carBAMazepine (CARBATROL) 100 MG 12 hr capsule, Take 1 capsule by mouth 2 (Two) Times a Day., Disp: 60 capsule, Rfl: 5  •  colestipol (COLESTID) 1 G tablet, Take 2 tablets by mouth 2 (Two) Times a Day., Disp: 60 tablet, Rfl: 5  •  doxazosin (CARDURA) 4 MG tablet, Take 1 tablet by mouth Every Night., Disp: 90 tablet, Rfl: 3  •  Dulaglutide (TRULICITY) 0.75 MG/0.5ML solution pen-injector, Inject 0.75 mg under the skin 1 (One) Time Per Week., Disp: 4 pen, Rfl: 11  •  glucose blood (ACCU-CHEK ENDER) test strip, Test sugars up to 4 times daily as directed by Physician., Disp: , Rfl:   •  insulin glargine (LANTUS) 100 UNIT/ML injection, Inject 176 Units under the skin Every Night. (Dosage Increased due to being on Prednisone), Disp: 50 mL, Rfl: 5  •  Insulin Syringe-Needle U-100 30G X 7/16\" 1 ML misc, daily., Disp: , Rfl:   •  isosorbide mononitrate (IMDUR) 30 MG 24 hr tablet, Take 1 tablet by mouth Daily. Take 1/2 pill once a day, Disp: 30 tablet, Rfl: 5  •  lisinopril (PRINIVIL,ZESTRIL) 40 MG tablet, Take 1 tablet by mouth 2 (Two) Times a Day., Disp: 60 tablet, Rfl: 5  •  magnesium oxide (MAGOX) 400 (241.3 MG) MG tablet tablet, Take 400 mg by mouth Daily., Disp: , Rfl:   •  metFORMIN (GLUCOPHAGE) 500 MG tablet, Take 1 tablet by mouth 2 (Two) Times a Day., Disp: 60 tablet, Rfl: 5  •  Multiple Vitamins-Minerals (ICAPS PO), Take  by mouth daily., Disp: , Rfl:   •  nitroglycerin (NITROSTAT) 0.4 MG SL tablet, Place 0.4 mg under the tongue as needed for chest pain. repeat X 1 in 5 min. if not relieved and then got to ER or call an ambulance, Disp: , Rfl:   •  OMEGA-3 FATTY ACIDS-VITAMIN E PO, Take  by mouth., Disp: , Rfl:   •  omeprazole (priLOSEC) 40 MG capsule, Take 1 capsule by mouth Daily., Disp: 30 capsule, Rfl: 5  •  prasugrel (EFFIENT) 10 MG tablet, Take 10 mg by mouth., Disp: , Rfl:   •  ranolazine (RANEXA) 1000 MG 12 hr tablet, Take 500 mg by mouth Daily., Disp: , Rfl:   • " " RELION ULTRA THIN PLUS LANCETS misc, test once daily, Disp: , Rfl:   •  SITagliptin (JANUVIA) 100 MG tablet, Take 1 tablet by mouth Daily., Disp: 30 tablet, Rfl: 5  •  spironolactone (ALDACTONE) 25 MG tablet, Take 1 tablet by mouth Daily., Disp: 90 tablet, Rfl: 3    Current Facility-Administered Medications:   •  cyanocobalamin injection 1,000 mcg, 1,000 mcg, Intramuscular, Q28 Days, Sundeep Castillo MD, 1,000 mcg at 06/02/17 0949  •  meloxicam (MOBIC) tablet 15 mg, 15 mg, Oral, Daily, Mario Smart MD    Past Surgical History:   Procedure Laterality Date   • CARDIAC CATHETERIZATION  12/18/2014    Stent in LAD widely patent. Distal LAD mild 50% stenosis. Circumflex PDA about 50-70%. RCA nondominant. Circumflex dominant.   • INJECTION OF MEDICATION  07/05/2016    B12 (Cobalamin deficiency) (53)       ROS  No fevers or chills.  No chest pain or shortness of air.  No GI or  disturbances.    PHYSICAL EXAMINATION:       Ht 72\" (182.9 cm)  Wt 225 lb (102 kg)  BMI 30.52 kg/m2    Physical Exam   Constitutional: He is oriented to person, place, and time. He appears well-developed and well-nourished.   Neurological: He is alert and oriented to person, place, and time.   Psychiatric: He has a normal mood and affect. His behavior is normal. Judgment and thought content normal.       GAIT:     []  Normal  []  Antalgic    Assistive device: []  None  []  Walker     []  Crutches  []  Cane     []  Wheelchair  []  Stretcher    Right Shoulder Exam     Tenderness   The patient is experiencing no tenderness.        Range of Motion   Active Abduction: 120   Forward Flexion: 170   Internal Rotation 0 degrees: T10     Muscle Strength   Abduction: 5/5   Supraspinatus: 5/5     Tests   Hawkin's test: negative  Impingement: negative    Other   Erythema: present  Sensation: normal  Pulse: present    Comments:  Stable joint exam      Left Shoulder Exam     Tenderness   The patient is experiencing tenderness in the acromioclavicular " joint.    Range of Motion   Active Abduction: 120   Forward Flexion: 160   Internal Rotation 0 degrees: T10     Muscle Strength   Abduction: 4/5   Supraspinatus: 4/5     Tests   Cross Arm: positive  Hawkin's test: positive  Impingement: positive    Other   Erythema: absent  Sensation: normal  Pulse: present     Comments:  Stable joint exam              Xr Shoulder 2+ View Left    Result Date: 6/20/2017  Narrative: 3 views of the left shoulder show acceptable position and alignment of the glenohumeral joint.  No significant arthritic changes are noted.  The acromioclavicular joint shows mild arthritic change with a type II acromion.  No evidence of acute bony abnormality. 06/20/17 at 1:38 PM by Mario Smart MD     Xr Shoulder 2+ View Right    Result Date: 6/20/2017  Narrative: 3 views of the right  shoulder show acceptable position and alignment of the glenohumeral joint.  No significant arthritic changes are noted.  The acromioclavicular joint shows mild arthritic change with a type II acromion.  No evidence of acute bony abnormality. 06/20/17 at 1:38 PM by Mario Smart MD             ASSESSMENT:    Diagnoses and all orders for this visit:    Chronic left shoulder pain  -     meloxicam (MOBIC) tablet 15 mg; Take 2 tablets by mouth Daily.  -     MRI shoulder left wo contrast; Future    Impingement syndrome of left shoulder    Rotator cuff syndrome, left    Type 2 diabetes mellitus without complication, without long-term current use of insulin    Essential hypertension          PLAN    Will start meloxicam for anti-inflammatory use.  MRI left shoulder to assess for internal derangement/rotator cuff injury.  Otherwise will slowly progress activity as tolerated.  We discussed activity modification and avoiding painful activities.    Return for recheck for MRI results.    Mario Smart MD

## 2017-06-21 ENCOUNTER — TELEPHONE (OUTPATIENT)
Dept: FAMILY MEDICINE CLINIC | Facility: CLINIC | Age: 67
End: 2017-06-21

## 2017-06-21 RX ORDER — CARBAMAZEPINE 100 MG/1
100 CAPSULE, EXTENDED RELEASE ORAL 2 TIMES DAILY
Qty: 60 CAPSULE | Refills: 5 | Status: SHIPPED | OUTPATIENT
Start: 2017-06-21 | End: 2017-06-28 | Stop reason: SDUPTHER

## 2017-06-21 NOTE — TELEPHONE ENCOUNTER
I don't see where this medication has been prescribed by you, nor is it on his medication list.  Please advise. Thank you.

## 2017-06-22 PROBLEM — M75.42 IMPINGEMENT SYNDROME OF LEFT SHOULDER: Status: ACTIVE | Noted: 2017-06-22

## 2017-06-22 PROBLEM — M75.100 ROTATOR CUFF SYNDROME: Status: ACTIVE | Noted: 2017-06-22

## 2017-06-22 PROBLEM — E11.9 TYPE 2 DIABETES MELLITUS WITHOUT COMPLICATION, WITHOUT LONG-TERM CURRENT USE OF INSULIN: Status: ACTIVE | Noted: 2017-06-22

## 2017-06-26 ENCOUNTER — OFFICE VISIT (OUTPATIENT)
Dept: OPHTHALMOLOGY | Facility: CLINIC | Age: 67
End: 2017-06-26

## 2017-06-26 DIAGNOSIS — H25.013 CORTICAL AGE-RELATED CATARACT, BILATERAL: ICD-10-CM

## 2017-06-26 DIAGNOSIS — E11.9 DIABETES MELLITUS WITHOUT COMPLICATION (HCC): ICD-10-CM

## 2017-06-26 DIAGNOSIS — I67.1 CAROTID-CAVERNOUS FISTULA: Primary | ICD-10-CM

## 2017-06-26 DIAGNOSIS — IMO0002 NUCLEAR CATARACT, BILATERAL: ICD-10-CM

## 2017-06-26 PROCEDURE — 99213 OFFICE O/P EST LOW 20 MIN: CPT | Performed by: OPHTHALMOLOGY

## 2017-06-26 NOTE — PROGRESS NOTES
Subjective   Beka Zavaleta is a 66 y.o. male.   Chief Complaint   Patient presents with   • Cataract   • Glaucoma         Review of Systems    Objective   Base Eye Exam     Visual Acuity (Snellen - Linear)      Right Left   Dist cc 20/50 20/25         Tonometry (Applanation, 8:19 AM)      Right Left   Pressure 19 18               Slit Lamp and Fundus Exam     External Exam      Right Left    External Normal, no proptosis Normal      Slit Lamp Exam      Right Left    Lids/Lashes Normal Normal    Conjunctiva/Sclera White and quiet, no chemosis White and quiet, scar at 10:00    Cornea Clear Clear    Anterior Chamber Deep and quiet Deep and quiet    Iris Round and reactive Round and reactive iris drawn nasal    Lens 1+ Nuclear sclerosis, 2+ Cortical cataract 1+ Nuclear sclerosis, 2+ Cortical cataract    Vitreous Normal Normal      Fundus Exam      Right Left    Disc Normal Normal    Macula Normal Normal    Vessels Normal Normal                Assessment/Plan   Diagnoses and all orders for this visit:    Carotid-cavernous fistula  Comments:  low flow OD, resolved; IOP stable off meds    Nuclear cataract, bilateral    Cortical age-related cataract, bilateral    Diabetes mellitus without complication      Plan:   discussed findings  Recommend ophthalmology f/u one year/prn

## 2017-06-28 ENCOUNTER — TELEPHONE (OUTPATIENT)
Dept: FAMILY MEDICINE CLINIC | Facility: CLINIC | Age: 67
End: 2017-06-28

## 2017-06-28 ENCOUNTER — HOSPITAL ENCOUNTER (OUTPATIENT)
Dept: MRI IMAGING | Facility: HOSPITAL | Age: 67
Discharge: HOME | End: 2017-06-28
Admitting: ORTHOPAEDIC SURGERY

## 2017-06-28 DIAGNOSIS — G89.29 CHRONIC LEFT SHOULDER PAIN: ICD-10-CM

## 2017-06-28 DIAGNOSIS — M25.512 CHRONIC LEFT SHOULDER PAIN: ICD-10-CM

## 2017-06-28 PROCEDURE — 73221 MRI JOINT UPR EXTREM W/O DYE: CPT

## 2017-06-28 RX ORDER — CARBAMAZEPINE 100 MG/1
CAPSULE, EXTENDED RELEASE ORAL
Qty: 300 CAPSULE | Refills: 5 | Status: SHIPPED | OUTPATIENT
Start: 2017-06-28 | End: 2017-07-10 | Stop reason: CLARIF

## 2017-06-28 NOTE — TELEPHONE ENCOUNTER
SONAL AMEZQUITA CALLED FOR NETTA CONN.  HE IS NEEDING A 30 DAY REFILL ON THE CARBAMAZEPINE=HE IS TAKING 10 PILLS A  DAY NOW SO WILL NEED AM'T  SENT IN

## 2017-06-28 NOTE — TELEPHONE ENCOUNTER
I have called Mr. Zavaleta and talked to him and his wife.  He states he has been taking the Carbamazepine 100 mg 4 tablets in the AM, 3 tablets at lunch and 3 tablets at HS.    For some reason this was changed in his Med List at the last visit in 5/12/17.  I have talked with Dr. Hawkins and he said to change it from 1 tablet BID to 4 AM, 3 @ lunch and 3 HS.    A new script has been sent to [facility] in [place].

## 2017-07-05 ENCOUNTER — TELEPHONE (OUTPATIENT)
Dept: FAMILY MEDICINE CLINIC | Facility: CLINIC | Age: 67
End: 2017-07-05

## 2017-07-05 RX ORDER — LISINOPRIL 40 MG/1
40 TABLET ORAL 2 TIMES DAILY
Qty: 60 TABLET | Refills: 5 | Status: SHIPPED | OUTPATIENT
Start: 2017-07-05 | End: 2017-12-28 | Stop reason: SDUPTHER

## 2017-07-05 RX ORDER — MONTELUKAST SODIUM 4 MG/1
TABLET, CHEWABLE ORAL
Qty: 360 TABLET | Refills: 3 | Status: SHIPPED | OUTPATIENT
Start: 2017-07-05 | End: 2018-07-20 | Stop reason: SDUPTHER

## 2017-07-05 NOTE — TELEPHONE ENCOUNTER
Requested Refills Sent    ---- Message from Isabella Crowell sent at 7/5/2017  3:28 PM CDT -----  Contact: [phone]  DR BRANDIN DUBOIS- CHARLES-WIFE CALLED AND SAID HE NEEDS  PRES FOR HIS COLESTIPOL SENT TO [facility] IN [place]. SHE SAID HE TAKE 2 IN AM AND  2 AT NIGHT AND PRES THAT DR GHOTRA SENT WAS ONLY FOR 60 TABLETS. HE CAN BE REACHED [phone].

## 2017-07-05 NOTE — TELEPHONE ENCOUNTER
Requested Refills Sent    ----- Message from Nannette Maguire MA sent at 7/3/2017 10:10 AM CDT -----  Regarding: FW: REFILL  Contact: [phone]      ----- Message -----     From: Indiana Thomas     Sent: 7/3/2017  10:01 AM       To: Nannette Maguire MA  Subject: REFILL                                           BRANDIN PATIENT. NEEDS REFILL ON HIS LISINOPRIL 40 MG. [facility]. PLEASE CALL PATIENT [phone]

## 2017-07-06 ENCOUNTER — CLINICAL SUPPORT (OUTPATIENT)
Dept: FAMILY MEDICINE CLINIC | Facility: CLINIC | Age: 67
End: 2017-07-06

## 2017-07-06 DIAGNOSIS — D51.0 PERNICIOUS ANEMIA: Primary | ICD-10-CM

## 2017-07-06 PROCEDURE — 96372 THER/PROPH/DIAG INJ SC/IM: CPT | Performed by: INTERNAL MEDICINE

## 2017-07-06 RX ADMIN — CYANOCOBALAMIN 1000 MCG: 1000 INJECTION, SOLUTION INTRAMUSCULAR; SUBCUTANEOUS at 10:49

## 2017-07-10 RX ORDER — CARBAMAZEPINE 100 MG/1
TABLET, EXTENDED RELEASE ORAL
Qty: 300 TABLET | Refills: 5 | Status: SHIPPED | OUTPATIENT
Start: 2017-07-10 | End: 2017-12-26 | Stop reason: SDUPTHER

## 2017-07-10 NOTE — TELEPHONE ENCOUNTER
Ms. Beka Zavaleta Called about a refill on his Tegretol 100 mg. The pharmacy can't get capsules, they need a script for tablets.  I have sent a new script for Tegretol 100 mg TABLETS.  I have called the Pharmacy and it did go through.  I have called th patient to let her know that the script for tablets did go through for a $15.00 or so co-pay.

## 2017-07-21 ENCOUNTER — OFFICE VISIT (OUTPATIENT)
Dept: ORTHOPEDIC SURGERY | Facility: CLINIC | Age: 67
End: 2017-07-21

## 2017-07-21 VITALS — WEIGHT: 218 LBS | HEIGHT: 72 IN | BODY MASS INDEX: 29.53 KG/M2

## 2017-07-21 DIAGNOSIS — G89.29 CHRONIC LEFT SHOULDER PAIN: Primary | ICD-10-CM

## 2017-07-21 DIAGNOSIS — M75.42 IMPINGEMENT SYNDROME OF LEFT SHOULDER: ICD-10-CM

## 2017-07-21 DIAGNOSIS — M75.102 ROTATOR CUFF SYNDROME, LEFT: ICD-10-CM

## 2017-07-21 DIAGNOSIS — M25.512 CHRONIC LEFT SHOULDER PAIN: Primary | ICD-10-CM

## 2017-07-21 PROCEDURE — 99214 OFFICE O/P EST MOD 30 MIN: CPT | Performed by: ORTHOPAEDIC SURGERY

## 2017-07-21 PROCEDURE — 20610 DRAIN/INJ JOINT/BURSA W/O US: CPT | Performed by: ORTHOPAEDIC SURGERY

## 2017-07-21 RX ORDER — LIDOCAINE HYDROCHLORIDE 10 MG/ML
2 INJECTION, SOLUTION INFILTRATION; PERINEURAL
Status: COMPLETED | OUTPATIENT
Start: 2017-07-21 | End: 2017-07-21

## 2017-07-21 RX ORDER — TRIAMCINOLONE ACETONIDE 40 MG/ML
40 INJECTION, SUSPENSION INTRA-ARTICULAR; INTRAMUSCULAR
Status: COMPLETED | OUTPATIENT
Start: 2017-07-21 | End: 2017-07-21

## 2017-07-21 RX ORDER — MELOXICAM 15 MG/1
TABLET ORAL
Qty: 30 TABLET | Refills: 3 | Status: SHIPPED | OUTPATIENT
Start: 2017-07-21 | End: 2018-02-21 | Stop reason: ALTCHOICE

## 2017-07-21 RX ADMIN — TRIAMCINOLONE ACETONIDE 40 MG: 40 INJECTION, SUSPENSION INTRA-ARTICULAR; INTRAMUSCULAR at 08:14

## 2017-07-21 RX ADMIN — LIDOCAINE HYDROCHLORIDE 2 ML: 10 INJECTION, SOLUTION INFILTRATION; PERINEURAL at 08:14

## 2017-07-21 NOTE — PROGRESS NOTES
Beka Zavaleta is a 66 y.o. male returns for     Chief Complaint   Patient presents with   • Left Shoulder - Follow-up   • Results     MRI       HISTORY OF PRESENT ILLNESS: Patient returns for mri results   Did not get the medicine from last time, never arrived at pharmacy.  Still with pain in both shoulders, left>right     CONCURRENT MEDICAL HISTORY:    Past Medical History:   Diagnosis Date   • Cobalamin deficiency    • Coronary atherosclerosis     post stent      • Diverticular disease of colon    • Encounter for screening for malignant neoplasm of prostate 08/13/2014   • Essential hypertension    • GERD (gastroesophageal reflux disease)    • Goiter    • History of colon polyps    • History of echocardiogram 05/10/2016    Mild dilated LV with normal LV systolic function, with LVEF of 60%.Diastolic dysfunction of the left ventricle.Moderately left atrial dilatation.Midl mitral and pulmonic valve regurg.Trivial tricuspid valve regurg.No pul.htn.[facility]   • History of Holter monitoring 05/09/2016    Rare PACs and PVCs.One run of SVT with 6 beats at 147 BPM. [facility].   • History of trigeminal neuralgia    • Hypercholesterolemia    • Hyperkalemia    • Impotence    • Malaise and fatigue    • Obstructive sleep apnea syndrome    • Plantar fasciitis    • Type 2 diabetes mellitus    • Type II diabetes mellitus, uncontrolled    • Vitamin deficiency        No Known Allergies      Current Outpatient Prescriptions:   •  amLODIPine (NORVASC) 10 MG tablet, Take 1 tablet by mouth Daily., Disp: 90 tablet, Rfl: 3  •  aspirin 81 MG tablet, Take 81 mg by mouth daily., Disp: , Rfl:   •  atenolol (TENORMIN) 100 MG tablet, Take 1 tablet by mouth Daily. REPLACES THE COMBO MED DUE TO DOSING, Disp: 90 tablet, Rfl: 3  •  atorvastatin (LIPITOR) 80 MG tablet, Take 1 tablet by mouth Daily., Disp: 90 tablet, Rfl: 3  •  carBAMazepine XR (TEGretol  XR) 100 MG 12 hr tablet, Take 4 tablets in the Morning, 3 tablets at  "Lunch and 3 tablets at Bedtime, Disp: 300 tablet, Rfl: 5  •  colestipol (COLESTID) 1 G tablet, TAKE TWO TABLETS BY MOUTH TWICE DAILY, Disp: 360 tablet, Rfl: 3  •  doxazosin (CARDURA) 4 MG tablet, Take 1 tablet by mouth Every Night., Disp: 90 tablet, Rfl: 3  •  Dulaglutide (TRULICITY) 0.75 MG/0.5ML solution pen-injector, Inject 0.75 mg under the skin 1 (One) Time Per Week., Disp: 4 pen, Rfl: 11  •  glucose blood (ACCU-CHEK ENDER) test strip, Test sugars up to 4 times daily as directed by Physician., Disp: , Rfl:   •  insulin glargine (LANTUS) 100 UNIT/ML injection, Inject 176 Units under the skin Every Night. (Dosage Increased due to being on Prednisone), Disp: 50 mL, Rfl: 5  •  Insulin Syringe-Needle U-100 30G X 7/16\" 1 ML misc, daily., Disp: , Rfl:   •  isosorbide mononitrate (IMDUR) 30 MG 24 hr tablet, Take 1 tablet by mouth Daily. Take 1/2 pill once a day, Disp: 30 tablet, Rfl: 5  •  lisinopril (PRINIVIL,ZESTRIL) 40 MG tablet, Take 1 tablet by mouth 2 (Two) Times a Day., Disp: 60 tablet, Rfl: 5  •  magnesium oxide (MAGOX) 400 (241.3 MG) MG tablet tablet, Take 400 mg by mouth Daily., Disp: , Rfl:   •  meloxicam (MOBIC) 15 MG tablet, 1 PO Daily with food., Disp: 30 tablet, Rfl: 3  •  metFORMIN (GLUCOPHAGE) 500 MG tablet, Take 1 tablet by mouth 2 (Two) Times a Day., Disp: 60 tablet, Rfl: 5  •  Multiple Vitamins-Minerals (ICAPS PO), Take  by mouth daily., Disp: , Rfl:   •  nitroglycerin (NITROSTAT) 0.4 MG SL tablet, Place 0.4 mg under the tongue as needed for chest pain. repeat X 1 in 5 min. if not relieved and then got to ER or call an ambulance, Disp: , Rfl:   •  OMEGA-3 FATTY ACIDS-VITAMIN E PO, Take  by mouth., Disp: , Rfl:   •  omeprazole (priLOSEC) 40 MG capsule, Take 1 capsule by mouth Daily., Disp: 30 capsule, Rfl: 5  •  prasugrel (EFFIENT) 10 MG tablet, Take 10 mg by mouth., Disp: , Rfl:   •  ranolazine (RANEXA) 1000 MG 12 hr tablet, Take 500 mg by mouth Daily., Disp: , Rfl:   •  RELION ULTRA THIN PLUS " "LANCETS misc, test once daily, Disp: , Rfl:   •  SITagliptin (JANUVIA) 100 MG tablet, Take 1 tablet by mouth Daily., Disp: 30 tablet, Rfl: 5  •  spironolactone (ALDACTONE) 25 MG tablet, Take 1 tablet by mouth Daily., Disp: 90 tablet, Rfl: 3    Current Facility-Administered Medications:   •  cyanocobalamin injection 1,000 mcg, 1,000 mcg, Intramuscular, Q28 Days, Sundeep Castillo MD, 1,000 mcg at 07/06/17 1049  •  meloxicam (MOBIC) tablet 15 mg, 15 mg, Oral, Daily, Mario Smart MD    Past Surgical History:   Procedure Laterality Date   • CARDIAC CATHETERIZATION  12/18/2014    Stent in LAD widely patent. Distal LAD mild 50% stenosis. Circumflex PDA about 50-70%. RCA nondominant. Circumflex dominant.   • INJECTION OF MEDICATION  07/05/2016    B12 (Cobalamin deficiency) (53)       ROS  No fevers or chills.  No chest pain or shortness of air.  No GI or  disturbances.    PHYSICAL EXAMINATION:       Ht 72\" (182.9 cm)  Wt 218 lb (98.9 kg)  BMI 29.57 kg/m2    Physical Exam   Constitutional: He is oriented to person, place, and time. He appears well-developed and well-nourished.   Neurological: He is alert and oriented to person, place, and time.   Psychiatric: He has a normal mood and affect. His behavior is normal. Judgment and thought content normal.       GAIT:     []  Normal  []  Antalgic    Assistive device: []  None  []  Walker     []  Crutches  []  Cane     []  Wheelchair  []  Stretcher    Right Shoulder Exam     Tenderness   The patient is experiencing no tenderness.        Range of Motion   Active Abduction: 120   Forward Flexion: 170   Internal Rotation 0 degrees: T10     Muscle Strength   Abduction: 5/5   Supraspinatus: 5/5     Tests   Hawkin's test: negative  Impingement: negative    Other   Erythema: present  Sensation: normal  Pulse: present    Comments:  Stable joint exam      Left Shoulder Exam     Tenderness   The patient is experiencing tenderness in the acromioclavicular joint.    Range of " Motion   Active Abduction: 120 (160° with assistance)   Forward Flexion: 160 (160° with assistance)   Internal Rotation 0 degrees: T10     Muscle Strength   Abduction: 4/5   Supraspinatus: 4/5     Tests   Cross Arm: positive  Hawkin's test: positive  Impingement: positive    Other   Erythema: absent  Sensation: normal  Pulse: present     Comments:  Stable joint exam              Mri Shoulder Left Wo Contrast    Result Date: 6/28/2017  Narrative: MRI left shoulder. HISTORY: Left shoulder chronic pain. Prior exam: Left shoulder x-ray June 20, 2017. Unavailable FINDINGS: Reversed Bankart lesion  posterior aspect of glenoid, (osteochondral injury consisting of avulsion of the posterior labrum and posterior capsular periosteum. This is most apparent on series 3 image 11.) There is fluid within the subacromial and subdeltoid bursa bursa, bursitis. There is a acromial clavicular  joint arthrosis consisting of a distal clavicular spur projecting inferiorly and an inferior acromial spur. These findings cause mild underlying impingement upon the bursal surface of the supraspinatous tendon. Diffuse thickening and signal heterogeneity involving most of the supraspinatous and portions of the infraspinatus tendons suggesting tendinosis and/or subtle areas of interstitial tear. Small high-grade partial-thickness bursal surface tear involving the most anterior aspect of the supraspinous tendon series 8 image five. The size of this defect 0.44 cm. Some thickening and heterogeneity of the supraspinatous of the subscapularis tendon suggesting tendinosis. The biceps tendon is intact and normal in position within the intertubercular  sulcus. There is an atypical  fluid collection anterior to the subscapularis musculotendinous junction possibly a ganglion or subscapularis bursitis.     Impression: CONCLUSION: Reversed Bankart lesion posterior aspect of the glenoid. Acromioclavicular joint arthrosis causing mild underlying impingement.  Diffuse thickening and heterogeneity portions of the supraspinatous and infraspinous tendon suggesting tendinosis and/or subtle areas of interstitial partial-thickness tear. A small high-grade partial-thickness surface bursal surface tear distal and anterior aspect supraspinous tendon 0.44 cm in size. Small amount of fluid in the subacromial subdeltoid bursa, bursitis. A larger oval-shaped fluid collection immediately  anterior to the subscapularis musculotendinous junction possible ganglion or subscapularis bursitis. Electronically signed by:  Moustapha Lara MD  6/28/2017 5:24 PM CDT Workstation: [id number]            ASSESSMENT:    Diagnoses and all orders for this visit:    Chronic left shoulder pain  -     Ambulatory Referral to Physical Therapy Evaluate and treat    Impingement syndrome of left shoulder  -     Ambulatory Referral to Physical Therapy Evaluate and treat    Rotator cuff syndrome, left  -     Ambulatory Referral to Physical Therapy Evaluate and treat    Other orders  -     meloxicam (MOBIC) 15 MG tablet; 1 PO Daily with food.  -     Large Joint Arthrocentesis      Large Joint Arthrocentesis  Date/Time: 7/21/2017 8:14 AM  Consent given by: patient  Site marked: site marked  Timeout: Immediately prior to procedure a time out was called to verify the correct patient, procedure, equipment, support staff and site/side marked as required   Supporting Documentation  Indications: pain   Procedure Details  Location: shoulder - L subacromial bursa  Preparation: Patient was prepped and draped in the usual sterile fashion  Needle size: 22 G  Approach: posterior  Medications administered: 40 mg triamcinolone acetonide 40 MG/ML; 2 mL lidocaine 1 %  Patient tolerance: patient tolerated the procedure well with no immediate complications            PLAN    He'll begin physical therapy for range of motion strengthening and home exercise program.  He will continue with the home exercise program.  We discussed starting  meloxicam and the medicine was called and this time for sure.  Injection into the left shoulder for the subacromial space.  We'll see how he responds and discussed possible injection to the right shoulder at next visit.  Return in about 4 weeks (around 8/18/2017) for recheck.    Mario Smart MD

## 2017-08-01 ENCOUNTER — CLINICAL SUPPORT (OUTPATIENT)
Dept: FAMILY MEDICINE CLINIC | Facility: CLINIC | Age: 67
End: 2017-08-01

## 2017-08-01 DIAGNOSIS — D51.0 PERNICIOUS ANEMIA: Primary | ICD-10-CM

## 2017-08-01 PROCEDURE — 96372 THER/PROPH/DIAG INJ SC/IM: CPT | Performed by: INTERNAL MEDICINE

## 2017-08-01 RX ADMIN — CYANOCOBALAMIN 1000 MCG: 1000 INJECTION, SOLUTION INTRAMUSCULAR; SUBCUTANEOUS at 09:30

## 2017-08-11 ENCOUNTER — OFFICE VISIT (OUTPATIENT)
Dept: FAMILY MEDICINE CLINIC | Facility: CLINIC | Age: 67
End: 2017-08-11

## 2017-08-11 VITALS
HEART RATE: 68 BPM | WEIGHT: 215.9 LBS | OXYGEN SATURATION: 99 % | HEIGHT: 72 IN | BODY MASS INDEX: 29.24 KG/M2 | SYSTOLIC BLOOD PRESSURE: 122 MMHG | DIASTOLIC BLOOD PRESSURE: 70 MMHG

## 2017-08-11 DIAGNOSIS — I25.10 ATHEROSCLEROSIS OF NATIVE CORONARY ARTERY OF NATIVE HEART WITHOUT ANGINA PECTORIS: ICD-10-CM

## 2017-08-11 DIAGNOSIS — E11.65 UNCONTROLLED TYPE 2 DIABETES MELLITUS WITH HYPERGLYCEMIA, WITH LONG-TERM CURRENT USE OF INSULIN (HCC): ICD-10-CM

## 2017-08-11 DIAGNOSIS — Z79.4 UNCONTROLLED TYPE 2 DIABETES MELLITUS WITH HYPERGLYCEMIA, WITH LONG-TERM CURRENT USE OF INSULIN (HCC): ICD-10-CM

## 2017-08-11 DIAGNOSIS — I10 ESSENTIAL HYPERTENSION: Primary | ICD-10-CM

## 2017-08-11 PROCEDURE — 99214 OFFICE O/P EST MOD 30 MIN: CPT | Performed by: FAMILY MEDICINE

## 2017-08-11 NOTE — PROGRESS NOTES
Subjective   Beka Zavaleta is a 66 y.o. male.     Hypertension   This is a chronic problem. The current episode started more than 1 year ago. The problem is unchanged. The problem is controlled. Pertinent negatives include no chest pain, orthopnea, palpitations, peripheral edema, PND or shortness of breath.   Diabetes   He presents for his follow-up diabetic visit. He has type 2 diabetes mellitus. His disease course has been fluctuating. Pertinent negatives for hypoglycemia include no confusion or hunger. Associated symptoms include foot paresthesias. Pertinent negatives for diabetes include no chest pain, no foot ulcerations, no polydipsia, no polyuria and no weight loss. His breakfast blood glucose is taken between 7-8 am. His breakfast blood glucose range is generally 130-140 mg/dl. His highest blood glucose is >200 mg/dl. () An ACE inhibitor/angiotensin II receptor blocker is being taken. Eye exam is current.   Coronary Artery Disease   Presents for follow-up visit. Pertinent negatives include no chest pain, chest pressure, chest tightness, leg swelling, palpitations or shortness of breath.   Heartburn   He complains of nausea. He reports no abdominal pain, no chest pain, no dysphagia or no heartburn. Pertinent negatives include no weight loss.   Diarrhea    This is a recurrent problem. The current episode started more than 1 month ago. The problem occurs 5 to 10 times per day. The problem has been waxing and waning. The stool consistency is described as watery. Pertinent negatives include no abdominal pain, chills, fever or weight loss.        The following portions of the patient's history were reviewed and updated as appropriate: allergies, current medications, past family history, past medical history, past social history, past surgical history and problem list.    Review of Systems   Constitutional: Negative for chills, fever and weight loss.   Respiratory: Negative for chest tightness and shortness  of breath.    Cardiovascular: Negative for chest pain, palpitations, orthopnea, leg swelling and PND.   Gastrointestinal: Positive for diarrhea and nausea. Negative for abdominal pain, dysphagia and heartburn.   Endocrine: Negative for polydipsia and polyuria.   Psychiatric/Behavioral: Negative for confusion.       Objective   Physical Exam   Constitutional: He is oriented to person, place, and time. He appears well-developed and well-nourished. No distress.   HENT:   Head: Normocephalic and atraumatic.   Cardiovascular: Normal rate, regular rhythm, normal heart sounds and intact distal pulses.  Exam reveals no gallop and no friction rub.    No murmur heard.  Pulmonary/Chest: Effort normal and breath sounds normal. No respiratory distress. He has no wheezes. He has no rales. He exhibits no tenderness.   Abdominal: Soft. Normal appearance. There is no tenderness.   Musculoskeletal: He exhibits no edema.    Beka had a diabetic foot exam performed (callus noted) today.   During the foot exam he had a monofilament test performed (normal).    Vascular Status -  His exam exhibits right foot vasculature normal. His exam exhibits no right foot edema. His exam exhibits left foot vasculature normal. His exam exhibits no left foot edema.  Neurological: He is alert and oriented to person, place, and time.   Skin: Skin is warm and dry. He is not diaphoretic.   Psychiatric: He has a normal mood and affect. His behavior is normal. Judgment and thought content normal.   Nursing note and vitals reviewed.      Assessment/Plan   Problems Addressed this Visit        Cardiovascular and Mediastinum    Essential hypertension - Primary    Coronary atherosclerosis       Endocrine    Type II diabetes mellitus, uncontrolled

## 2017-08-17 RX ORDER — OMEPRAZOLE 40 MG/1
40 CAPSULE, DELAYED RELEASE ORAL DAILY
Qty: 30 CAPSULE | Refills: 5 | Status: SHIPPED | OUTPATIENT
Start: 2017-08-17 | End: 2018-04-16 | Stop reason: SDUPTHER

## 2017-08-18 ENCOUNTER — TELEPHONE (OUTPATIENT)
Dept: FAMILY MEDICINE CLINIC | Facility: CLINIC | Age: 67
End: 2017-08-18

## 2017-08-18 NOTE — TELEPHONE ENCOUNTER
Can not get Atenolol 100 mg, they have Atenolol 50 mg, they want to give that and have him take 2 until the other strengths are available  The OK was given.  They will instruct the patient with reason behind the change.       ----- Message from Indiana Thomas sent at 8/18/2017  8:21 AM CDT -----  Regarding: RX  Contact: [phone]  SON[facility] HAS CALLED AND IS NEEDING TO VERIFY A RX SENT IN FOR HIS ATENOLOL. PLEASE CALL [facility] [phone]

## 2017-08-22 ENCOUNTER — OFFICE VISIT (OUTPATIENT)
Dept: ORTHOPEDIC SURGERY | Facility: CLINIC | Age: 67
End: 2017-08-22

## 2017-08-22 VITALS — WEIGHT: 215 LBS | BODY MASS INDEX: 29.12 KG/M2 | HEIGHT: 72 IN

## 2017-08-22 DIAGNOSIS — M75.42 IMPINGEMENT SYNDROME OF LEFT SHOULDER: ICD-10-CM

## 2017-08-22 DIAGNOSIS — I10 ESSENTIAL HYPERTENSION: ICD-10-CM

## 2017-08-22 DIAGNOSIS — M75.102 ROTATOR CUFF SYNDROME, LEFT: Primary | ICD-10-CM

## 2017-08-22 PROCEDURE — 99213 OFFICE O/P EST LOW 20 MIN: CPT | Performed by: ORTHOPAEDIC SURGERY

## 2017-08-22 NOTE — PROGRESS NOTES
Beka Zavaleta is a 66 y.o. male returns for     Chief Complaint   Patient presents with   • Left Shoulder - Follow-up       HISTORY OF PRESENT ILLNESS: Patients shoulder is doing well. He received a injection at last visit and is currently in PT  He is sleeping well at night.  No fevers or chills.       CONCURRENT MEDICAL HISTORY:    Past Medical History:   Diagnosis Date   • Cobalamin deficiency    • Coronary atherosclerosis     post stent      • Diverticular disease of colon    • Encounter for screening for malignant neoplasm of prostate 08/13/2014   • Essential hypertension    • GERD (gastroesophageal reflux disease)    • Goiter    • History of colon polyps    • History of echocardiogram 05/10/2016    Mild dilated LV with normal LV systolic function, with LVEF of 60%.Diastolic dysfunction of the left ventricle.Moderately left atrial dilatation.Midl mitral and pulmonic valve regurg.Trivial tricuspid valve regurg.No pul.htn.[facility]   • History of Holter monitoring 05/09/2016    Rare PACs and PVCs.One run of SVT with 6 beats at 147 BPM. [facility].   • History of trigeminal neuralgia    • Hypercholesterolemia    • Hyperkalemia    • Impotence    • Malaise and fatigue    • Obstructive sleep apnea syndrome    • Plantar fasciitis    • Type 2 diabetes mellitus    • Type II diabetes mellitus, uncontrolled    • Vitamin deficiency        No Known Allergies      Current Outpatient Prescriptions:   •  amLODIPine (NORVASC) 10 MG tablet, Take 1 tablet by mouth Daily., Disp: 90 tablet, Rfl: 3  •  aspirin 81 MG tablet, Take 81 mg by mouth daily., Disp: , Rfl:   •  atenolol (TENORMIN) 100 MG tablet, Take 1 tablet by mouth Daily. REPLACES THE COMBO MED DUE TO DOSING, Disp: 90 tablet, Rfl: 3  •  atorvastatin (LIPITOR) 80 MG tablet, Take 1 tablet by mouth Daily., Disp: 90 tablet, Rfl: 3  •  carBAMazepine XR (TEGretol  XR) 100 MG 12 hr tablet, Take 4 tablets in the Morning, 3 tablets at Lunch and 3 tablets at  "Bedtime, Disp: 300 tablet, Rfl: 5  •  colestipol (COLESTID) 1 G tablet, TAKE TWO TABLETS BY MOUTH TWICE DAILY, Disp: 360 tablet, Rfl: 3  •  doxazosin (CARDURA) 4 MG tablet, Take 1 tablet by mouth Every Night., Disp: 90 tablet, Rfl: 3  •  Dulaglutide (TRULICITY) 0.75 MG/0.5ML solution pen-injector, Inject 0.75 mg under the skin 1 (One) Time Per Week., Disp: 4 pen, Rfl: 11  •  glucose blood (ACCU-CHEK ENDER) test strip, Test sugars up to 4 times daily as directed by Physician., Disp: , Rfl:   •  insulin glargine (LANTUS) 100 UNIT/ML injection, Inject 176 Units under the skin Every Night. (Dosage Increased due to being on Prednisone), Disp: 50 mL, Rfl: 5  •  Insulin Syringe-Needle U-100 30G X 7/16\" 1 ML misc, daily., Disp: , Rfl:   •  isosorbide mononitrate (IMDUR) 30 MG 24 hr tablet, Take 1 tablet by mouth Daily. Take 1/2 pill once a day, Disp: 30 tablet, Rfl: 5  •  lisinopril (PRINIVIL,ZESTRIL) 40 MG tablet, Take 1 tablet by mouth 2 (Two) Times a Day., Disp: 60 tablet, Rfl: 5  •  magnesium oxide (MAGOX) 400 (241.3 MG) MG tablet tablet, Take 400 mg by mouth Daily., Disp: , Rfl:   •  meloxicam (MOBIC) 15 MG tablet, 1 PO Daily with food., Disp: 30 tablet, Rfl: 3  •  metFORMIN (GLUCOPHAGE) 500 MG tablet, Take 1 tablet by mouth 2 (Two) Times a Day., Disp: 60 tablet, Rfl: 5  •  Multiple Vitamins-Minerals (ICAPS PO), Take  by mouth daily., Disp: , Rfl:   •  nitroglycerin (NITROSTAT) 0.4 MG SL tablet, Place 0.4 mg under the tongue as needed for chest pain. repeat X 1 in 5 min. if not relieved and then got to ER or call an ambulance, Disp: , Rfl:   •  OMEGA-3 FATTY ACIDS-VITAMIN E PO, Take  by mouth., Disp: , Rfl:   •  omeprazole (priLOSEC) 40 MG capsule, Take 1 capsule by mouth Daily., Disp: 30 capsule, Rfl: 5  •  prasugrel (EFFIENT) 10 MG tablet, Take 10 mg by mouth., Disp: , Rfl:   •  ranolazine (RANEXA) 1000 MG 12 hr tablet, Take 500 mg by mouth Daily., Disp: , Rfl:   •  RELION ULTRA THIN PLUS LANCETS misc, test once " "daily, Disp: , Rfl:   •  SITagliptin (JANUVIA) 100 MG tablet, Take 1 tablet by mouth Daily., Disp: 30 tablet, Rfl: 5  •  spironolactone (ALDACTONE) 25 MG tablet, Take 1 tablet by mouth Daily., Disp: 90 tablet, Rfl: 3    Current Facility-Administered Medications:   •  cyanocobalamin injection 1,000 mcg, 1,000 mcg, Intramuscular, Q28 Days, Sundeep Castillo MD, 1,000 mcg at 08/01/17 0930  •  meloxicam (MOBIC) tablet 15 mg, 15 mg, Oral, Daily, Mario Smart MD    Past Surgical History:   Procedure Laterality Date   • CARDIAC CATHETERIZATION  12/18/2014    Stent in LAD widely patent. Distal LAD mild 50% stenosis. Circumflex PDA about 50-70%. RCA nondominant. Circumflex dominant.   • INJECTION OF MEDICATION  07/05/2016    B12 (Cobalamin deficiency) (55)       ROS  No fevers or chills.  No chest pain or shortness of air.  No GI or  disturbances.    PHYSICAL EXAMINATION:       Ht 72\" (182.9 cm)  Wt 215 lb (97.5 kg)  BMI 29.16 kg/m2    Physical Exam   Constitutional: He is oriented to person, place, and time. He appears well-developed and well-nourished.   Neurological: He is alert and oriented to person, place, and time.   Psychiatric: He has a normal mood and affect. His behavior is normal. Judgment and thought content normal.         Left Shoulder Exam     Tenderness   The patient is experiencing no tenderness.         Range of Motion   Active Abduction: 160   Forward Flexion: 170     Muscle Strength   Abduction: 4/5   Supraspinatus: 4/5     Tests   Hawkin's test: positive    Other   Erythema: absent  Sensation: normal  Pulse: present                       ASSESSMENT:    Diagnoses and all orders for this visit:    Rotator cuff syndrome, left    Essential hypertension    Impingement syndrome of left shoulder          PLAN    Continue home exercises.  Continue activity as tolerated.  Continue strengthening and conditioning exercises involving the left shoulder.  Possible repeat shoulder injection.    Return if " symptoms worsen or fail to improve, for recheck.    Mario Smart MD

## 2017-08-31 ENCOUNTER — TELEPHONE (OUTPATIENT)
Dept: FAMILY MEDICINE CLINIC | Facility: CLINIC | Age: 67
End: 2017-08-31

## 2017-09-01 ENCOUNTER — CLINICAL SUPPORT (OUTPATIENT)
Dept: FAMILY MEDICINE CLINIC | Facility: CLINIC | Age: 67
End: 2017-09-01

## 2017-09-01 DIAGNOSIS — D51.0 PERNICIOUS ANEMIA: Primary | ICD-10-CM

## 2017-09-01 PROCEDURE — 96372 THER/PROPH/DIAG INJ SC/IM: CPT | Performed by: INTERNAL MEDICINE

## 2017-09-01 RX ADMIN — CYANOCOBALAMIN 1000 MCG: 1000 INJECTION, SOLUTION INTRAMUSCULAR; SUBCUTANEOUS at 11:06

## 2017-10-02 ENCOUNTER — CLINICAL SUPPORT (OUTPATIENT)
Dept: FAMILY MEDICINE CLINIC | Facility: CLINIC | Age: 67
End: 2017-10-02

## 2017-10-02 DIAGNOSIS — E53.8 COBALAMIN DEFICIENCY: Primary | ICD-10-CM

## 2017-10-02 PROCEDURE — 96372 THER/PROPH/DIAG INJ SC/IM: CPT | Performed by: INTERNAL MEDICINE

## 2017-10-09 ENCOUNTER — TELEPHONE (OUTPATIENT)
Dept: FAMILY MEDICINE CLINIC | Facility: CLINIC | Age: 67
End: 2017-10-09

## 2017-10-09 DIAGNOSIS — E11.9 TYPE 2 DIABETES MELLITUS WITHOUT COMPLICATION, UNSPECIFIED LONG TERM INSULIN USE STATUS: ICD-10-CM

## 2017-10-09 RX ORDER — INSULIN GLARGINE 100 [IU]/ML
176 INJECTION, SOLUTION SUBCUTANEOUS NIGHTLY
Qty: 50 ML | Refills: 5 | Status: SHIPPED | OUTPATIENT
Start: 2017-10-09 | End: 2018-01-09

## 2017-10-09 NOTE — TELEPHONE ENCOUNTER
Requested Refills Sent    ----- Message from Desiree Garcia sent at 10/9/2017  9:11 AM CDT -----  Contact: SPOUSE  Needs refill for Lantus to [facility] in [place] [address]    [phone]

## 2017-10-24 RX ADMIN — CYANOCOBALAMIN 1000 MCG: 1000 INJECTION, SOLUTION INTRAMUSCULAR; SUBCUTANEOUS at 08:25

## 2017-11-01 ENCOUNTER — CLINICAL SUPPORT (OUTPATIENT)
Dept: FAMILY MEDICINE CLINIC | Facility: CLINIC | Age: 67
End: 2017-11-01

## 2017-11-01 ENCOUNTER — TELEPHONE (OUTPATIENT)
Dept: FAMILY MEDICINE CLINIC | Facility: CLINIC | Age: 67
End: 2017-11-01

## 2017-11-01 DIAGNOSIS — D51.0 PERNICIOUS ANEMIA: Primary | ICD-10-CM

## 2017-11-01 PROCEDURE — 96372 THER/PROPH/DIAG INJ SC/IM: CPT | Performed by: INTERNAL MEDICINE

## 2017-11-01 NOTE — TELEPHONE ENCOUNTER
WIFE HAS CALLED AND SAID [facility] IS STATING THEY DID NOT RECEIVE THE RX FOR THE SITagliptin (JANUVIA. PLEASE RESEND TO [facility]. CALL WIFE [phone]

## 2017-11-06 ENCOUNTER — TELEPHONE (OUTPATIENT)
Dept: FAMILY MEDICINE CLINIC | Facility: CLINIC | Age: 67
End: 2017-11-06

## 2017-11-06 RX ORDER — ATORVASTATIN CALCIUM 80 MG/1
80 TABLET, FILM COATED ORAL DAILY
Qty: 90 TABLET | Refills: 3 | Status: SHIPPED | OUTPATIENT
Start: 2017-11-06 | End: 2018-11-07 | Stop reason: SDUPTHER

## 2017-11-10 ENCOUNTER — TELEPHONE (OUTPATIENT)
Dept: FAMILY MEDICINE CLINIC | Facility: CLINIC | Age: 67
End: 2017-11-10

## 2017-11-10 ENCOUNTER — LAB (OUTPATIENT)
Dept: LAB | Facility: HOSPITAL | Age: 67
End: 2017-11-10

## 2017-11-10 ENCOUNTER — OFFICE VISIT (OUTPATIENT)
Dept: FAMILY MEDICINE CLINIC | Facility: CLINIC | Age: 67
End: 2017-11-10

## 2017-11-10 VITALS
WEIGHT: 220.4 LBS | DIASTOLIC BLOOD PRESSURE: 68 MMHG | HEIGHT: 72 IN | BODY MASS INDEX: 29.85 KG/M2 | OXYGEN SATURATION: 100 % | SYSTOLIC BLOOD PRESSURE: 110 MMHG | HEART RATE: 70 BPM

## 2017-11-10 DIAGNOSIS — Z12.5 SCREENING FOR PROSTATE CANCER: ICD-10-CM

## 2017-11-10 DIAGNOSIS — E11.65 UNCONTROLLED TYPE 2 DIABETES MELLITUS WITH HYPERGLYCEMIA, WITH LONG-TERM CURRENT USE OF INSULIN (HCC): ICD-10-CM

## 2017-11-10 DIAGNOSIS — I10 ESSENTIAL HYPERTENSION: ICD-10-CM

## 2017-11-10 DIAGNOSIS — Z79.4 UNCONTROLLED TYPE 2 DIABETES MELLITUS WITH HYPERGLYCEMIA, WITH LONG-TERM CURRENT USE OF INSULIN (HCC): ICD-10-CM

## 2017-11-10 DIAGNOSIS — E78.00 HYPERCHOLESTEROLEMIA: Primary | ICD-10-CM

## 2017-11-10 DIAGNOSIS — K21.9 GASTROESOPHAGEAL REFLUX DISEASE WITHOUT ESOPHAGITIS: ICD-10-CM

## 2017-11-10 DIAGNOSIS — I25.10 ATHEROSCLEROSIS OF NATIVE CORONARY ARTERY OF NATIVE HEART WITHOUT ANGINA PECTORIS: ICD-10-CM

## 2017-11-10 LAB
ALBUMIN SERPL-MCNC: 4.4 G/DL (ref 3.4–4.8)
ALBUMIN UR-MCNC: 0.6 MG/L
ALBUMIN/GLOB SERPL: 1.4 G/DL (ref 1.1–1.8)
ALP SERPL-CCNC: 51 U/L (ref 38–126)
ALT SERPL W P-5'-P-CCNC: 41 U/L (ref 21–72)
ANION GAP SERPL CALCULATED.3IONS-SCNC: 12 MMOL/L (ref 5–15)
ARTICHOKE IGE QN: 66 MG/DL (ref 1–129)
AST SERPL-CCNC: 76 U/L (ref 17–59)
BILIRUB SERPL-MCNC: 0.4 MG/DL (ref 0.2–1.3)
BUN BLD-MCNC: 17 MG/DL (ref 7–21)
BUN/CREAT SERPL: 17.5 (ref 7–25)
CALCIUM SPEC-SCNC: 9.6 MG/DL (ref 8.4–10.2)
CHLORIDE SERPL-SCNC: 101 MMOL/L (ref 95–110)
CHOLEST SERPL-MCNC: 129 MG/DL (ref 0–199)
CO2 SERPL-SCNC: 28 MMOL/L (ref 22–31)
CREAT BLD-MCNC: 0.97 MG/DL (ref 0.7–1.3)
CREAT UR-MCNC: 118.9 MG/DL
GFR SERPL CREATININE-BSD FRML MDRD: 77 ML/MIN/1.73 (ref 49–113)
GLOBULIN UR ELPH-MCNC: 3.1 GM/DL (ref 2.3–3.5)
GLUCOSE BLD-MCNC: 62 MG/DL (ref 60–100)
HBA1C MFR BLD: 6.5 % (ref 4–5.6)
HDLC SERPL-MCNC: 39 MG/DL (ref 60–200)
LDLC/HDLC SERPL: 1.51 {RATIO} (ref 0–3.55)
MAGNESIUM SERPL-MCNC: 1.9 MG/DL (ref 1.6–2.3)
MICROALBUMIN/CREAT UR: 5 MG/G (ref 0–30)
POTASSIUM BLD-SCNC: 4.7 MMOL/L (ref 3.5–5.1)
PROT SERPL-MCNC: 7.5 G/DL (ref 6.3–8.6)
PSA SERPL-MCNC: 1.16 NG/ML (ref 0–4)
SODIUM BLD-SCNC: 141 MMOL/L (ref 137–145)
TRIGL SERPL-MCNC: 155 MG/DL (ref 20–199)
VIT B12 BLD-MCNC: 396 PG/ML (ref 239–931)

## 2017-11-10 PROCEDURE — 83036 HEMOGLOBIN GLYCOSYLATED A1C: CPT | Performed by: FAMILY MEDICINE

## 2017-11-10 PROCEDURE — 83735 ASSAY OF MAGNESIUM: CPT | Performed by: FAMILY MEDICINE

## 2017-11-10 PROCEDURE — 36415 COLL VENOUS BLD VENIPUNCTURE: CPT | Performed by: FAMILY MEDICINE

## 2017-11-10 PROCEDURE — 80053 COMPREHEN METABOLIC PANEL: CPT | Performed by: FAMILY MEDICINE

## 2017-11-10 PROCEDURE — 99214 OFFICE O/P EST MOD 30 MIN: CPT | Performed by: FAMILY MEDICINE

## 2017-11-10 PROCEDURE — 82043 UR ALBUMIN QUANTITATIVE: CPT | Performed by: FAMILY MEDICINE

## 2017-11-10 PROCEDURE — 82607 VITAMIN B-12: CPT | Performed by: FAMILY MEDICINE

## 2017-11-10 PROCEDURE — G0103 PSA SCREENING: HCPCS | Performed by: FAMILY MEDICINE

## 2017-11-10 PROCEDURE — 80061 LIPID PANEL: CPT | Performed by: FAMILY MEDICINE

## 2017-11-10 PROCEDURE — 82570 ASSAY OF URINE CREATININE: CPT | Performed by: FAMILY MEDICINE

## 2017-11-10 NOTE — PROGRESS NOTES
Subjective   Beka Zavaleta is a 66 y.o. male.     Hypertension   This is a chronic problem. The current episode started more than 1 year ago. The problem is unchanged. The problem is controlled. Pertinent negatives include no chest pain, palpitations, peripheral edema or shortness of breath.   Diabetes   He presents for his follow-up diabetic visit. He has type 2 diabetes mellitus. His disease course has been fluctuating. Pertinent negatives for hypoglycemia include no confusion or hunger. Pertinent negatives for diabetes include no chest pain, no foot paresthesias, no foot ulcerations, no polydipsia and no polyuria. His breakfast blood glucose is taken between 7-8 am. His breakfast blood glucose range is generally 130-140 mg/dl. His highest blood glucose is >200 mg/dl. (Hypoglycemia 2x/week  ) An ACE inhibitor/angiotensin II receptor blocker is being taken. Eye exam is current.   Coronary Artery Disease   Presents for follow-up visit. Pertinent negatives include no chest pain, chest pressure, chest tightness, leg swelling, palpitations or shortness of breath.   Heartburn   He complains of heartburn. He reports no chest pain or no dysphagia.        The following portions of the patient's history were reviewed and updated as appropriate: allergies, current medications, past family history, past medical history, past social history, past surgical history and problem list.    Review of Systems   Respiratory: Negative for chest tightness and shortness of breath.    Cardiovascular: Negative for chest pain, palpitations and leg swelling.   Gastrointestinal: Positive for heartburn. Negative for dysphagia.   Endocrine: Negative for polydipsia and polyuria.   Psychiatric/Behavioral: Negative for confusion.       Objective   Physical Exam   Constitutional: He is oriented to person, place, and time. He appears well-developed and well-nourished. No distress.   HENT:   Head: Normocephalic and atraumatic.   Cardiovascular:  Normal rate, regular rhythm, normal heart sounds and intact distal pulses.  Exam reveals no gallop and no friction rub.    No murmur heard.  Pulmonary/Chest: Effort normal and breath sounds normal. No respiratory distress. He has no wheezes. He has no rales. He exhibits no tenderness.   Abdominal: Soft. Normal appearance. There is no tenderness.   Musculoskeletal: He exhibits no edema.    Bkea had a diabetic foot exam performed (callus noted) today.   During the foot exam he had a monofilament test performed (normal).    Vascular Status -  His exam exhibits right foot vasculature normal. His exam exhibits no right foot edema. His exam exhibits left foot vasculature normal. His exam exhibits no left foot edema.  Neurological: He is alert and oriented to person, place, and time.   Skin: Skin is warm and dry. He is not diaphoretic.   Psychiatric: He has a normal mood and affect. His behavior is normal. Judgment and thought content normal.   Nursing note and vitals reviewed.      Assessment/Plan   Problems Addressed this Visit        Cardiovascular and Mediastinum    Hypercholesterolemia - Primary    Relevant Orders    Lipid Panel    Essential hypertension    Relevant Orders    Comprehensive Metabolic Panel       Endocrine    Type II diabetes mellitus, uncontrolled    Relevant Orders    Hemoglobin A1c    Microalbumin / Creatinine Urine Ratio - Urine, Clean Catch      Other Visit Diagnoses     Atherosclerosis of native coronary artery of native heart without angina pectoris        Gastroesophageal reflux disease without esophagitis        Relevant Orders    Vitamin B12    Magnesium    Screening for prostate cancer        Relevant Orders    PSA Screen

## 2017-11-10 NOTE — TELEPHONE ENCOUNTER
Pr Dr. Hawkins, Mr. Zavaleta has been called with his recent lab results & recommendations.  Continue his current medications and follow-up as planned or sooner if any problems.      ----- Message from Fede Hawkins MD sent at 11/10/2017 11:42 AM CST -----  Ok, call or send card.

## 2017-11-10 NOTE — PROGRESS NOTES
Pr Dr. Hawkins,  Waqar has been called with his recent lab results & recommendations.  Continue his current medications and follow-up as planned or sooner if any problems.

## 2017-11-14 ENCOUNTER — TELEPHONE (OUTPATIENT)
Dept: FAMILY MEDICINE CLINIC | Facility: CLINIC | Age: 67
End: 2017-11-14

## 2017-11-14 NOTE — TELEPHONE ENCOUNTER
Pr Dr. Hawkins, Mr. Zavaleta has been called with his recent lab results & recommendations.  Continue his current medications and follow-up as planned or sooner if any problems.      ----- Message from Fede Hawkins MD sent at 11/10/2017  2:02 PM CST -----  Ok, call or send card.

## 2017-11-14 NOTE — TELEPHONE ENCOUNTER
Pr Dr. Hawkins, Mr. Zavaleta has been called with his recent lab results & recommendations.  Continue his current medications and follow-up as planned or sooner if any problems.      ----- Message from Fede Hawkins MD sent at 11/10/2017  2:05 PM CST -----  Ok, call or send card.

## 2017-11-16 RX ADMIN — CYANOCOBALAMIN 1000 MCG: 1000 INJECTION, SOLUTION INTRAMUSCULAR; SUBCUTANEOUS at 09:19

## 2017-12-01 ENCOUNTER — CLINICAL SUPPORT (OUTPATIENT)
Dept: FAMILY MEDICINE CLINIC | Facility: CLINIC | Age: 67
End: 2017-12-01

## 2017-12-01 DIAGNOSIS — E53.8 COBALAMIN DEFICIENCY: ICD-10-CM

## 2017-12-01 PROCEDURE — 96372 THER/PROPH/DIAG INJ SC/IM: CPT | Performed by: INTERNAL MEDICINE

## 2017-12-01 RX ADMIN — CYANOCOBALAMIN 1000 MCG: 1000 INJECTION, SOLUTION INTRAMUSCULAR; SUBCUTANEOUS at 12:11

## 2017-12-26 ENCOUNTER — TELEPHONE (OUTPATIENT)
Dept: FAMILY MEDICINE CLINIC | Facility: CLINIC | Age: 67
End: 2017-12-26

## 2017-12-26 RX ORDER — AMLODIPINE BESYLATE 10 MG/1
10 TABLET ORAL DAILY
Qty: 90 TABLET | Refills: 3 | Status: SHIPPED | OUTPATIENT
Start: 2017-12-26 | End: 2018-11-07 | Stop reason: SDUPTHER

## 2017-12-26 RX ORDER — CARBAMAZEPINE 100 MG/1
TABLET, EXTENDED RELEASE ORAL
Qty: 300 TABLET | Refills: 5 | Status: SHIPPED | OUTPATIENT
Start: 2017-12-26 | End: 2018-07-12 | Stop reason: SDUPTHER

## 2017-12-28 ENCOUNTER — TELEPHONE (OUTPATIENT)
Dept: FAMILY MEDICINE CLINIC | Facility: CLINIC | Age: 67
End: 2017-12-28

## 2017-12-28 RX ORDER — LISINOPRIL 40 MG/1
40 TABLET ORAL 2 TIMES DAILY
Qty: 60 TABLET | Refills: 5 | Status: SHIPPED | OUTPATIENT
Start: 2017-12-28 | End: 2018-07-03 | Stop reason: SDUPTHER

## 2017-12-28 RX ORDER — LISINOPRIL 40 MG/1
40 TABLET ORAL DAILY
Qty: 60 TABLET | Refills: 5 | Status: SHIPPED | OUTPATIENT
Start: 2017-12-28 | End: 2017-12-28 | Stop reason: SDUPTHER

## 2017-12-28 NOTE — TELEPHONE ENCOUNTER
PATIENT STATED HIS LISINOPRIL WAS CALLED IN WRONG. HE TAKES ONE TABLET TWICE A DAY. [facility]. PLEASE CALL HIM [phone]

## 2018-01-02 ENCOUNTER — CLINICAL SUPPORT (OUTPATIENT)
Dept: FAMILY MEDICINE CLINIC | Facility: CLINIC | Age: 68
End: 2018-01-02

## 2018-01-02 DIAGNOSIS — D51.0 PERNICIOUS ANEMIA: Primary | ICD-10-CM

## 2018-01-02 PROCEDURE — 96372 THER/PROPH/DIAG INJ SC/IM: CPT | Performed by: INTERNAL MEDICINE

## 2018-01-04 RX ADMIN — CYANOCOBALAMIN 1000 MCG: 1000 INJECTION, SOLUTION INTRAMUSCULAR; SUBCUTANEOUS at 10:51

## 2018-01-09 ENCOUNTER — OFFICE VISIT (OUTPATIENT)
Dept: FAMILY MEDICINE CLINIC | Facility: CLINIC | Age: 68
End: 2018-01-09

## 2018-01-09 VITALS
SYSTOLIC BLOOD PRESSURE: 126 MMHG | DIASTOLIC BLOOD PRESSURE: 72 MMHG | OXYGEN SATURATION: 99 % | WEIGHT: 223.4 LBS | BODY MASS INDEX: 30.26 KG/M2 | HEIGHT: 72 IN | HEART RATE: 75 BPM

## 2018-01-09 DIAGNOSIS — K52.1 DIARRHEA DUE TO DRUG: Primary | ICD-10-CM

## 2018-01-09 DIAGNOSIS — E11.9 TYPE 2 DIABETES MELLITUS WITHOUT COMPLICATION, UNSPECIFIED LONG TERM INSULIN USE STATUS: ICD-10-CM

## 2018-01-09 PROCEDURE — 99213 OFFICE O/P EST LOW 20 MIN: CPT | Performed by: FAMILY MEDICINE

## 2018-01-09 RX ORDER — INSULIN GLARGINE 100 [IU]/ML
186 INJECTION, SOLUTION SUBCUTANEOUS NIGHTLY
Qty: 50 ML | Refills: 5 | Status: SHIPPED | OUTPATIENT
Start: 2018-01-09 | End: 2018-04-11 | Stop reason: SDUPTHER

## 2018-01-09 RX ORDER — CLOPIDOGREL BISULFATE 75 MG/1
75 TABLET ORAL DAILY
Status: ON HOLD | COMMUNITY
Start: 2018-01-04 | End: 2018-03-23

## 2018-01-09 NOTE — PROGRESS NOTES
Subjective   Beka Zavaleta is a 67 y.o. male.     Diarrhea    This is a chronic problem. The current episode started more than 1 month ago. The problem occurs 5 to 10 times per day. The problem has been waxing and waning. The stool consistency is described as watery. The patient states that diarrhea awakens him from sleep. Pertinent negatives include no abdominal pain or fever.   Fatigue   Associated symptoms include fatigue. Pertinent negatives include no abdominal pain or fever.        The following portions of the patient's history were reviewed and updated as appropriate: allergies, current medications, past family history, past medical history, past social history, past surgical history and problem list.    Review of Systems   Constitutional: Positive for fatigue. Negative for fever.   Gastrointestinal: Positive for diarrhea. Negative for abdominal pain.       Objective   Physical Exam   Constitutional: He is oriented to person, place, and time. He appears well-developed and well-nourished. No distress.   HENT:   Head: Normocephalic and atraumatic.   Abdominal: Soft. Normal appearance. There is no hepatosplenomegaly. There is no tenderness.   Neurological: He is alert and oriented to person, place, and time.   Skin: Skin is warm and dry. He is not diaphoretic.   Psychiatric: He has a normal mood and affect. His behavior is normal. Judgment and thought content normal.   Nursing note and vitals reviewed.      Assessment/Plan   Problems Addressed this Visit        Endocrine    Type 2 diabetes mellitus    Relevant Medications    insulin glargine (LANTUS) 100 UNIT/ML injection      Other Visit Diagnoses     Diarrhea due to drug    -  Primary

## 2018-01-31 ENCOUNTER — TELEPHONE (OUTPATIENT)
Dept: FAMILY MEDICINE CLINIC | Facility: CLINIC | Age: 68
End: 2018-01-31

## 2018-01-31 RX ORDER — DOXAZOSIN MESYLATE 4 MG/1
4 TABLET ORAL NIGHTLY
Qty: 90 TABLET | Refills: 3 | Status: SHIPPED | OUTPATIENT
Start: 2018-01-31 | End: 2019-01-28 | Stop reason: SDUPTHER

## 2018-02-07 ENCOUNTER — CLINICAL SUPPORT (OUTPATIENT)
Dept: FAMILY MEDICINE CLINIC | Facility: CLINIC | Age: 68
End: 2018-02-07

## 2018-02-07 DIAGNOSIS — D51.0 PERNICIOUS ANEMIA: Primary | ICD-10-CM

## 2018-02-07 PROCEDURE — 96372 THER/PROPH/DIAG INJ SC/IM: CPT | Performed by: INTERNAL MEDICINE

## 2018-02-07 RX ADMIN — CYANOCOBALAMIN 1000 MCG: 1000 INJECTION, SOLUTION INTRAMUSCULAR; SUBCUTANEOUS at 10:14

## 2018-02-15 ENCOUNTER — TELEPHONE (OUTPATIENT)
Dept: FAMILY MEDICINE CLINIC | Facility: CLINIC | Age: 68
End: 2018-02-15

## 2018-02-15 RX ORDER — ATENOLOL 100 MG/1
100 TABLET ORAL DAILY
Qty: 90 TABLET | Refills: 3 | Status: SHIPPED | OUTPATIENT
Start: 2018-02-15 | End: 2018-05-17 | Stop reason: SDUPTHER

## 2018-02-21 ENCOUNTER — OFFICE VISIT (OUTPATIENT)
Dept: GASTROENTEROLOGY | Facility: CLINIC | Age: 68
End: 2018-02-21

## 2018-02-21 VITALS
BODY MASS INDEX: 31.31 KG/M2 | DIASTOLIC BLOOD PRESSURE: 86 MMHG | SYSTOLIC BLOOD PRESSURE: 148 MMHG | HEIGHT: 72 IN | WEIGHT: 231.2 LBS | HEART RATE: 78 BPM

## 2018-02-21 DIAGNOSIS — Z12.11 ENCOUNTER FOR COLONOSCOPY DUE TO HISTORY OF ADENOMATOUS COLONIC POLYPS: Primary | ICD-10-CM

## 2018-02-21 DIAGNOSIS — Z86.010 ENCOUNTER FOR COLONOSCOPY DUE TO HISTORY OF ADENOMATOUS COLONIC POLYPS: Primary | ICD-10-CM

## 2018-02-21 PROCEDURE — 99212 OFFICE O/P EST SF 10 MIN: CPT | Performed by: NURSE PRACTITIONER

## 2018-02-21 RX ORDER — DEXTROSE AND SODIUM CHLORIDE 5; .45 G/100ML; G/100ML
30 INJECTION, SOLUTION INTRAVENOUS CONTINUOUS PRN
Status: CANCELLED | OUTPATIENT
Start: 2018-03-23

## 2018-02-21 NOTE — PROGRESS NOTES
Chief Complaint   Patient presents with   • Colonoscopy     recall letter       Subjective    Beka Zavaleta is a 67 y.o. male. he is being seen for consultation today at the request of Dr. Mott.  History of Present Illness  67-year-old male presents to discuss screening colonoscopy.  He denies any abdominal pain, nausea, vomiting or changes in his bowel habits.  States he was recently placed on trulicity and  had issues with diarrhea however that is improved since stopping that medication.  Colonoscopy completed 2/18/13 noted diverticulosis and 2 polyps in the cecum, 3 polyps at 67 cm  And 48 cm from entry.  Cecal polyp was tubular adenoma.  Polyp at 67 cm also tubular adenoma and polyp at 48 cm was hyperplastic.  Patient is on aspirin and Plavix due to history of cardiac stents has been cleared to cough medication prior to procedure.  He will discontinue Plavix 7 days prior aspirin 3 days prior.  He is concerned about hyperglycemia which occurred his last procedure.  He will talk to Dr. Mott tomorrow regarding possible sliding scale protocol on day of procedure.  Plan; schedule patient for screening colonoscopy due to history of adenomatous colonic polyps.     The following portions of the patient's history were reviewed and updated as appropriate:   Past Medical History:   Diagnosis Date   • Cobalamin deficiency    • Coronary atherosclerosis     post stent      • Diverticular disease of colon    • Encounter for screening for malignant neoplasm of prostate 08/13/2014   • Essential hypertension    • GERD (gastroesophageal reflux disease)    • Goiter    • History of colon polyps    • History of echocardiogram 05/10/2016    Mild dilated LV with normal LV systolic function, with LVEF of 60%.Diastolic dysfunction of the left ventricle.Moderately left atrial dilatation.Midl mitral and pulmonic valve regurg.Trivial tricuspid valve regurg.No pul.htn.[facility]   • History of Holter monitoring 05/09/2016    Rare  "PACs and PVCs.One run of SVT with 6 beats at 147 BPM. [facility].   • History of trigeminal neuralgia    • Hypercholesterolemia    • Hyperkalemia    • Impotence    • Malaise and fatigue    • Obstructive sleep apnea syndrome    • Plantar fasciitis    • Type 2 diabetes mellitus    • Type II diabetes mellitus, uncontrolled    • Vitamin deficiency      Past Surgical History:   Procedure Laterality Date   • CARDIAC CATHETERIZATION  12/18/2014    Stent in LAD widely patent. Distal LAD mild 50% stenosis. Circumflex PDA about 50-70%. RCA nondominant. Circumflex dominant.   • INJECTION OF MEDICATION  07/05/2016    B12 (Cobalamin deficiency) (53)     Family History   Problem Relation Age of Onset   • Diabetes Other    • Heart disease Other    • Hypertension Other    • Stroke Other        Current Outpatient Prescriptions   Medication Sig Dispense Refill   • amLODIPine (NORVASC) 10 MG tablet Take 1 tablet by mouth Daily. 90 tablet 3   • aspirin 81 MG tablet Take 81 mg by mouth daily.     • atenolol (TENORMIN) 100 MG tablet Take 1 tablet by mouth Daily. 90 tablet 3   • atorvastatin (LIPITOR) 80 MG tablet Take 1 tablet by mouth Daily. 90 tablet 3   • carBAMazepine XR (TEGretol  XR) 100 MG 12 hr tablet Take 4 tablets in the Morning, 3 tablets at Lunch and 3 tablets at Bedtime 300 tablet 5   • clopidogrel (PLAVIX) 75 MG tablet Take 75 mg by mouth Daily.     • colestipol (COLESTID) 1 G tablet TAKE TWO TABLETS BY MOUTH TWICE DAILY 360 tablet 3   • doxazosin (CARDURA) 4 MG tablet Take 1 tablet by mouth Every Night. 90 tablet 3   • glucose blood (ACCU-CHEK ENDER) test strip Test sugars up to 4 times daily as directed by Physician.     • insulin glargine (LANTUS) 100 UNIT/ML injection Inject 186 Units under the skin Every Night. (Dosage Increased due to being on Prednisone) 50 mL 05   • Insulin Syringe-Needle U-100 30G X 7/16\" 1 ML misc daily.     • isosorbide mononitrate (IMDUR) 30 MG 24 hr tablet Take 1 tablet by mouth " Daily. Take 1/2 pill once a day 30 tablet 5   • lisinopril (PRINIVIL,ZESTRIL) 40 MG tablet Take 1 tablet by mouth 2 (Two) Times a Day. 60 tablet 5   • magnesium oxide (MAGOX) 400 (241.3 MG) MG tablet tablet Take 400 mg by mouth Daily.     • metFORMIN (GLUCOPHAGE) 500 MG tablet Take 1 tablet by mouth 2 (Two) Times a Day. 60 tablet 5   • Multiple Vitamins-Minerals (ICAPS PO) Take  by mouth daily.     • nitroglycerin (NITROSTAT) 0.4 MG SL tablet Place 0.4 mg under the tongue as needed for chest pain. repeat X 1 in 5 min. if not relieved and then got to ER or call an ambulance     • OMEGA-3 FATTY ACIDS-VITAMIN E PO Take  by mouth.     • omeprazole (priLOSEC) 40 MG capsule Take 1 capsule by mouth Daily. 30 capsule 5   • RELION ULTRA THIN PLUS LANCETS misc test once daily     • SITagliptin (JANUVIA) 100 MG tablet Take 1 tablet by mouth Daily. 30 tablet 5   • spironolactone (ALDACTONE) 25 MG tablet Take 1 tablet by mouth Daily. 90 tablet 3     Current Facility-Administered Medications   Medication Dose Route Frequency Provider Last Rate Last Dose   • cyanocobalamin injection 1,000 mcg  1,000 mcg Intramuscular Q28 Days Sundeep Castillo MD   1,000 mcg at 02/07/18 1014   • meloxicam (MOBIC) tablet 15 mg  15 mg Oral Daily Mario Smart MD         No Known Allergies  Social History     Social History   • Marital status:      Spouse name: N/A   • Number of children: N/A   • Years of education: N/A     Social History Main Topics   • Smoking status: Former Smoker   • Smokeless tobacco: Former User     Types: Chew   • Alcohol use No   • Drug use: No   • Sexual activity: Defer     Other Topics Concern   • None     Social History Narrative       Review of Systems  Review of Systems   Constitutional: Negative for activity change, appetite change, chills, diaphoresis, fatigue, fever and unexpected weight change.   HENT: Negative for sore throat and trouble swallowing.    Respiratory: Negative for shortness of breath.   "  Gastrointestinal: Negative for abdominal distention, abdominal pain, anal bleeding, blood in stool, constipation, diarrhea, nausea, rectal pain and vomiting.   Musculoskeletal: Negative for arthralgias.   Skin: Negative for pallor.   Neurological: Negative for light-headedness.        /86 (BP Location: Left arm, Patient Position: Sitting, Cuff Size: Adult)  Pulse 78  Ht 182.9 cm (72.01\")  Wt 105 kg (231 lb 3.2 oz)  BMI 31.35 kg/m2    Objective    Physical Exam   Constitutional: He is oriented to person, place, and time. He appears well-developed and well-nourished. He is cooperative. No distress.   HENT:   Head: Normocephalic and atraumatic.   Neck: Normal range of motion. Neck supple. No thyromegaly present.   Cardiovascular: Normal rate, regular rhythm and normal heart sounds.    Pulmonary/Chest: Effort normal and breath sounds normal. He has no wheezes. He has no rhonchi. He has no rales.   Abdominal: Soft. Normal appearance and bowel sounds are normal. He exhibits no shifting dullness and no distension. There is no hepatosplenomegaly. There is no tenderness. There is no rigidity and no guarding. No hernia.   Lymphadenopathy:     He has no cervical adenopathy.   Neurological: He is alert and oriented to person, place, and time.   Skin: Skin is warm, dry and intact. No rash noted. No pallor.   Psychiatric: He has a normal mood and affect. His speech is normal.     Lab on 11/10/2017   Component Date Value Ref Range Status   • PSA 11/10/2017 1.160  0.000 - 4.000 ng/mL Final     Assessment/Plan      1. Encounter for colonoscopy due to history of adenomatous colonic polyps    .       Orders placed during this encounter include:      COLONOSCOPY (N/A)    Review and/or summary of lab tests, radiology, procedures, medications. Review and summary of old records and obtaining of history. The risks and benefits of my recommendations, as well as other treatment options were discussed with the patient today. " Questions were answered.    No orders of the defined types were placed in this encounter.      Follow-up: Return for Recheck.          This document has been electronically signed by EMMANUELLE Owens on February 21, 2018 10:58 AM             Results for orders placed or performed in visit on 11/10/17   PSA Screen   Result Value Ref Range    PSA 1.160 0.000 - 4.000 ng/mL   Results for orders placed or performed in visit on 11/10/17   Microalbumin / Creatinine Urine Ratio - Urine, Clean Catch   Result Value Ref Range    Microalbumin/Creatinine Ratio 5.0 0.0 - 30.0 mg/g    Creatinine, Urine 118.9 mg/dL    Microalbumin, Urine 0.6 mg/L   Magnesium   Result Value Ref Range    Magnesium 1.9 1.6 - 2.3 mg/dL   Hemoglobin A1c   Result Value Ref Range    Hemoglobin A1C 6.5 (H) 4 - 5.6 %   Vitamin B12   Result Value Ref Range    Vitamin B-12 396 239 - 931 pg/mL   Lipid Panel   Result Value Ref Range    Total Cholesterol 129 0 - 199 mg/dL    Triglycerides 155 20 - 199 mg/dL    HDL Cholesterol 39 (L) 60 - 200 mg/dL    LDL Cholesterol  66 1 - 129 mg/dL    LDL/HDL Ratio 1.51 0.00 - 3.55   Comprehensive Metabolic Panel   Result Value Ref Range    Glucose 62 60 - 100 mg/dL    BUN 17 7 - 21 mg/dL    Creatinine 0.97 0.70 - 1.30 mg/dL    Sodium 141 137 - 145 mmol/L    Potassium 4.7 3.5 - 5.1 mmol/L    Chloride 101 95 - 110 mmol/L    CO2 28.0 22.0 - 31.0 mmol/L    Calcium 9.6 8.4 - 10.2 mg/dL    Total Protein 7.5 6.3 - 8.6 g/dL    Albumin 4.40 3.40 - 4.80 g/dL    ALT (SGPT) 41 21 - 72 U/L    AST (SGOT) 76 (H) 17 - 59 U/L    Alkaline Phosphatase 51 38 - 126 U/L    Total Bilirubin 0.4 0.2 - 1.3 mg/dL    eGFR Non  Amer 77 49 - 113 mL/min/1.73    Globulin 3.1 2.3 - 3.5 gm/dL    A/G Ratio 1.4 1.1 - 1.8 g/dL    BUN/Creatinine Ratio 17.5 7.0 - 25.0    Anion Gap 12.0 5.0 - 15.0 mmol/L   Results for orders placed or performed in visit on 05/12/17   CBC Auto Differential   Result Value Ref Range    WBC 4.90 3.20 - 9.80 10*3/mm3    RBC  3.88 (L) 4.37 - 5.74 10*6/mm3    Hemoglobin 12.5 (L) 13.7 - 17.3 g/dL    Hematocrit 35.5 (L) 39.0 - 49.0 %    MCV 91.5 80.0 - 98.0 fL    MCH 32.2 26.5 - 34.0 pg    MCHC 35.2 31.5 - 36.3 g/dL    RDW 12.8 11.5 - 14.5 %    RDW-SD 42.3 35.1 - 43.9 fl    MPV 10.0 8.0 - 12.0 fL    Platelets 139 (L) 150 - 450 10*3/mm3    Neutrophil % 55.3 37.0 - 80.0 %    Lymphocyte % 34.5 10.0 - 50.0 %    Monocyte % 8.6 0.0 - 12.0 %    Eosinophil % 1.0 0.0 - 7.0 %    Basophil % 0.4 0.0 - 2.0 %    Immature Grans % 0.2 0.0 - 0.5 %    Neutrophils, Absolute 2.71 2.00 - 8.60 10*3/mm3    Lymphocytes, Absolute 1.69 0.60 - 4.20 10*3/mm3    Monocytes, Absolute 0.42 0.00 - 0.90 10*3/mm3    Eosinophils, Absolute 0.05 0.00 - 0.70 10*3/mm3    Basophils, Absolute 0.02 0.00 - 0.20 10*3/mm3    Immature Grans, Absolute 0.01 0.00 - 0.02 10*3/mm3   Hepatitis Panel, Acute   Result Value Ref Range    Hepatitis C Ab Negative Negative    Hep A IgM Negative Negative    Hep B C IgM Negative Negative    Hepatitis B Surface Ag Negative Negative   TSH   Result Value Ref Range    TSH 0.360 (L) 0.460 - 4.680 mIU/mL   T4, free   Result Value Ref Range    Free T4 0.79 0.78 - 2.19 ng/dL   Magnesium   Result Value Ref Range    Magnesium 1.8 1.6 - 2.3 mg/dL   Vitamin B12   Result Value Ref Range    Vitamin B-12 358 239 - 931 pg/mL   Results for orders placed or performed in visit on 05/12/17   Microalbumin / Creatinine Urine Ratio   Result Value Ref Range    Microalbumin/Creatinine Ratio 7.4 0.0 - 30.0 mg/g    Creatinine, Urine 175.5 mg/dL    Microalbumin, Urine 1.3 mg/L   Hemoglobin A1c   Result Value Ref Range    Hemoglobin A1C 7.68 (H) 4 - 5.6 %   Lipid Panel   Result Value Ref Range    Total Cholesterol 127 0 - 199 mg/dL    Triglycerides 178 20 - 199 mg/dL    HDL Cholesterol 39 (L) 60 - 200 mg/dL    LDL Cholesterol  55 1 - 129 mg/dL    LDL/HDL Ratio 1.34 0.00 - 3.55   Comprehensive Metabolic Panel   Result Value Ref Range    Glucose 122 (H) 60 - 100 mg/dL    BUN 20 7 -  21 mg/dL    Creatinine 0.78 0.70 - 1.30 mg/dL    Sodium 145 137 - 145 mmol/L    Potassium 4.4 3.5 - 5.1 mmol/L    Chloride 106 95 - 110 mmol/L    CO2 27.0 22.0 - 31.0 mmol/L    Calcium 9.2 8.4 - 10.2 mg/dL    Total Protein 7.3 6.3 - 8.6 g/dL    Albumin 4.30 3.40 - 4.80 g/dL    ALT (SGPT) 36 21 - 72 U/L    AST (SGOT) 62 (H) 17 - 59 U/L    Alkaline Phosphatase 60 38 - 126 U/L    Total Bilirubin 0.5 0.2 - 1.3 mg/dL    eGFR Non  Amer 99 49 - 113 mL/min/1.73    Globulin 3.0 2.3 - 3.5 gm/dL    A/G Ratio 1.4 1.1 - 1.8 g/dL    BUN/Creatinine Ratio 25.6 (H) 7.0 - 25.0    Anion Gap 12.0 5.0 - 15.0 mmol/L   Results for orders placed or performed during the hospital encounter of 04/09/17   Lactate Acid, Reflex   Result Value Ref Range    Lactate 1.4 0.5 - 2.0 mmol/L   Gold Top - SST   Result Value Ref Range    Extra Tube Hold for add-ons.    CBC Auto Differential   Result Value Ref Range    WBC 5.77 3.20 - 9.80 10*3/mm3    RBC 3.74 (L) 4.37 - 5.74 10*6/mm3    Hemoglobin 12.0 (L) 13.7 - 17.3 g/dL    Hematocrit 33.4 (L) 39.0 - 49.0 %    MCV 89.3 80.0 - 98.0 fL    MCH 32.1 26.5 - 34.0 pg    MCHC 35.9 31.5 - 36.3 g/dL    RDW 12.0 11.5 - 14.5 %    RDW-SD 39.2 35.1 - 43.9 fl    MPV 10.1 8.0 - 12.0 fL    Platelets 141 (L) 150 - 450 10*3/mm3    Neutrophil % 63.5 37.0 - 80.0 %    Lymphocyte % 25.6 10.0 - 50.0 %    Monocyte % 9.4 0.0 - 12.0 %    Eosinophil % 0.9 0.0 - 7.0 %    Basophil % 0.3 0.0 - 2.0 %    Immature Grans % 0.3 0.0 - 0.5 %    Neutrophils, Absolute 3.66 2.00 - 8.60 10*3/mm3    Lymphocytes, Absolute 1.48 0.60 - 4.20 10*3/mm3    Monocytes, Absolute 0.54 0.00 - 0.90 10*3/mm3    Eosinophils, Absolute 0.05 0.00 - 0.70 10*3/mm3    Basophils, Absolute 0.02 0.00 - 0.20 10*3/mm3    Immature Grans, Absolute 0.02 0.00 - 0.02 10*3/mm3     *Note: Due to a large number of results and/or encounters for the requested time period, some results have not been displayed. A complete set of results can be found in Results Review.

## 2018-02-22 ENCOUNTER — OFFICE VISIT (OUTPATIENT)
Dept: FAMILY MEDICINE CLINIC | Facility: CLINIC | Age: 68
End: 2018-02-22

## 2018-02-22 VITALS
HEART RATE: 57 BPM | WEIGHT: 226 LBS | SYSTOLIC BLOOD PRESSURE: 128 MMHG | OXYGEN SATURATION: 100 % | BODY MASS INDEX: 30.61 KG/M2 | DIASTOLIC BLOOD PRESSURE: 70 MMHG | HEIGHT: 72 IN

## 2018-02-22 DIAGNOSIS — I25.10 ATHEROSCLEROSIS OF NATIVE CORONARY ARTERY OF NATIVE HEART WITHOUT ANGINA PECTORIS: ICD-10-CM

## 2018-02-22 DIAGNOSIS — Z79.4 TYPE 2 DIABETES MELLITUS WITH HYPERGLYCEMIA, WITH LONG-TERM CURRENT USE OF INSULIN (HCC): Primary | ICD-10-CM

## 2018-02-22 DIAGNOSIS — E11.65 TYPE 2 DIABETES MELLITUS WITH HYPERGLYCEMIA, WITH LONG-TERM CURRENT USE OF INSULIN (HCC): Primary | ICD-10-CM

## 2018-02-22 DIAGNOSIS — I10 ESSENTIAL HYPERTENSION: ICD-10-CM

## 2018-02-22 PROCEDURE — 99214 OFFICE O/P EST MOD 30 MIN: CPT | Performed by: FAMILY MEDICINE

## 2018-02-22 NOTE — PROGRESS NOTES
Subjective   Beka Zavaleta is a 67 y.o. male.     Hypertension   This is a chronic problem. The current episode started more than 1 year ago. The problem is unchanged. The problem is controlled. Pertinent negatives include no chest pain, orthopnea, palpitations, peripheral edema, PND or shortness of breath.   Diabetes   He presents for his follow-up diabetic visit. He has type 2 diabetes mellitus. His disease course has been fluctuating. Pertinent negatives for hypoglycemia include no confusion or hunger. Pertinent negatives for diabetes include no chest pain, no foot paresthesias, no foot ulcerations, no polydipsia and no polyuria. His breakfast blood glucose is taken between 7-8 am. His breakfast blood glucose range is generally 180-200 mg/dl. His highest blood glucose is >200 mg/dl. (Hypoglycemia improved with dc trulicity  ) An ACE inhibitor/angiotensin II receptor blocker is being taken. Eye exam is current.   Coronary Artery Disease   Presents for follow-up visit. Symptoms include weight gain. Pertinent negatives include no chest pain, chest pressure, chest tightness, leg swelling, palpitations or shortness of breath.   Heartburn   He reports no chest pain, no dysphagia or no heartburn.        The following portions of the patient's history were reviewed and updated as appropriate: allergies, current medications, past family history, past medical history, past social history, past surgical history and problem list.    Review of Systems   Constitutional: Positive for weight gain.   Respiratory: Negative for chest tightness and shortness of breath.    Cardiovascular: Negative for chest pain, palpitations, orthopnea, leg swelling and PND.   Gastrointestinal: Negative for dysphagia and heartburn.   Endocrine: Negative for polydipsia and polyuria.   Psychiatric/Behavioral: Negative for confusion.       Objective   Physical Exam   Constitutional: He is oriented to person, place, and time. He appears well-developed  and well-nourished. No distress.   HENT:   Head: Normocephalic and atraumatic.   Cardiovascular: Normal rate, regular rhythm, normal heart sounds and intact distal pulses.  Exam reveals no gallop and no friction rub.    No murmur heard.  Pulmonary/Chest: Effort normal and breath sounds normal. No respiratory distress. He has no wheezes. He has no rales. He exhibits no tenderness.   Abdominal: Soft. Normal appearance. There is no tenderness.   Musculoskeletal: He exhibits no edema.    Beka had a diabetic foot exam performed (callus noted) today.   During the foot exam he had a monofilament test performed (normal).    Vascular Status -  His exam exhibits right foot vasculature normal. His exam exhibits no right foot edema. His exam exhibits left foot vasculature normal. His exam exhibits no left foot edema.  Neurological: He is alert and oriented to person, place, and time.   Skin: Skin is warm and dry. He is not diaphoretic.   Psychiatric: He has a normal mood and affect. His behavior is normal. Judgment and thought content normal.   Nursing note and vitals reviewed.      Assessment/Plan   Problems Addressed this Visit        Cardiovascular and Mediastinum    Essential hypertension    Coronary atherosclerosis       Endocrine    Type 2 diabetes mellitus - Primary

## 2018-02-27 RX ORDER — POLYETHYLENE GLYCOL 3350, SODIUM CHLORIDE, SODIUM BICARBONATE, POTASSIUM CHLORIDE 420; 11.2; 5.72; 1.48 G/4L; G/4L; G/4L; G/4L
4000 POWDER, FOR SOLUTION ORAL ONCE
Qty: 4000 ML | Refills: 0 | Status: SHIPPED | OUTPATIENT
Start: 2018-02-27 | End: 2018-02-27

## 2018-03-01 ENCOUNTER — TELEPHONE (OUTPATIENT)
Dept: FAMILY MEDICINE CLINIC | Facility: CLINIC | Age: 68
End: 2018-03-01

## 2018-03-01 NOTE — TELEPHONE ENCOUNTER
CHARLES-WIFE CALLED FOR HIM- NEEDS REFILLS FOR HIS METFORMIN 500 MG SENT TO [facility] IN [place]. HE SEES DR GHOTRA AND CAN BE REACHED [phone] IF ANY QUESTIONS.

## 2018-03-06 ENCOUNTER — CLINICAL SUPPORT (OUTPATIENT)
Dept: FAMILY MEDICINE CLINIC | Facility: CLINIC | Age: 68
End: 2018-03-06

## 2018-03-06 DIAGNOSIS — D51.0 PERNICIOUS ANEMIA: Primary | ICD-10-CM

## 2018-03-06 PROCEDURE — 96372 THER/PROPH/DIAG INJ SC/IM: CPT | Performed by: INTERNAL MEDICINE

## 2018-03-06 RX ADMIN — CYANOCOBALAMIN 1000 MCG: 1000 INJECTION, SOLUTION INTRAMUSCULAR; SUBCUTANEOUS at 10:40

## 2018-03-23 ENCOUNTER — ANESTHESIA (OUTPATIENT)
Dept: GASTROENTEROLOGY | Facility: HOSPITAL | Age: 68
End: 2018-03-23

## 2018-03-23 ENCOUNTER — HOSPITAL ENCOUNTER (OUTPATIENT)
Facility: HOSPITAL | Age: 68
Setting detail: HOSPITAL OUTPATIENT SURGERY
Discharge: HOME | End: 2018-03-23
Attending: SURGERY | Admitting: SURGERY

## 2018-03-23 ENCOUNTER — ANESTHESIA EVENT (OUTPATIENT)
Dept: GASTROENTEROLOGY | Facility: HOSPITAL | Age: 68
End: 2018-03-23

## 2018-03-23 VITALS
SYSTOLIC BLOOD PRESSURE: 115 MMHG | OXYGEN SATURATION: 94 % | TEMPERATURE: 97 F | WEIGHT: 217.37 LBS | BODY MASS INDEX: 29.44 KG/M2 | HEIGHT: 72 IN | HEART RATE: 63 BPM | DIASTOLIC BLOOD PRESSURE: 51 MMHG | RESPIRATION RATE: 20 BRPM

## 2018-03-23 DIAGNOSIS — Z12.11 ENCOUNTER FOR COLONOSCOPY DUE TO HISTORY OF ADENOMATOUS COLONIC POLYPS: ICD-10-CM

## 2018-03-23 DIAGNOSIS — Z86.010 ENCOUNTER FOR COLONOSCOPY DUE TO HISTORY OF ADENOMATOUS COLONIC POLYPS: ICD-10-CM

## 2018-03-23 LAB
GLUCOSE BLDC GLUCOMTR-MCNC: 220 MG/DL (ref 70–130)
GLUCOSE BLDC GLUCOMTR-MCNC: 244 MG/DL (ref 70–130)

## 2018-03-23 PROCEDURE — 25010000002 PHENYLEPHRINE PER 1 ML: Performed by: NURSE ANESTHETIST, CERTIFIED REGISTERED

## 2018-03-23 PROCEDURE — 45385 COLONOSCOPY W/LESION REMOVAL: CPT | Performed by: SURGERY

## 2018-03-23 PROCEDURE — 25010000002 PROPOFOL 10 MG/ML EMULSION: Performed by: NURSE ANESTHETIST, CERTIFIED REGISTERED

## 2018-03-23 PROCEDURE — 45380 COLONOSCOPY AND BIOPSY: CPT | Performed by: SURGERY

## 2018-03-23 PROCEDURE — 82962 GLUCOSE BLOOD TEST: CPT

## 2018-03-23 PROCEDURE — 88305 TISSUE EXAM BY PATHOLOGIST: CPT | Performed by: PATHOLOGY

## 2018-03-23 PROCEDURE — 88305 TISSUE EXAM BY PATHOLOGIST: CPT | Performed by: SURGERY

## 2018-03-23 RX ORDER — PROMETHAZINE HYDROCHLORIDE 25 MG/ML
12.5 INJECTION, SOLUTION INTRAMUSCULAR; INTRAVENOUS ONCE AS NEEDED
Status: DISCONTINUED | OUTPATIENT
Start: 2018-03-23 | End: 2018-03-23 | Stop reason: HOSPADM

## 2018-03-23 RX ORDER — ONDANSETRON 2 MG/ML
4 INJECTION INTRAMUSCULAR; INTRAVENOUS ONCE AS NEEDED
Status: DISCONTINUED | OUTPATIENT
Start: 2018-03-23 | End: 2018-03-23 | Stop reason: HOSPADM

## 2018-03-23 RX ORDER — PROPOFOL 10 MG/ML
VIAL (ML) INTRAVENOUS AS NEEDED
Status: DISCONTINUED | OUTPATIENT
Start: 2018-03-23 | End: 2018-03-23 | Stop reason: SURG

## 2018-03-23 RX ORDER — SODIUM CHLORIDE 9 MG/ML
30 INJECTION, SOLUTION INTRAVENOUS CONTINUOUS
Status: DISCONTINUED | OUTPATIENT
Start: 2018-03-23 | End: 2018-03-23

## 2018-03-23 RX ORDER — DEXTROSE AND SODIUM CHLORIDE 5; .45 G/100ML; G/100ML
20 INJECTION, SOLUTION INTRAVENOUS CONTINUOUS
Status: DISCONTINUED | OUTPATIENT
Start: 2018-03-23 | End: 2018-03-23

## 2018-03-23 RX ORDER — SODIUM CHLORIDE 9 MG/ML
50 INJECTION, SOLUTION INTRAVENOUS ONCE
Status: DISCONTINUED | OUTPATIENT
Start: 2018-03-23 | End: 2018-03-23

## 2018-03-23 RX ORDER — GLYCOPYRROLATE 0.2 MG/ML
INJECTION INTRAMUSCULAR; INTRAVENOUS AS NEEDED
Status: DISCONTINUED | OUTPATIENT
Start: 2018-03-23 | End: 2018-03-23 | Stop reason: SURG

## 2018-03-23 RX ORDER — SODIUM CHLORIDE 9 MG/ML
30 INJECTION, SOLUTION INTRAVENOUS CONTINUOUS
Status: DISCONTINUED | OUTPATIENT
Start: 2018-03-23 | End: 2018-03-23 | Stop reason: HOSPADM

## 2018-03-23 RX ORDER — PROMETHAZINE HYDROCHLORIDE 25 MG/1
25 TABLET ORAL ONCE AS NEEDED
Status: DISCONTINUED | OUTPATIENT
Start: 2018-03-23 | End: 2018-03-23 | Stop reason: HOSPADM

## 2018-03-23 RX ORDER — PROMETHAZINE HYDROCHLORIDE 25 MG/1
25 SUPPOSITORY RECTAL ONCE AS NEEDED
Status: DISCONTINUED | OUTPATIENT
Start: 2018-03-23 | End: 2018-03-23 | Stop reason: HOSPADM

## 2018-03-23 RX ORDER — CLOPIDOGREL BISULFATE 75 MG/1
75 TABLET ORAL DAILY
Qty: 30 TABLET | Refills: 0 | Status: SHIPPED | OUTPATIENT
Start: 2018-03-25 | End: 2018-12-03 | Stop reason: SDUPTHER

## 2018-03-23 RX ADMIN — SODIUM CHLORIDE: 900 INJECTION, SOLUTION INTRAVENOUS at 09:04

## 2018-03-23 RX ADMIN — PROPOFOL 100 MG: 10 INJECTION, EMULSION INTRAVENOUS at 08:47

## 2018-03-23 RX ADMIN — EPHEDRINE SULFATE 15 MG: 50 INJECTION INTRAMUSCULAR; INTRAVENOUS; SUBCUTANEOUS at 08:51

## 2018-03-23 RX ADMIN — EPHEDRINE SULFATE 10 MG: 50 INJECTION INTRAMUSCULAR; INTRAVENOUS; SUBCUTANEOUS at 08:58

## 2018-03-23 RX ADMIN — PHENYLEPHRINE HYDROCHLORIDE 100 MCG: 10 INJECTION INTRAVENOUS at 09:09

## 2018-03-23 RX ADMIN — EPHEDRINE SULFATE 25 MG: 50 INJECTION INTRAMUSCULAR; INTRAVENOUS; SUBCUTANEOUS at 08:53

## 2018-03-23 RX ADMIN — PROPOFOL 100 MG: 10 INJECTION, EMULSION INTRAVENOUS at 09:02

## 2018-03-23 RX ADMIN — PROPOFOL 50 MG: 10 INJECTION, EMULSION INTRAVENOUS at 08:56

## 2018-03-23 RX ADMIN — SODIUM CHLORIDE 30 ML/HR: 900 INJECTION, SOLUTION INTRAVENOUS at 08:43

## 2018-03-23 RX ADMIN — PROPOFOL 50 MG: 10 INJECTION, EMULSION INTRAVENOUS at 08:53

## 2018-03-23 RX ADMIN — GLYCOPYRROLATE 0.2 MCG: 0.2 INJECTION, SOLUTION INTRAMUSCULAR; INTRAVENOUS at 08:55

## 2018-03-23 NOTE — ANESTHESIA POSTPROCEDURE EVALUATION
Patient: Beka Zavaleta    Procedure Summary     Date:  03/23/18 Room / Location:  [facility] ENDOSCOPY 2 / [facility] ENDOSCOPY    Anesthesia Start:  0846 Anesthesia Stop:  0917    Procedure:  COLONOSCOPY (N/A ) Diagnosis:       Encounter for colonoscopy due to history of adenomatous colonic polyps      (Encounter for colonoscopy due to history of adenomatous colonic polyps [Z12.11, Z86.010])    Surgeon:  Riley Osorio MD Provider:      Anesthesia Type:  MAC ASA Status:  3          Anesthesia Type: MAC  Last vitals  BP   158/77 (03/23/18 0752)   Temp   97.3 °F (36.3 °C) (03/23/18 0752)   Pulse   57 (03/23/18 0752)   Resp   18 (03/23/18 0752)     SpO2   98 % (03/23/18 0752)     Post Anesthesia Care and Evaluation    Patient location during evaluation: bedside  Patient participation: complete - patient cannot participate  Level of consciousness: awake  Pain score: 0  Pain management: adequate  Airway patency: patent  Anesthetic complications: No anesthetic complications    Cardiovascular status: acceptable  Respiratory status: acceptable  Hydration status: acceptable

## 2018-03-23 NOTE — H&P (VIEW-ONLY)
Chief Complaint   Patient presents with   • Colonoscopy     recall letter       Subjective    Beka Zavaleta is a 67 y.o. male. he is being seen for consultation today at the request of Dr. Mott.  History of Present Illness  67-year-old male presents to discuss screening colonoscopy.  He denies any abdominal pain, nausea, vomiting or changes in his bowel habits.  States he was recently placed on trulicity and  had issues with diarrhea however that is improved since stopping that medication.  Colonoscopy completed 2/18/13 noted diverticulosis and 2 polyps in the cecum, 3 polyps at 67 cm  And 48 cm from entry.  Cecal polyp was tubular adenoma.  Polyp at 67 cm also tubular adenoma and polyp at 48 cm was hyperplastic.  Patient is on aspirin and Plavix due to history of cardiac stents has been cleared to cough medication prior to procedure.  He will discontinue Plavix 7 days prior aspirin 3 days prior.  He is concerned about hyperglycemia which occurred his last procedure.  He will talk to Dr. Mott tomorrow regarding possible sliding scale protocol on day of procedure.  Plan; schedule patient for screening colonoscopy due to history of adenomatous colonic polyps.     The following portions of the patient's history were reviewed and updated as appropriate:   Past Medical History:   Diagnosis Date   • Cobalamin deficiency    • Coronary atherosclerosis     post stent      • Diverticular disease of colon    • Encounter for screening for malignant neoplasm of prostate 08/13/2014   • Essential hypertension    • GERD (gastroesophageal reflux disease)    • Goiter    • History of colon polyps    • History of echocardiogram 05/10/2016    Mild dilated LV with normal LV systolic function, with LVEF of 60%.Diastolic dysfunction of the left ventricle.Moderately left atrial dilatation.Midl mitral and pulmonic valve regurg.Trivial tricuspid valve regurg.No pul.htn.[facility]   • History of Holter monitoring 05/09/2016    Rare  "PACs and PVCs.One run of SVT with 6 beats at 147 BPM. [facility].   • History of trigeminal neuralgia    • Hypercholesterolemia    • Hyperkalemia    • Impotence    • Malaise and fatigue    • Obstructive sleep apnea syndrome    • Plantar fasciitis    • Type 2 diabetes mellitus    • Type II diabetes mellitus, uncontrolled    • Vitamin deficiency      Past Surgical History:   Procedure Laterality Date   • CARDIAC CATHETERIZATION  12/18/2014    Stent in LAD widely patent. Distal LAD mild 50% stenosis. Circumflex PDA about 50-70%. RCA nondominant. Circumflex dominant.   • INJECTION OF MEDICATION  07/05/2016    B12 (Cobalamin deficiency) (53)     Family History   Problem Relation Age of Onset   • Diabetes Other    • Heart disease Other    • Hypertension Other    • Stroke Other        Current Outpatient Prescriptions   Medication Sig Dispense Refill   • amLODIPine (NORVASC) 10 MG tablet Take 1 tablet by mouth Daily. 90 tablet 3   • aspirin 81 MG tablet Take 81 mg by mouth daily.     • atenolol (TENORMIN) 100 MG tablet Take 1 tablet by mouth Daily. 90 tablet 3   • atorvastatin (LIPITOR) 80 MG tablet Take 1 tablet by mouth Daily. 90 tablet 3   • carBAMazepine XR (TEGretol  XR) 100 MG 12 hr tablet Take 4 tablets in the Morning, 3 tablets at Lunch and 3 tablets at Bedtime 300 tablet 5   • clopidogrel (PLAVIX) 75 MG tablet Take 75 mg by mouth Daily.     • colestipol (COLESTID) 1 G tablet TAKE TWO TABLETS BY MOUTH TWICE DAILY 360 tablet 3   • doxazosin (CARDURA) 4 MG tablet Take 1 tablet by mouth Every Night. 90 tablet 3   • glucose blood (ACCU-CHEK ENDER) test strip Test sugars up to 4 times daily as directed by Physician.     • insulin glargine (LANTUS) 100 UNIT/ML injection Inject 186 Units under the skin Every Night. (Dosage Increased due to being on Prednisone) 50 mL 05   • Insulin Syringe-Needle U-100 30G X 7/16\" 1 ML misc daily.     • isosorbide mononitrate (IMDUR) 30 MG 24 hr tablet Take 1 tablet by mouth " Daily. Take 1/2 pill once a day 30 tablet 5   • lisinopril (PRINIVIL,ZESTRIL) 40 MG tablet Take 1 tablet by mouth 2 (Two) Times a Day. 60 tablet 5   • magnesium oxide (MAGOX) 400 (241.3 MG) MG tablet tablet Take 400 mg by mouth Daily.     • metFORMIN (GLUCOPHAGE) 500 MG tablet Take 1 tablet by mouth 2 (Two) Times a Day. 60 tablet 5   • Multiple Vitamins-Minerals (ICAPS PO) Take  by mouth daily.     • nitroglycerin (NITROSTAT) 0.4 MG SL tablet Place 0.4 mg under the tongue as needed for chest pain. repeat X 1 in 5 min. if not relieved and then got to ER or call an ambulance     • OMEGA-3 FATTY ACIDS-VITAMIN E PO Take  by mouth.     • omeprazole (priLOSEC) 40 MG capsule Take 1 capsule by mouth Daily. 30 capsule 5   • RELION ULTRA THIN PLUS LANCETS misc test once daily     • SITagliptin (JANUVIA) 100 MG tablet Take 1 tablet by mouth Daily. 30 tablet 5   • spironolactone (ALDACTONE) 25 MG tablet Take 1 tablet by mouth Daily. 90 tablet 3     Current Facility-Administered Medications   Medication Dose Route Frequency Provider Last Rate Last Dose   • cyanocobalamin injection 1,000 mcg  1,000 mcg Intramuscular Q28 Days Sundeep Castillo MD   1,000 mcg at 02/07/18 1014   • meloxicam (MOBIC) tablet 15 mg  15 mg Oral Daily Mario Smart MD         No Known Allergies  Social History     Social History   • Marital status:      Spouse name: N/A   • Number of children: N/A   • Years of education: N/A     Social History Main Topics   • Smoking status: Former Smoker   • Smokeless tobacco: Former User     Types: Chew   • Alcohol use No   • Drug use: No   • Sexual activity: Defer     Other Topics Concern   • None     Social History Narrative       Review of Systems  Review of Systems   Constitutional: Negative for activity change, appetite change, chills, diaphoresis, fatigue, fever and unexpected weight change.   HENT: Negative for sore throat and trouble swallowing.    Respiratory: Negative for shortness of breath.     "  Gastrointestinal: Negative for abdominal distention, abdominal pain, anal bleeding, blood in stool, constipation, diarrhea, nausea, rectal pain and vomiting.   Musculoskeletal: Negative for arthralgias.   Skin: Negative for pallor.   Neurological: Negative for light-headedness.        /86 (BP Location: Left arm, Patient Position: Sitting, Cuff Size: Adult)  Pulse 78  Ht 182.9 cm (72.01\")  Wt 105 kg (231 lb 3.2 oz)  BMI 31.35 kg/m2    Objective    Physical Exam   Constitutional: He is oriented to person, place, and time. He appears well-developed and well-nourished. He is cooperative. No distress.   HENT:   Head: Normocephalic and atraumatic.   Neck: Normal range of motion. Neck supple. No thyromegaly present.   Cardiovascular: Normal rate, regular rhythm and normal heart sounds.    Pulmonary/Chest: Effort normal and breath sounds normal. He has no wheezes. He has no rhonchi. He has no rales.   Abdominal: Soft. Normal appearance and bowel sounds are normal. He exhibits no shifting dullness and no distension. There is no hepatosplenomegaly. There is no tenderness. There is no rigidity and no guarding. No hernia.   Lymphadenopathy:     He has no cervical adenopathy.   Neurological: He is alert and oriented to person, place, and time.   Skin: Skin is warm, dry and intact. No rash noted. No pallor.   Psychiatric: He has a normal mood and affect. His speech is normal.     Lab on 11/10/2017   Component Date Value Ref Range Status   • PSA 11/10/2017 1.160  0.000 - 4.000 ng/mL Final     Assessment/Plan      1. Encounter for colonoscopy due to history of adenomatous colonic polyps    .       Orders placed during this encounter include:      COLONOSCOPY (N/A)    Review and/or summary of lab tests, radiology, procedures, medications. Review and summary of old records and obtaining of history. The risks and benefits of my recommendations, as well as other treatment options were discussed with the patient today. " Questions were answered.    No orders of the defined types were placed in this encounter.      Follow-up: Return for Recheck.          This document has been electronically signed by EMMANUELLE Owens on February 21, 2018 10:58 AM             Results for orders placed or performed in visit on 11/10/17   PSA Screen   Result Value Ref Range    PSA 1.160 0.000 - 4.000 ng/mL   Results for orders placed or performed in visit on 11/10/17   Microalbumin / Creatinine Urine Ratio - Urine, Clean Catch   Result Value Ref Range    Microalbumin/Creatinine Ratio 5.0 0.0 - 30.0 mg/g    Creatinine, Urine 118.9 mg/dL    Microalbumin, Urine 0.6 mg/L   Magnesium   Result Value Ref Range    Magnesium 1.9 1.6 - 2.3 mg/dL   Hemoglobin A1c   Result Value Ref Range    Hemoglobin A1C 6.5 (H) 4 - 5.6 %   Vitamin B12   Result Value Ref Range    Vitamin B-12 396 239 - 931 pg/mL   Lipid Panel   Result Value Ref Range    Total Cholesterol 129 0 - 199 mg/dL    Triglycerides 155 20 - 199 mg/dL    HDL Cholesterol 39 (L) 60 - 200 mg/dL    LDL Cholesterol  66 1 - 129 mg/dL    LDL/HDL Ratio 1.51 0.00 - 3.55   Comprehensive Metabolic Panel   Result Value Ref Range    Glucose 62 60 - 100 mg/dL    BUN 17 7 - 21 mg/dL    Creatinine 0.97 0.70 - 1.30 mg/dL    Sodium 141 137 - 145 mmol/L    Potassium 4.7 3.5 - 5.1 mmol/L    Chloride 101 95 - 110 mmol/L    CO2 28.0 22.0 - 31.0 mmol/L    Calcium 9.6 8.4 - 10.2 mg/dL    Total Protein 7.5 6.3 - 8.6 g/dL    Albumin 4.40 3.40 - 4.80 g/dL    ALT (SGPT) 41 21 - 72 U/L    AST (SGOT) 76 (H) 17 - 59 U/L    Alkaline Phosphatase 51 38 - 126 U/L    Total Bilirubin 0.4 0.2 - 1.3 mg/dL    eGFR Non  Amer 77 49 - 113 mL/min/1.73    Globulin 3.1 2.3 - 3.5 gm/dL    A/G Ratio 1.4 1.1 - 1.8 g/dL    BUN/Creatinine Ratio 17.5 7.0 - 25.0    Anion Gap 12.0 5.0 - 15.0 mmol/L   Results for orders placed or performed in visit on 05/12/17   CBC Auto Differential   Result Value Ref Range    WBC 4.90 3.20 - 9.80 10*3/mm3    RBC  3.88 (L) 4.37 - 5.74 10*6/mm3    Hemoglobin 12.5 (L) 13.7 - 17.3 g/dL    Hematocrit 35.5 (L) 39.0 - 49.0 %    MCV 91.5 80.0 - 98.0 fL    MCH 32.2 26.5 - 34.0 pg    MCHC 35.2 31.5 - 36.3 g/dL    RDW 12.8 11.5 - 14.5 %    RDW-SD 42.3 35.1 - 43.9 fl    MPV 10.0 8.0 - 12.0 fL    Platelets 139 (L) 150 - 450 10*3/mm3    Neutrophil % 55.3 37.0 - 80.0 %    Lymphocyte % 34.5 10.0 - 50.0 %    Monocyte % 8.6 0.0 - 12.0 %    Eosinophil % 1.0 0.0 - 7.0 %    Basophil % 0.4 0.0 - 2.0 %    Immature Grans % 0.2 0.0 - 0.5 %    Neutrophils, Absolute 2.71 2.00 - 8.60 10*3/mm3    Lymphocytes, Absolute 1.69 0.60 - 4.20 10*3/mm3    Monocytes, Absolute 0.42 0.00 - 0.90 10*3/mm3    Eosinophils, Absolute 0.05 0.00 - 0.70 10*3/mm3    Basophils, Absolute 0.02 0.00 - 0.20 10*3/mm3    Immature Grans, Absolute 0.01 0.00 - 0.02 10*3/mm3   Hepatitis Panel, Acute   Result Value Ref Range    Hepatitis C Ab Negative Negative    Hep A IgM Negative Negative    Hep B C IgM Negative Negative    Hepatitis B Surface Ag Negative Negative   TSH   Result Value Ref Range    TSH 0.360 (L) 0.460 - 4.680 mIU/mL   T4, free   Result Value Ref Range    Free T4 0.79 0.78 - 2.19 ng/dL   Magnesium   Result Value Ref Range    Magnesium 1.8 1.6 - 2.3 mg/dL   Vitamin B12   Result Value Ref Range    Vitamin B-12 358 239 - 931 pg/mL   Results for orders placed or performed in visit on 05/12/17   Microalbumin / Creatinine Urine Ratio   Result Value Ref Range    Microalbumin/Creatinine Ratio 7.4 0.0 - 30.0 mg/g    Creatinine, Urine 175.5 mg/dL    Microalbumin, Urine 1.3 mg/L   Hemoglobin A1c   Result Value Ref Range    Hemoglobin A1C 7.68 (H) 4 - 5.6 %   Lipid Panel   Result Value Ref Range    Total Cholesterol 127 0 - 199 mg/dL    Triglycerides 178 20 - 199 mg/dL    HDL Cholesterol 39 (L) 60 - 200 mg/dL    LDL Cholesterol  55 1 - 129 mg/dL    LDL/HDL Ratio 1.34 0.00 - 3.55   Comprehensive Metabolic Panel   Result Value Ref Range    Glucose 122 (H) 60 - 100 mg/dL    BUN 20 7 -  21 mg/dL    Creatinine 0.78 0.70 - 1.30 mg/dL    Sodium 145 137 - 145 mmol/L    Potassium 4.4 3.5 - 5.1 mmol/L    Chloride 106 95 - 110 mmol/L    CO2 27.0 22.0 - 31.0 mmol/L    Calcium 9.2 8.4 - 10.2 mg/dL    Total Protein 7.3 6.3 - 8.6 g/dL    Albumin 4.30 3.40 - 4.80 g/dL    ALT (SGPT) 36 21 - 72 U/L    AST (SGOT) 62 (H) 17 - 59 U/L    Alkaline Phosphatase 60 38 - 126 U/L    Total Bilirubin 0.5 0.2 - 1.3 mg/dL    eGFR Non  Amer 99 49 - 113 mL/min/1.73    Globulin 3.0 2.3 - 3.5 gm/dL    A/G Ratio 1.4 1.1 - 1.8 g/dL    BUN/Creatinine Ratio 25.6 (H) 7.0 - 25.0    Anion Gap 12.0 5.0 - 15.0 mmol/L   Results for orders placed or performed during the hospital encounter of 04/09/17   Lactate Acid, Reflex   Result Value Ref Range    Lactate 1.4 0.5 - 2.0 mmol/L   Gold Top - SST   Result Value Ref Range    Extra Tube Hold for add-ons.    CBC Auto Differential   Result Value Ref Range    WBC 5.77 3.20 - 9.80 10*3/mm3    RBC 3.74 (L) 4.37 - 5.74 10*6/mm3    Hemoglobin 12.0 (L) 13.7 - 17.3 g/dL    Hematocrit 33.4 (L) 39.0 - 49.0 %    MCV 89.3 80.0 - 98.0 fL    MCH 32.1 26.5 - 34.0 pg    MCHC 35.9 31.5 - 36.3 g/dL    RDW 12.0 11.5 - 14.5 %    RDW-SD 39.2 35.1 - 43.9 fl    MPV 10.1 8.0 - 12.0 fL    Platelets 141 (L) 150 - 450 10*3/mm3    Neutrophil % 63.5 37.0 - 80.0 %    Lymphocyte % 25.6 10.0 - 50.0 %    Monocyte % 9.4 0.0 - 12.0 %    Eosinophil % 0.9 0.0 - 7.0 %    Basophil % 0.3 0.0 - 2.0 %    Immature Grans % 0.3 0.0 - 0.5 %    Neutrophils, Absolute 3.66 2.00 - 8.60 10*3/mm3    Lymphocytes, Absolute 1.48 0.60 - 4.20 10*3/mm3    Monocytes, Absolute 0.54 0.00 - 0.90 10*3/mm3    Eosinophils, Absolute 0.05 0.00 - 0.70 10*3/mm3    Basophils, Absolute 0.02 0.00 - 0.20 10*3/mm3    Immature Grans, Absolute 0.02 0.00 - 0.02 10*3/mm3     *Note: Due to a large number of results and/or encounters for the requested time period, some results have not been displayed. A complete set of results can be found in Results Review.

## 2018-03-23 NOTE — INTERVAL H&P NOTE
Fully discussed cscope, alternative, risks and benefits and wishes to proceed.  Off plavix one week.    H&P reviewed. The patient was examined and there are no changes to the H&P.

## 2018-03-23 NOTE — ANESTHESIA PREPROCEDURE EVALUATION
Anesthesia Evaluation     NPO Solid Status: > 8 hours  NPO Liquid Status: > 8 hours           Airway   Mallampati: III  TM distance: >3 FB  Dental - normal exam     Pulmonary - normal exam   Cardiovascular - normal exam        Neuro/Psych  GI/Hepatic/Renal/Endo      Musculoskeletal     Abdominal    Substance History      OB/GYN          Other                        Anesthesia Plan    ASA 3     MAC     intravenous induction   Anesthetic plan and risks discussed with patient.

## 2018-03-26 LAB
LAB AP CASE REPORT: NORMAL
Lab: NORMAL
PATH REPORT.FINAL DX SPEC: NORMAL
PATH REPORT.GROSS SPEC: NORMAL

## 2018-04-02 ENCOUNTER — OFFICE VISIT (OUTPATIENT)
Dept: SURGERY | Facility: CLINIC | Age: 68
End: 2018-04-02

## 2018-04-02 ENCOUNTER — CLINICAL SUPPORT (OUTPATIENT)
Dept: FAMILY MEDICINE CLINIC | Facility: CLINIC | Age: 68
End: 2018-04-02

## 2018-04-02 VITALS
HEIGHT: 72 IN | DIASTOLIC BLOOD PRESSURE: 70 MMHG | BODY MASS INDEX: 30.88 KG/M2 | SYSTOLIC BLOOD PRESSURE: 142 MMHG | WEIGHT: 228 LBS

## 2018-04-02 DIAGNOSIS — Z12.11 ENCOUNTER FOR COLONOSCOPY DUE TO HISTORY OF ADENOMATOUS COLONIC POLYPS: Primary | ICD-10-CM

## 2018-04-02 DIAGNOSIS — D51.0 PERNICIOUS ANEMIA: Primary | ICD-10-CM

## 2018-04-02 DIAGNOSIS — Z86.010 ENCOUNTER FOR COLONOSCOPY DUE TO HISTORY OF ADENOMATOUS COLONIC POLYPS: Primary | ICD-10-CM

## 2018-04-02 PROCEDURE — 99212 OFFICE O/P EST SF 10 MIN: CPT | Performed by: SURGERY

## 2018-04-02 PROCEDURE — 96372 THER/PROPH/DIAG INJ SC/IM: CPT | Performed by: INTERNAL MEDICINE

## 2018-04-02 RX ADMIN — CYANOCOBALAMIN 1000 MCG: 1000 INJECTION, SOLUTION INTRAMUSCULAR; SUBCUTANEOUS at 10:58

## 2018-04-02 NOTE — PROGRESS NOTES
Patient returns with his wife follow-up after his recent colonoscopy.  He did well with the procedure.  He's had adenomatous polyps in the past and his last colonoscopy was 5 years previously.  His prep was adequate to identify a large polyps.  He had total of 9 tumor adenomatous polyps removed from his colon.  Largest polyp was up to 8 mm.  I would recommend he have a repeat colonoscopy in one year or sooner if is in the concerns or questions.  He'll return in 1 year to set up the colonoscopy

## 2018-04-02 NOTE — PATIENT INSTRUCTIONS

## 2018-04-11 ENCOUNTER — TELEPHONE (OUTPATIENT)
Dept: FAMILY MEDICINE CLINIC | Facility: CLINIC | Age: 68
End: 2018-04-11

## 2018-04-11 DIAGNOSIS — E11.9 TYPE 2 DIABETES MELLITUS WITHOUT COMPLICATION, UNSPECIFIED LONG TERM INSULIN USE STATUS: ICD-10-CM

## 2018-04-11 RX ORDER — INSULIN GLARGINE 100 [IU]/ML
176 INJECTION, SOLUTION SUBCUTANEOUS NIGHTLY
Refills: 5 | OUTPATIENT
Start: 2018-04-11

## 2018-04-11 RX ORDER — INSULIN GLARGINE 100 [IU]/ML
186 INJECTION, SOLUTION SUBCUTANEOUS NIGHTLY
Qty: 50 ML | Refills: 5 | Status: SHIPPED | OUTPATIENT
Start: 2018-04-11 | End: 2018-08-23

## 2018-04-11 NOTE — TELEPHONE ENCOUNTER
Talia-wife called for him- said he needs new pres for his lantus sent to [facility] in [place]. He sees Dr Hawkins and can be reached at [phone] if any questions.

## 2018-04-16 ENCOUNTER — TELEPHONE (OUTPATIENT)
Dept: FAMILY MEDICINE CLINIC | Facility: CLINIC | Age: 68
End: 2018-04-16

## 2018-04-16 RX ORDER — OMEPRAZOLE 40 MG/1
40 CAPSULE, DELAYED RELEASE ORAL DAILY
Qty: 30 CAPSULE | Refills: 5 | Status: SHIPPED | OUTPATIENT
Start: 2018-04-16 | End: 2018-10-15 | Stop reason: SDUPTHER

## 2018-05-03 ENCOUNTER — TELEPHONE (OUTPATIENT)
Dept: FAMILY MEDICINE CLINIC | Facility: CLINIC | Age: 68
End: 2018-05-03

## 2018-05-03 NOTE — TELEPHONE ENCOUNTER
Patient needs refills sent to [facility]. SITagliptin (JANUVIA) 100 MG tablet

## 2018-05-04 ENCOUNTER — CLINICAL SUPPORT (OUTPATIENT)
Dept: FAMILY MEDICINE CLINIC | Facility: CLINIC | Age: 68
End: 2018-05-04

## 2018-05-04 DIAGNOSIS — D51.0 PERNICIOUS ANEMIA: Primary | ICD-10-CM

## 2018-05-04 PROCEDURE — 96372 THER/PROPH/DIAG INJ SC/IM: CPT | Performed by: INTERNAL MEDICINE

## 2018-05-04 RX ADMIN — CYANOCOBALAMIN 1000 MCG: 1000 INJECTION, SOLUTION INTRAMUSCULAR; SUBCUTANEOUS at 15:22

## 2018-05-16 ENCOUNTER — TELEPHONE (OUTPATIENT)
Dept: FAMILY MEDICINE CLINIC | Facility: CLINIC | Age: 68
End: 2018-05-16

## 2018-05-17 ENCOUNTER — TELEPHONE (OUTPATIENT)
Dept: FAMILY MEDICINE CLINIC | Facility: CLINIC | Age: 68
End: 2018-05-17

## 2018-05-17 RX ORDER — ATENOLOL 100 MG/1
100 TABLET ORAL DAILY
Qty: 90 TABLET | Refills: 3 | Status: SHIPPED | OUTPATIENT
Start: 2018-05-17 | End: 2019-02-12 | Stop reason: SDUPTHER

## 2018-05-21 ENCOUNTER — TELEPHONE (OUTPATIENT)
Dept: FAMILY MEDICINE CLINIC | Facility: CLINIC | Age: 68
End: 2018-05-21

## 2018-05-21 RX ORDER — SPIRONOLACTONE 25 MG/1
25 TABLET ORAL DAILY
Qty: 90 TABLET | Refills: 3 | Status: SHIPPED | OUTPATIENT
Start: 2018-05-21 | End: 2019-05-09 | Stop reason: SDUPTHER

## 2018-05-21 NOTE — TELEPHONE ENCOUNTER
Patient has called and said that he called last week for a refill on his spironolactone (ALDACTONE) 25 MG tablet but it was never called in. Can you resend that RX to [facility].

## 2018-05-24 ENCOUNTER — OFFICE VISIT (OUTPATIENT)
Dept: FAMILY MEDICINE CLINIC | Facility: CLINIC | Age: 68
End: 2018-05-24

## 2018-05-24 ENCOUNTER — APPOINTMENT (OUTPATIENT)
Dept: LAB | Facility: HOSPITAL | Age: 68
End: 2018-05-24

## 2018-05-24 VITALS
SYSTOLIC BLOOD PRESSURE: 150 MMHG | BODY MASS INDEX: 30.11 KG/M2 | DIASTOLIC BLOOD PRESSURE: 80 MMHG | HEIGHT: 72 IN | HEART RATE: 59 BPM | WEIGHT: 222.3 LBS | OXYGEN SATURATION: 98 %

## 2018-05-24 DIAGNOSIS — E11.65 UNCONTROLLED TYPE 2 DIABETES MELLITUS WITH HYPERGLYCEMIA, WITH LONG-TERM CURRENT USE OF INSULIN (HCC): ICD-10-CM

## 2018-05-24 DIAGNOSIS — E78.00 HYPERCHOLESTEROLEMIA: ICD-10-CM

## 2018-05-24 DIAGNOSIS — Z00.00 MEDICARE ANNUAL WELLNESS VISIT, INITIAL: Primary | ICD-10-CM

## 2018-05-24 DIAGNOSIS — I25.10 ATHEROSCLEROSIS OF NATIVE CORONARY ARTERY OF NATIVE HEART WITHOUT ANGINA PECTORIS: ICD-10-CM

## 2018-05-24 DIAGNOSIS — Z79.4 UNCONTROLLED TYPE 2 DIABETES MELLITUS WITH HYPERGLYCEMIA, WITH LONG-TERM CURRENT USE OF INSULIN (HCC): ICD-10-CM

## 2018-05-24 DIAGNOSIS — K21.9 GASTROESOPHAGEAL REFLUX DISEASE WITHOUT ESOPHAGITIS: ICD-10-CM

## 2018-05-24 DIAGNOSIS — I10 ESSENTIAL HYPERTENSION: ICD-10-CM

## 2018-05-24 LAB
ALBUMIN SERPL-MCNC: 4.4 G/DL (ref 3.4–4.8)
ALBUMIN UR-MCNC: 1.1 MG/L
ALBUMIN/GLOB SERPL: 1.4 G/DL (ref 1.1–1.8)
ALP SERPL-CCNC: 69 U/L (ref 38–126)
ALT SERPL W P-5'-P-CCNC: 37 U/L (ref 21–72)
ANION GAP SERPL CALCULATED.3IONS-SCNC: 12 MMOL/L (ref 5–15)
ARTICHOKE IGE QN: 62 MG/DL (ref 1–129)
AST SERPL-CCNC: 58 U/L (ref 17–59)
BILIRUB SERPL-MCNC: 0.3 MG/DL (ref 0.2–1.3)
BUN BLD-MCNC: 23 MG/DL (ref 7–21)
BUN/CREAT SERPL: 26.7 (ref 7–25)
CALCIUM SPEC-SCNC: 9.4 MG/DL (ref 8.4–10.2)
CHLORIDE SERPL-SCNC: 102 MMOL/L (ref 95–110)
CHOLEST SERPL-MCNC: 147 MG/DL (ref 0–199)
CO2 SERPL-SCNC: 29 MMOL/L (ref 22–31)
CREAT BLD-MCNC: 0.86 MG/DL (ref 0.7–1.3)
CREAT UR-MCNC: 146.2 MG/DL
GFR SERPL CREATININE-BSD FRML MDRD: 88 ML/MIN/1.73 (ref 49–113)
GLOBULIN UR ELPH-MCNC: 3.1 GM/DL (ref 2.3–3.5)
GLUCOSE BLD-MCNC: 148 MG/DL (ref 60–100)
HBA1C MFR BLD: 8.1 % (ref 4–5.6)
HDLC SERPL-MCNC: 41 MG/DL (ref 60–200)
LDLC/HDLC SERPL: 1.4 {RATIO} (ref 0–3.55)
MAGNESIUM SERPL-MCNC: 2.1 MG/DL (ref 1.6–2.3)
MICROALBUMIN/CREAT UR: 7.5 MG/G (ref 0–30)
POTASSIUM BLD-SCNC: 4.8 MMOL/L (ref 3.5–5.1)
PROT SERPL-MCNC: 7.5 G/DL (ref 6.3–8.6)
SODIUM BLD-SCNC: 143 MMOL/L (ref 137–145)
TRIGL SERPL-MCNC: 242 MG/DL (ref 20–199)
VIT B12 BLD-MCNC: 337 PG/ML (ref 239–931)

## 2018-05-24 PROCEDURE — 82043 UR ALBUMIN QUANTITATIVE: CPT | Performed by: FAMILY MEDICINE

## 2018-05-24 PROCEDURE — 83036 HEMOGLOBIN GLYCOSYLATED A1C: CPT | Performed by: FAMILY MEDICINE

## 2018-05-24 PROCEDURE — G0438 PPPS, INITIAL VISIT: HCPCS | Performed by: FAMILY MEDICINE

## 2018-05-24 PROCEDURE — 82607 VITAMIN B-12: CPT | Performed by: FAMILY MEDICINE

## 2018-05-24 PROCEDURE — 80053 COMPREHEN METABOLIC PANEL: CPT | Performed by: FAMILY MEDICINE

## 2018-05-24 PROCEDURE — 36415 COLL VENOUS BLD VENIPUNCTURE: CPT | Performed by: FAMILY MEDICINE

## 2018-05-24 PROCEDURE — 83735 ASSAY OF MAGNESIUM: CPT | Performed by: FAMILY MEDICINE

## 2018-05-24 PROCEDURE — 82570 ASSAY OF URINE CREATININE: CPT | Performed by: FAMILY MEDICINE

## 2018-05-24 PROCEDURE — 99214 OFFICE O/P EST MOD 30 MIN: CPT | Performed by: FAMILY MEDICINE

## 2018-05-24 PROCEDURE — 80061 LIPID PANEL: CPT | Performed by: FAMILY MEDICINE

## 2018-05-24 NOTE — PROGRESS NOTES
Subjective   Beka Zavaleta is a 67 y.o. male.     Hypertension   This is a chronic problem. The current episode started more than 1 year ago. The problem has been waxing and waning since onset. The problem is controlled. Pertinent negatives include no chest pain, orthopnea, palpitations, peripheral edema, PND or shortness of breath.   Diabetes   He presents for his follow-up diabetic visit. He has type 2 diabetes mellitus. His disease course has been fluctuating. Pertinent negatives for hypoglycemia include no confusion or hunger. Pertinent negatives for diabetes include no chest pain, no foot paresthesias, no foot ulcerations, no polydipsia and no polyuria. His breakfast blood glucose is taken between 7-8 am. His breakfast blood glucose range is generally 140-180 mg/dl. (  ) An ACE inhibitor/angiotensin II receptor blocker is being taken. Eye exam is current.   Coronary Artery Disease   Presents for follow-up visit. Pertinent negatives include no chest pain, chest pressure, chest tightness, leg swelling, palpitations, shortness of breath or weight gain. Risk factors include hyperlipidemia.   Heartburn   He reports no chest pain, no dysphagia or no heartburn.   Hyperlipidemia   This is a chronic problem. The current episode started more than 1 year ago. The problem is controlled. Recent lipid tests were reviewed and are normal. Pertinent negatives include no chest pain, myalgias or shortness of breath.        The following portions of the patient's history were reviewed and updated as appropriate: allergies, current medications, past family history, past medical history, past social history, past surgical history and problem list.    Review of Systems   Constitutional: Negative for weight gain.   Respiratory: Negative for chest tightness and shortness of breath.    Cardiovascular: Negative for chest pain, palpitations, orthopnea, leg swelling and PND.   Gastrointestinal: Negative for dysphagia and heartburn.    Endocrine: Negative for polydipsia and polyuria.   Musculoskeletal: Negative for myalgias.   Psychiatric/Behavioral: Negative for confusion.       Objective   Physical Exam   Constitutional: He is oriented to person, place, and time. He appears well-developed and well-nourished. No distress.   HENT:   Head: Normocephalic and atraumatic.   Cardiovascular: Normal rate, regular rhythm, normal heart sounds and intact distal pulses.  Exam reveals no gallop and no friction rub.    No murmur heard.  Pulmonary/Chest: Effort normal and breath sounds normal. No respiratory distress. He has no wheezes. He has no rales. He exhibits no tenderness.   Abdominal: Soft. Normal appearance. There is no tenderness.   Musculoskeletal: He exhibits no edema.    Beka had a diabetic foot exam performed (callus noted) today.   During the foot exam he had a monofilament test performed (normal).  Vascular Status -  His right foot exhibits normal foot vasculature  and no edema. His left foot exhibits normal foot vasculature  and no edema.  Neurological: He is alert and oriented to person, place, and time.   Skin: Skin is warm and dry. He is not diaphoretic.   Psychiatric: He has a normal mood and affect. His behavior is normal. Judgment and thought content normal.   Nursing note and vitals reviewed.      Assessment/Plan   Problems Addressed this Visit        Cardiovascular and Mediastinum    Hypercholesterolemia    Relevant Orders    Lipid Panel    Essential hypertension    Relevant Orders    Comprehensive Metabolic Panel    Coronary atherosclerosis       Digestive    GERD (gastroesophageal reflux disease)    Relevant Orders    Vitamin B12    Magnesium       Endocrine    Type II diabetes mellitus, uncontrolled    Relevant Orders    Hemoglobin A1c    Microalbumin / Creatinine Urine Ratio - Urine, Clean Catch      Other Visit Diagnoses     Medicare annual wellness visit, initial    -  Primary

## 2018-05-24 NOTE — PATIENT INSTRUCTIONS

## 2018-05-24 NOTE — PROGRESS NOTES
QUICK REFERENCE INFORMATION:  The ABCs of the Annual Wellness Visit    Initial Medicare Wellness Visit    HEALTH RISK ASSESSMENT    1950    Recent Hospitalizations:  Recently treated at the following:  [facility].        Current Medical Providers:  Patient Care Team:  Fede Hawkins MD as PCP - General (Family Medicine)  Riley Osorio MD as Surgeon (General Surgery)  Mario Smart MD as Surgeon (Orthopedic Surgery)        Smoking Status:  History   Smoking Status   • Former Smoker   • Types: Cigarettes   • Quit date: 1/1/1988   Smokeless Tobacco   • Former User   • Types: Chew       Alcohol Consumption:  History   Alcohol Use No       Depression Screen:   PHQ-2/PHQ-9 Depression Screening 5/24/2018   Little interest or pleasure in doing things 0   Feeling down, depressed, or hopeless 0   Trouble falling or staying asleep, or sleeping too much 1   Feeling tired or having little energy 3   Poor appetite or overeating 1   Feeling bad about yourself - or that you are a failure or have let yourself or your family down 0   Trouble concentrating on things, such as reading the newspaper or watching television 0   Moving or speaking so slowly that other people could have noticed. Or the opposite - being so fidgety or restless that you have been moving around a lot more than usual 0   Thoughts that you would be better off dead, or of hurting yourself in some way 0   Total Score 5   If you checked off any problems, how difficult have these problems made it for you to do your work, take care of things at home, or get along with other people? Not difficult at all       Health Habits and Functional and Cognitive Screening:  Functional & Cognitive Status 5/24/2018   Do you have difficulty preparing food and eating? No   Do you have difficulty bathing yourself, getting dressed or grooming yourself? No   Do you have difficulty using the toilet? No   Do you have difficulty moving around from place  to place? No   Do you have trouble with steps or getting out of a bed or a chair? No   In the past year have you fallen or experienced a near fall? No   Current Diet Diabetic Diet   Dental Exam Up to date   Eye Exam Up to date   Exercise (times per week) 0 times per week   Do you need help using the phone?  No   Are you deaf or do you have serious difficulty hearing?  Yes   Do you need help with transportation? No   Do you need help shopping? No   Do you need help preparing meals?  No   Do you need help with housework?  No   Do you need help with laundry? No   Do you need help taking your medications? No   Do you need help managing money? No   Do you ever drive or ride in a car without wearing a seat belt? No   Have you felt unusual stress, anger or loneliness in the last month? No   Who do you live with? Spouse   If you need help, do you have trouble finding someone available to you? No   Do you have difficulty concentrating, remembering or making decisions? No           Does the patient have evidence of cognitive impairment? No    Asiprin use counseling: Taking ASA appropriately as indicated      Recent Lab Results:    Visual Acuity:  No exam data present    Age-appropriate Screening Schedule:  Refer to the list below for future screening recommendations based on patient's age, sex and/or medical conditions. Orders for these recommended tests are listed in the plan section. The patient has been provided with a written plan.    Health Maintenance   Topic Date Due   • ZOSTER VACCINE (2 of 3) 03/23/2009   • DIABETIC EYE EXAM  10/25/2017   • HEMOGLOBIN A1C  05/10/2018   • INFLUENZA VACCINE  08/01/2018   • LIPID PANEL  11/10/2018   • URINE MICROALBUMIN  11/10/2018   • DIABETIC FOOT EXAM  02/22/2019   • TDAP/TD VACCINES (2 - Td) 04/28/2020   • COLONOSCOPY  03/23/2028   • PNEUMOCOCCAL VACCINES (65+ LOW/MEDIUM RISK)  Completed        Subjective   History of Present Illness    Beka Zavaleta is a 67 y.o. male who presents  "for an Annual Wellness Visit.    The following portions of the patient's history were reviewed and updated as appropriate: allergies, current medications, past family history, past medical history, past social history, past surgical history and problem list.    Outpatient Medications Prior to Visit   Medication Sig Dispense Refill   • amLODIPine (NORVASC) 10 MG tablet Take 1 tablet by mouth Daily. 90 tablet 3   • aspirin 81 MG tablet Take 81 mg by mouth daily.     • atenolol (TENORMIN) 100 MG tablet Take 1 tablet by mouth Daily. 90 tablet 3   • atorvastatin (LIPITOR) 80 MG tablet Take 1 tablet by mouth Daily. 90 tablet 3   • carBAMazepine XR (TEGretol  XR) 100 MG 12 hr tablet Take 4 tablets in the Morning, 3 tablets at Lunch and 3 tablets at Bedtime 300 tablet 5   • clopidogrel (PLAVIX) 75 MG tablet Take 1 tablet by mouth Daily. 30 tablet 0   • colestipol (COLESTID) 1 G tablet TAKE TWO TABLETS BY MOUTH TWICE DAILY 360 tablet 3   • doxazosin (CARDURA) 4 MG tablet Take 1 tablet by mouth Every Night. 90 tablet 3   • glucose blood (ACCU-CHEK ENDER) test strip Test sugars up to 4 times daily as directed by Physician.     • insulin glargine (LANTUS) 100 UNIT/ML injection Inject 186 Units under the skin Every Night. (Dosage Increased due to being on Prednisone) 50 mL 5   • Insulin Syringe-Needle U-100 30G X 7/16\" 1 ML misc daily.     • isosorbide mononitrate (IMDUR) 30 MG 24 hr tablet Take 1 tablet by mouth Daily. Take 1/2 pill once a day (Patient taking differently: Take 30 mg by mouth Daily.) 30 tablet 5   • lisinopril (PRINIVIL,ZESTRIL) 40 MG tablet Take 1 tablet by mouth 2 (Two) Times a Day. 60 tablet 5   • magnesium oxide (MAGOX) 400 (241.3 MG) MG tablet tablet Take 400 mg by mouth Daily.     • metFORMIN (GLUCOPHAGE) 500 MG tablet Take 1 tablet by mouth 2 (Two) Times a Day With Meals. 180 tablet 3   • Multiple Vitamins-Minerals (ICAPS PO) Take  by mouth daily.     • nitroglycerin (NITROSTAT) 0.4 MG SL tablet Place 0.4 " mg under the tongue as needed for chest pain. repeat X 1 in 5 min. if not relieved and then got to ER or call an ambulance     • omeprazole (priLOSEC) 40 MG capsule Take 1 capsule by mouth Daily. 30 capsule 5   • RELION ULTRA THIN PLUS LANCETS misc test once daily     • SITagliptin (JANUVIA) 100 MG tablet Take 1 tablet by mouth Daily. 30 tablet 5   • spironolactone (ALDACTONE) 25 MG tablet Take 1 tablet by mouth Daily. 90 tablet 3   • benzonatate (TESSALON) 200 MG capsule Take 1 capsule by mouth 3 (Three) Times a Day As Needed for Cough. 30 capsule 0   • cefuroxime (CEFTIN) 500 MG tablet Take 1 tablet by mouth 2 (Two) Times a Day. 20 tablet 0     Facility-Administered Medications Prior to Visit   Medication Dose Route Frequency Provider Last Rate Last Dose   • cyanocobalamin injection 1,000 mcg  1,000 mcg Intramuscular Q28 Days Sundeep Castillo MD   1,000 mcg at 05/04/18 1522   • meloxicam (MOBIC) tablet 15 mg  15 mg Oral Daily Mario Smart MD           Patient Active Problem List   Diagnosis   • Type II diabetes mellitus, uncontrolled   • Obstructive sleep apnea syndrome   • Impotence   • Hypercholesterolemia   • History of trigeminal neuralgia   • History of colon polyps 03/23/2018   • GERD (gastroesophageal reflux disease)   • Essential hypertension   • Coronary atherosclerosis   • Cobalamin deficiency   • 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, mild stage   • Hepatic steatosis   • Nuclear cataract   • Cortical age-related cataract   • Palpitation   • Cellulitis of right hand   • Cellulitis of finger of right hand   • Vitamin deficiency   • Type 2 diabetes mellitus   • Plantar fasciitis   • Malaise and fatigue   • Hyperkalemia   • History of Holter monitoring   • History of echocardiogram   • Goiter   • Encounter for screening for malignant neoplasm of prostate   • Diverticular disease of colon   • Chronic left shoulder pain   • Type 2 diabetes mellitus without complication, without  "long-term current use of insulin   • Rotator cuff syndrome   • Impingement syndrome of left shoulder   • Encounter for colonoscopy due to history of adenomatous colonic polyps       Advance Care Planning:  has NO advance directive - information provided to the patient today    Identification of Risk Factors:  Risk factors include: weight  and unhealthy diet.    Review of Systems    Compared to one year ago, the patient feels his physical health is the same.  Compared to one year ago, the patient feels his mental health is the same.    Objective     Physical Exam    Vitals:    05/24/18 0801   BP: 150/80   BP Location: Left arm   Patient Position: Sitting   Cuff Size: Adult   Pulse: 59   SpO2: 98%   Weight: 101 kg (222 lb 4.8 oz)   Height: 182.9 cm (72\")   PainSc: 0-No pain       Patient's Body mass index is 30.15 kg/m². BMI is above normal parameters. Recommendations include: educational material.      Assessment/Plan   Patient Self-Management and Personalized Health Advice  The patient has been provided with information about: diet and exercise and preventive services including:   · Advance directive.    Visit Diagnoses:  No diagnosis found.    No orders of the defined types were placed in this encounter.      Outpatient Encounter Prescriptions as of 5/24/2018   Medication Sig Dispense Refill   • amLODIPine (NORVASC) 10 MG tablet Take 1 tablet by mouth Daily. 90 tablet 3   • aspirin 81 MG tablet Take 81 mg by mouth daily.     • atenolol (TENORMIN) 100 MG tablet Take 1 tablet by mouth Daily. 90 tablet 3   • atorvastatin (LIPITOR) 80 MG tablet Take 1 tablet by mouth Daily. 90 tablet 3   • carBAMazepine XR (TEGretol  XR) 100 MG 12 hr tablet Take 4 tablets in the Morning, 3 tablets at Lunch and 3 tablets at Bedtime 300 tablet 5   • clopidogrel (PLAVIX) 75 MG tablet Take 1 tablet by mouth Daily. 30 tablet 0   • colestipol (COLESTID) 1 G tablet TAKE TWO TABLETS BY MOUTH TWICE DAILY 360 tablet 3   • doxazosin (CARDURA) 4 " "MG tablet Take 1 tablet by mouth Every Night. 90 tablet 3   • glucose blood (ACCU-CHEK ENDER) test strip Test sugars up to 4 times daily as directed by Physician.     • insulin glargine (LANTUS) 100 UNIT/ML injection Inject 186 Units under the skin Every Night. (Dosage Increased due to being on Prednisone) 50 mL 5   • Insulin Syringe-Needle U-100 30G X 7/16\" 1 ML misc daily.     • isosorbide mononitrate (IMDUR) 30 MG 24 hr tablet Take 1 tablet by mouth Daily. Take 1/2 pill once a day (Patient taking differently: Take 30 mg by mouth Daily.) 30 tablet 5   • lisinopril (PRINIVIL,ZESTRIL) 40 MG tablet Take 1 tablet by mouth 2 (Two) Times a Day. 60 tablet 5   • magnesium oxide (MAGOX) 400 (241.3 MG) MG tablet tablet Take 400 mg by mouth Daily.     • metFORMIN (GLUCOPHAGE) 500 MG tablet Take 1 tablet by mouth 2 (Two) Times a Day With Meals. 180 tablet 3   • Multiple Vitamins-Minerals (ICAPS PO) Take  by mouth daily.     • nitroglycerin (NITROSTAT) 0.4 MG SL tablet Place 0.4 mg under the tongue as needed for chest pain. repeat X 1 in 5 min. if not relieved and then got to ER or call an ambulance     • omeprazole (priLOSEC) 40 MG capsule Take 1 capsule by mouth Daily. 30 capsule 5   • RELION ULTRA THIN PLUS LANCETS misc test once daily     • SITagliptin (JANUVIA) 100 MG tablet Take 1 tablet by mouth Daily. 30 tablet 5   • spironolactone (ALDACTONE) 25 MG tablet Take 1 tablet by mouth Daily. 90 tablet 3   • [DISCONTINUED] benzonatate (TESSALON) 200 MG capsule Take 1 capsule by mouth 3 (Three) Times a Day As Needed for Cough. 30 capsule 0   • [DISCONTINUED] cefuroxime (CEFTIN) 500 MG tablet Take 1 tablet by mouth 2 (Two) Times a Day. 20 tablet 0     Facility-Administered Encounter Medications as of 5/24/2018   Medication Dose Route Frequency Provider Last Rate Last Dose   • cyanocobalamin injection 1,000 mcg  1,000 mcg Intramuscular Q28 Days Sundeep Castillo MD   1,000 mcg at 05/04/18 1522   • meloxicam (MOBIC) tablet 15 mg  " 15 mg Oral Daily Mario Smart MD           Reviewed use of high risk medication in the elderly: yes  Reviewed for potential of harmful drug interactions in the elderly: yes    Follow Up:  No Follow-up on file.     An After Visit Summary and PPPS with all of these plans were given to the patient.

## 2018-05-29 NOTE — PROGRESS NOTES
His hemoglobin A1c is elevated again at 8.1.  Confirm the is not missed any of his nightly insulin doses.  Have him get a 7 AM 4 PM fingerstick glucose for 2 days and call the results.

## 2018-05-31 ENCOUNTER — TELEPHONE (OUTPATIENT)
Dept: FAMILY MEDICINE CLINIC | Facility: CLINIC | Age: 68
End: 2018-05-31

## 2018-05-31 NOTE — PROGRESS NOTES
Pr Dr. Hawkins, Mr. Zavaleta  has been called with his recent lab results & recommendations.  Continue his current medications and follow-up as planned or sooner if any problems.    Patient Response:  He states he takes his Insulin in the AM, he sates she has not missed any of his insulin.  He will call us Monday with his home glucose monitoring results

## 2018-05-31 NOTE — TELEPHONE ENCOUNTER
----- Message from Fede Hawkins MD sent at 5/29/2018  4:02 PM CDT -----  His hemoglobin A1c is elevated again at 8.1.  Confirm the is not missed any of his nightly insulin doses.  Have him get a 7 AM 4 PM fingerstick glucose for 2 days and call the results.

## 2018-06-04 ENCOUNTER — CLINICAL SUPPORT (OUTPATIENT)
Dept: FAMILY MEDICINE CLINIC | Facility: CLINIC | Age: 68
End: 2018-06-04

## 2018-06-04 ENCOUNTER — TELEPHONE (OUTPATIENT)
Dept: FAMILY MEDICINE CLINIC | Facility: CLINIC | Age: 68
End: 2018-06-04

## 2018-06-04 DIAGNOSIS — E53.8 VITAMIN B 12 DEFICIENCY: Primary | ICD-10-CM

## 2018-06-04 PROCEDURE — 96372 THER/PROPH/DIAG INJ SC/IM: CPT | Performed by: INTERNAL MEDICINE

## 2018-06-04 RX ADMIN — CYANOCOBALAMIN 1000 MCG: 1000 INJECTION, SOLUTION INTRAMUSCULAR; SUBCUTANEOUS at 08:53

## 2018-07-03 RX ORDER — LISINOPRIL 40 MG/1
TABLET ORAL
Qty: 60 TABLET | Refills: 5 | Status: SHIPPED | OUTPATIENT
Start: 2018-07-03 | End: 2018-07-05 | Stop reason: SDUPTHER

## 2018-07-05 RX ORDER — LISINOPRIL 40 MG/1
40 TABLET ORAL 2 TIMES DAILY
Qty: 180 TABLET | Refills: 1 | Status: SHIPPED | OUTPATIENT
Start: 2018-07-05 | End: 2019-01-07 | Stop reason: SDUPTHER

## 2018-07-06 ENCOUNTER — CLINICAL SUPPORT (OUTPATIENT)
Dept: FAMILY MEDICINE CLINIC | Facility: CLINIC | Age: 68
End: 2018-07-06

## 2018-07-06 DIAGNOSIS — D51.0 PERNICIOUS ANEMIA: Primary | ICD-10-CM

## 2018-07-06 PROCEDURE — 96372 THER/PROPH/DIAG INJ SC/IM: CPT | Performed by: INTERNAL MEDICINE

## 2018-07-06 RX ADMIN — CYANOCOBALAMIN 1000 MCG: 1000 INJECTION, SOLUTION INTRAMUSCULAR; SUBCUTANEOUS at 11:39

## 2018-07-12 ENCOUNTER — TELEPHONE (OUTPATIENT)
Dept: FAMILY MEDICINE CLINIC | Facility: CLINIC | Age: 68
End: 2018-07-12

## 2018-07-12 RX ORDER — CARBAMAZEPINE 100 MG/1
TABLET, EXTENDED RELEASE ORAL
Qty: 300 TABLET | Refills: 5 | Status: SHIPPED | OUTPATIENT
Start: 2018-07-12 | End: 2018-12-31 | Stop reason: SDUPTHER

## 2018-07-12 NOTE — TELEPHONE ENCOUNTER
PT IS NEEDING REFILLS SENT TO [facility]. carBAMazepine XR (TEGretol XR) 100 MG 12 hr tablet

## 2018-07-20 ENCOUNTER — TELEPHONE (OUTPATIENT)
Dept: FAMILY MEDICINE CLINIC | Facility: CLINIC | Age: 68
End: 2018-07-20

## 2018-07-20 RX ORDER — MONTELUKAST SODIUM 4 MG/1
2 TABLET, CHEWABLE ORAL 2 TIMES DAILY
Qty: 360 TABLET | Refills: 3 | Status: SHIPPED | OUTPATIENT
Start: 2018-07-20 | End: 2019-07-03 | Stop reason: SDUPTHER

## 2018-07-29 ENCOUNTER — HOSPITAL ENCOUNTER (EMERGENCY)
Facility: HOSPITAL | Age: 68
Discharge: HOME | End: 2018-07-29
Attending: EMERGENCY MEDICINE | Admitting: EMERGENCY MEDICINE

## 2018-07-29 ENCOUNTER — APPOINTMENT (OUTPATIENT)
Dept: GENERAL RADIOLOGY | Facility: HOSPITAL | Age: 68
End: 2018-07-29

## 2018-07-29 VITALS
RESPIRATION RATE: 18 BRPM | HEART RATE: 57 BPM | WEIGHT: 230 LBS | OXYGEN SATURATION: 97 % | TEMPERATURE: 97.6 F | BODY MASS INDEX: 31.15 KG/M2 | DIASTOLIC BLOOD PRESSURE: 78 MMHG | HEIGHT: 72 IN | SYSTOLIC BLOOD PRESSURE: 160 MMHG

## 2018-07-29 DIAGNOSIS — S29.012A MUSCLE STRAIN OF RIGHT UPPER BACK, INITIAL ENCOUNTER: Primary | ICD-10-CM

## 2018-07-29 DIAGNOSIS — M51.34 DEGENERATIVE DISC DISEASE, THORACIC: ICD-10-CM

## 2018-07-29 DIAGNOSIS — M51.36 DEGENERATIVE DISC DISEASE, LUMBAR: ICD-10-CM

## 2018-07-29 LAB
BILIRUB UR QL STRIP: NEGATIVE
CLARITY UR: CLEAR
COLOR UR: YELLOW
GLUCOSE UR STRIP-MCNC: ABNORMAL MG/DL
HGB UR QL STRIP.AUTO: NEGATIVE
KETONES UR QL STRIP: NEGATIVE
LEUKOCYTE ESTERASE UR QL STRIP.AUTO: NEGATIVE
NITRITE UR QL STRIP: NEGATIVE
PH UR STRIP.AUTO: 6 [PH] (ref 5–9)
PROT UR QL STRIP: NEGATIVE
SP GR UR STRIP: 1.02 (ref 1–1.03)
UROBILINOGEN UR QL STRIP: ABNORMAL

## 2018-07-29 PROCEDURE — 96372 THER/PROPH/DIAG INJ SC/IM: CPT

## 2018-07-29 PROCEDURE — 81003 URINALYSIS AUTO W/O SCOPE: CPT | Performed by: PHYSICIAN ASSISTANT

## 2018-07-29 PROCEDURE — 72072 X-RAY EXAM THORAC SPINE 3VWS: CPT

## 2018-07-29 PROCEDURE — 99283 EMERGENCY DEPT VISIT LOW MDM: CPT

## 2018-07-29 PROCEDURE — 72100 X-RAY EXAM L-S SPINE 2/3 VWS: CPT

## 2018-07-29 PROCEDURE — 25010000002 ORPHENADRINE CITRATE PER 60 MG: Performed by: PHYSICIAN ASSISTANT

## 2018-07-29 RX ORDER — BACLOFEN 10 MG/1
10 TABLET ORAL 3 TIMES DAILY
Qty: 30 TABLET | Refills: 0 | Status: SHIPPED | OUTPATIENT
Start: 2018-07-29 | End: 2018-08-08

## 2018-07-29 RX ORDER — ETODOLAC 400 MG/1
400 TABLET, EXTENDED RELEASE ORAL DAILY
Qty: 10 TABLET | Refills: 0 | Status: SHIPPED | OUTPATIENT
Start: 2018-07-29 | End: 2018-08-08

## 2018-07-29 RX ORDER — ORPHENADRINE CITRATE 30 MG/ML
60 INJECTION INTRAMUSCULAR; INTRAVENOUS ONCE
Status: COMPLETED | OUTPATIENT
Start: 2018-07-29 | End: 2018-07-29

## 2018-07-29 RX ADMIN — ORPHENADRINE CITRATE 60 MG: 60 INJECTION INTRAMUSCULAR; INTRAVENOUS at 09:23

## 2018-08-06 ENCOUNTER — CLINICAL SUPPORT (OUTPATIENT)
Dept: FAMILY MEDICINE CLINIC | Facility: CLINIC | Age: 68
End: 2018-08-06

## 2018-08-06 DIAGNOSIS — E53.8 VITAMIN B 12 DEFICIENCY: ICD-10-CM

## 2018-08-06 PROCEDURE — 96372 THER/PROPH/DIAG INJ SC/IM: CPT | Performed by: INTERNAL MEDICINE

## 2018-08-06 RX ADMIN — CYANOCOBALAMIN 1000 MCG: 1000 INJECTION, SOLUTION INTRAMUSCULAR; SUBCUTANEOUS at 09:52

## 2018-08-23 ENCOUNTER — OFFICE VISIT (OUTPATIENT)
Dept: FAMILY MEDICINE CLINIC | Facility: CLINIC | Age: 68
End: 2018-08-23

## 2018-08-23 VITALS
HEIGHT: 72 IN | OXYGEN SATURATION: 98 % | SYSTOLIC BLOOD PRESSURE: 122 MMHG | WEIGHT: 224.2 LBS | HEART RATE: 64 BPM | DIASTOLIC BLOOD PRESSURE: 72 MMHG | BODY MASS INDEX: 30.37 KG/M2

## 2018-08-23 DIAGNOSIS — E78.00 HYPERCHOLESTEROLEMIA: ICD-10-CM

## 2018-08-23 DIAGNOSIS — Z79.4 UNCONTROLLED TYPE 2 DIABETES MELLITUS WITH HYPERGLYCEMIA, WITH LONG-TERM CURRENT USE OF INSULIN (HCC): ICD-10-CM

## 2018-08-23 DIAGNOSIS — I10 ESSENTIAL HYPERTENSION: Primary | ICD-10-CM

## 2018-08-23 DIAGNOSIS — E11.9 TYPE 2 DIABETES MELLITUS WITHOUT COMPLICATION, UNSPECIFIED LONG TERM INSULIN USE STATUS: ICD-10-CM

## 2018-08-23 DIAGNOSIS — E11.65 UNCONTROLLED TYPE 2 DIABETES MELLITUS WITH HYPERGLYCEMIA, WITH LONG-TERM CURRENT USE OF INSULIN (HCC): ICD-10-CM

## 2018-08-23 DIAGNOSIS — I25.10 ATHEROSCLEROSIS OF NATIVE CORONARY ARTERY OF NATIVE HEART WITHOUT ANGINA PECTORIS: ICD-10-CM

## 2018-08-23 DIAGNOSIS — K21.9 GASTROESOPHAGEAL REFLUX DISEASE WITHOUT ESOPHAGITIS: ICD-10-CM

## 2018-08-23 PROCEDURE — 99214 OFFICE O/P EST MOD 30 MIN: CPT | Performed by: FAMILY MEDICINE

## 2018-08-23 RX ORDER — INSULIN GLARGINE 100 [IU]/ML
180 INJECTION, SOLUTION SUBCUTANEOUS NIGHTLY
Qty: 50 ML | Refills: 5 | Status: SHIPPED | OUTPATIENT
Start: 2018-08-23 | End: 2018-08-23

## 2018-08-23 RX ORDER — INSULIN GLARGINE 100 [IU]/ML
180 INJECTION, SOLUTION SUBCUTANEOUS DAILY
Qty: 50 ML | Refills: 5 | Status: SHIPPED | OUTPATIENT
Start: 2018-08-23 | End: 2019-02-26 | Stop reason: SDUPTHER

## 2018-08-23 NOTE — PROGRESS NOTES
Subjective   Beka Zavaleta is a 67 y.o. male.     Diabetes   He presents for his follow-up diabetic visit. He has type 2 diabetes mellitus. His disease course has been fluctuating. Pertinent negatives for hypoglycemia include no confusion or hunger. Pertinent negatives for diabetes include no chest pain, no foot paresthesias, no foot ulcerations, no polydipsia and no polyuria. His breakfast blood glucose is taken between 7-8 am. His breakfast blood glucose range is generally 140-180 mg/dl. (  ) An ACE inhibitor/angiotensin II receptor blocker is being taken. Eye exam is current.   Hypertension   This is a chronic problem. The current episode started more than 1 year ago. The problem has been waxing and waning since onset. The problem is controlled. Associated symptoms include shortness of breath (JAVIER). Pertinent negatives include no chest pain, orthopnea, palpitations, peripheral edema or PND.   Coronary Artery Disease   Presents for follow-up visit. Symptoms include shortness of breath (JAVIER). Pertinent negatives include no chest pain, chest pressure, chest tightness, leg swelling, palpitations or weight gain. Risk factors include hyperlipidemia.   Heartburn   He reports no chest pain, no dysphagia or no heartburn. This is a chronic problem. The current episode started more than 1 year ago. The problem occurs occasionally.   Hyperlipidemia   This is a chronic problem. The current episode started more than 1 year ago. The problem is controlled. Recent lipid tests were reviewed and are normal. Associated symptoms include shortness of breath (JAVIER). Pertinent negatives include no chest pain or myalgias.        The following portions of the patient's history were reviewed and updated as appropriate: allergies, current medications, past family history, past medical history, past social history, past surgical history and problem list.    Review of Systems   Constitutional: Negative for weight gain.   Respiratory:  Positive for shortness of breath (JAVIER). Negative for chest tightness.    Cardiovascular: Negative for chest pain, palpitations, orthopnea, leg swelling and PND.   Gastrointestinal: Negative for dysphagia and heartburn.   Endocrine: Negative for polydipsia and polyuria.   Musculoskeletal: Negative for myalgias.   Psychiatric/Behavioral: Negative for confusion.       Objective   Physical Exam   Constitutional: He is oriented to person, place, and time. He appears well-developed and well-nourished. No distress.   HENT:   Head: Normocephalic and atraumatic.   Cardiovascular: Normal rate, regular rhythm, normal heart sounds and intact distal pulses.  Exam reveals no gallop and no friction rub.    No murmur heard.  Pulmonary/Chest: Effort normal and breath sounds normal. No respiratory distress. He has no wheezes. He has no rales. He exhibits no tenderness.   Abdominal: Soft. Normal appearance. There is no tenderness.   Musculoskeletal: He exhibits no edema.    Beka had a diabetic foot exam performed (callus noted) today.   During the foot exam he had a monofilament test performed (normal).  Vascular Status -  His right foot exhibits normal foot vasculature  and no edema. His left foot exhibits normal foot vasculature  and no edema.  Neurological: He is alert and oriented to person, place, and time.   Skin: Skin is warm and dry. He is not diaphoretic.   Psychiatric: He has a normal mood and affect. His behavior is normal. Judgment and thought content normal.   Nursing note and vitals reviewed.      Assessment/Plan   Problems Addressed this Visit        Cardiovascular and Mediastinum    Hypercholesterolemia    Essential hypertension - Primary    Coronary atherosclerosis       Digestive    GERD (gastroesophageal reflux disease)       Endocrine    Type II diabetes mellitus, uncontrolled (CMS/[facility])    Relevant Medications    insulin glargine (LANTUS) 100 UNIT/ML injection    Type 2 diabetes mellitus (CMS/[facility])    Relevant  Medications    insulin glargine (LANTUS) 100 UNIT/ML injection            Inc glargine to 180/day

## 2018-09-05 ENCOUNTER — CLINICAL SUPPORT (OUTPATIENT)
Dept: FAMILY MEDICINE CLINIC | Facility: CLINIC | Age: 68
End: 2018-09-05

## 2018-09-05 DIAGNOSIS — E53.8 VITAMIN B 12 DEFICIENCY: Primary | ICD-10-CM

## 2018-09-05 PROCEDURE — 96372 THER/PROPH/DIAG INJ SC/IM: CPT | Performed by: FAMILY MEDICINE

## 2018-09-05 RX ADMIN — CYANOCOBALAMIN 1000 MCG: 1000 INJECTION, SOLUTION INTRAMUSCULAR; SUBCUTANEOUS at 11:16

## 2018-10-15 ENCOUNTER — TELEPHONE (OUTPATIENT)
Dept: FAMILY MEDICINE CLINIC | Facility: CLINIC | Age: 68
End: 2018-10-15

## 2018-10-15 RX ORDER — OMEPRAZOLE 40 MG/1
40 CAPSULE, DELAYED RELEASE ORAL DAILY
Qty: 30 CAPSULE | Refills: 5 | Status: SHIPPED | OUTPATIENT
Start: 2018-10-15 | End: 2019-04-10 | Stop reason: SDUPTHER

## 2018-11-07 ENCOUNTER — TELEPHONE (OUTPATIENT)
Dept: FAMILY MEDICINE CLINIC | Facility: CLINIC | Age: 68
End: 2018-11-07

## 2018-11-07 RX ORDER — AMLODIPINE BESYLATE 10 MG/1
10 TABLET ORAL DAILY
Qty: 90 TABLET | Refills: 3 | Status: SHIPPED | OUTPATIENT
Start: 2018-11-07 | End: 2019-12-08 | Stop reason: SDUPTHER

## 2018-11-07 RX ORDER — ATORVASTATIN CALCIUM 80 MG/1
80 TABLET, FILM COATED ORAL DAILY
Qty: 90 TABLET | Refills: 3 | Status: SHIPPED | OUTPATIENT
Start: 2018-11-07 | End: 2019-10-04 | Stop reason: SDUPTHER

## 2018-11-26 ENCOUNTER — TELEPHONE (OUTPATIENT)
Dept: FAMILY MEDICINE CLINIC | Facility: CLINIC | Age: 68
End: 2018-11-26

## 2018-11-26 ENCOUNTER — OFFICE VISIT (OUTPATIENT)
Dept: FAMILY MEDICINE CLINIC | Facility: CLINIC | Age: 68
End: 2018-11-26

## 2018-11-26 ENCOUNTER — LAB (OUTPATIENT)
Dept: LAB | Facility: HOSPITAL | Age: 68
End: 2018-11-26

## 2018-11-26 VITALS
HEART RATE: 66 BPM | WEIGHT: 226.8 LBS | DIASTOLIC BLOOD PRESSURE: 68 MMHG | OXYGEN SATURATION: 98 % | SYSTOLIC BLOOD PRESSURE: 128 MMHG | HEIGHT: 72 IN | BODY MASS INDEX: 30.72 KG/M2

## 2018-11-26 DIAGNOSIS — K21.9 GASTROESOPHAGEAL REFLUX DISEASE WITHOUT ESOPHAGITIS: ICD-10-CM

## 2018-11-26 DIAGNOSIS — E11.65 UNCONTROLLED TYPE 2 DIABETES MELLITUS WITH HYPERGLYCEMIA, WITH LONG-TERM CURRENT USE OF INSULIN (HCC): ICD-10-CM

## 2018-11-26 DIAGNOSIS — Z12.5 PROSTATE CANCER SCREENING: ICD-10-CM

## 2018-11-26 DIAGNOSIS — E78.00 HYPERCHOLESTEROLEMIA: ICD-10-CM

## 2018-11-26 DIAGNOSIS — I10 ESSENTIAL HYPERTENSION: Primary | ICD-10-CM

## 2018-11-26 DIAGNOSIS — Z79.4 UNCONTROLLED TYPE 2 DIABETES MELLITUS WITH HYPERGLYCEMIA, WITH LONG-TERM CURRENT USE OF INSULIN (HCC): ICD-10-CM

## 2018-11-26 LAB
ALBUMIN SERPL-MCNC: 4.7 G/DL (ref 3.4–4.8)
ALBUMIN UR-MCNC: 0.6 MG/L
ALBUMIN/GLOB SERPL: 1.7 G/DL (ref 1.1–1.8)
ALP SERPL-CCNC: 80 U/L (ref 38–126)
ALT SERPL W P-5'-P-CCNC: 28 U/L (ref 21–72)
ANION GAP SERPL CALCULATED.3IONS-SCNC: 11 MMOL/L (ref 5–15)
ARTICHOKE IGE QN: 70 MG/DL (ref 1–129)
AST SERPL-CCNC: 60 U/L (ref 17–59)
BILIRUB SERPL-MCNC: 0.4 MG/DL (ref 0.2–1.3)
BUN BLD-MCNC: 17 MG/DL (ref 7–21)
BUN/CREAT SERPL: 17.3 (ref 7–25)
CALCIUM SPEC-SCNC: 9.5 MG/DL (ref 8.4–10.2)
CHLORIDE SERPL-SCNC: 101 MMOL/L (ref 95–110)
CHOLEST SERPL-MCNC: 140 MG/DL (ref 0–199)
CO2 SERPL-SCNC: 28 MMOL/L (ref 22–31)
CREAT BLD-MCNC: 0.98 MG/DL (ref 0.7–1.3)
CREAT UR-MCNC: 122.7 MG/DL
GFR SERPL CREATININE-BSD FRML MDRD: 76 ML/MIN/1.73 (ref 49–113)
GLOBULIN UR ELPH-MCNC: 2.8 GM/DL (ref 2.3–3.5)
GLUCOSE BLD-MCNC: 111 MG/DL (ref 60–100)
HBA1C MFR BLD: 8.6 % (ref 4–5.6)
HDLC SERPL-MCNC: 37 MG/DL (ref 60–200)
LDLC/HDLC SERPL: 1.4 {RATIO} (ref 0–3.55)
MAGNESIUM SERPL-MCNC: 2 MG/DL (ref 1.6–2.3)
MICROALBUMIN/CREAT UR: 4.9 MG/G (ref 0–30)
POTASSIUM BLD-SCNC: 4.9 MMOL/L (ref 3.5–5.1)
PROT SERPL-MCNC: 7.5 G/DL (ref 6.3–8.6)
PSA SERPL-MCNC: 0.89 NG/ML (ref 0–4)
SODIUM BLD-SCNC: 140 MMOL/L (ref 137–145)
TRIGL SERPL-MCNC: 256 MG/DL (ref 20–199)
VIT B12 BLD-MCNC: 346 PG/ML (ref 239–931)

## 2018-11-26 PROCEDURE — 83036 HEMOGLOBIN GLYCOSYLATED A1C: CPT | Performed by: FAMILY MEDICINE

## 2018-11-26 PROCEDURE — G0103 PSA SCREENING: HCPCS

## 2018-11-26 PROCEDURE — 82570 ASSAY OF URINE CREATININE: CPT | Performed by: FAMILY MEDICINE

## 2018-11-26 PROCEDURE — 82043 UR ALBUMIN QUANTITATIVE: CPT | Performed by: FAMILY MEDICINE

## 2018-11-26 PROCEDURE — 80053 COMPREHEN METABOLIC PANEL: CPT | Performed by: FAMILY MEDICINE

## 2018-11-26 PROCEDURE — 36415 COLL VENOUS BLD VENIPUNCTURE: CPT | Performed by: FAMILY MEDICINE

## 2018-11-26 PROCEDURE — 83735 ASSAY OF MAGNESIUM: CPT | Performed by: FAMILY MEDICINE

## 2018-11-26 PROCEDURE — 99214 OFFICE O/P EST MOD 30 MIN: CPT | Performed by: FAMILY MEDICINE

## 2018-11-26 PROCEDURE — 82607 VITAMIN B-12: CPT | Performed by: FAMILY MEDICINE

## 2018-11-26 PROCEDURE — 80061 LIPID PANEL: CPT | Performed by: FAMILY MEDICINE

## 2018-11-26 NOTE — TELEPHONE ENCOUNTER
-Pr Dr. Hawkins, Ms. Zavaleta  has been called with Mr. Zavaleta's recent lab results & recommendations.  Continue his current medications and follow-up as planned or sooner if any problems.        ---- Message from Fede Hawkins MD sent at 11/26/2018  4:16 PM CST -----  Glucose high as expected.  Liver function is stable otherwise okay.  His hemoglobin is still below 9.  With his fluctuating glucose levels do not want to make any changes at this time because of recurrent hypoglycemia

## 2018-11-26 NOTE — TELEPHONE ENCOUNTER
Pr Dr. Hawkins, Ms. Zavaleta  has been called with Mr. Zavaleta's recent lab results & recommendations.  Continue his current medications and follow-up as planned or sooner if any problems.      ----- Message from Fede Hawkins MD sent at 11/26/2018  4:10 PM CST -----  Ok, call or send card.

## 2018-11-26 NOTE — PROGRESS NOTES
Glucose high as expected.  Liver function is stable otherwise okay.  His hemoglobin is still below 9.  With his fluctuating glucose levels do not want to make any changes at this time because of recurrent hypoglycemia

## 2018-11-26 NOTE — PROGRESS NOTES
Pr Dr. Hawkins, Ms. Waqar  has been called with Mr. Zavaleta's recent lab results & recommendations.  Continue his current medications and follow-up as planned or sooner if any problems.

## 2018-11-26 NOTE — PROGRESS NOTES
Subjective   Beka Zavaleta is a 67 y.o. male.     Hypertension   This is a chronic problem. The current episode started more than 1 year ago. The problem is unchanged. The problem is controlled. Associated symptoms include chest pain (in work with cardiology) and shortness of breath (JAVIER). Pertinent negatives include no orthopnea, palpitations, peripheral edema or PND.   Diabetes   He presents for his follow-up diabetic visit. He has type 2 diabetes mellitus. His disease course has been fluctuating. Pertinent negatives for hypoglycemia include no confusion or hunger. Associated symptoms include chest pain (in work with cardiology). Pertinent negatives for diabetes include no foot paresthesias, no foot ulcerations, no polydipsia and no polyuria. His home blood glucose trend is fluctuating dramatically. His breakfast blood glucose is taken between 7-8 am. His breakfast blood glucose range is generally >200 mg/dl. (70  to > 300  ) An ACE inhibitor/angiotensin II receptor blocker is being taken. Eye exam is current.   Coronary Artery Disease   Presents for follow-up visit. Symptoms include chest pain (in work with cardiology) and shortness of breath (JAVIER). Pertinent negatives include no chest pressure, chest tightness, leg swelling, palpitations or weight gain. Risk factors include hyperlipidemia.   Heartburn   He complains of chest pain (in work with cardiology). He reports no dysphagia or no heartburn. This is a chronic problem. The current episode started more than 1 year ago. The problem occurs occasionally.   Hyperlipidemia   This is a chronic problem. The current episode started more than 1 year ago. The problem is controlled. Recent lipid tests were reviewed and are normal. Associated symptoms include chest pain (in work with cardiology) and shortness of breath (JAVIER). Pertinent negatives include no myalgias.        The following portions of the patient's history were reviewed and updated as appropriate:  allergies, current medications, past family history, past medical history, past social history, past surgical history and problem list.    Review of Systems   Constitutional: Negative for weight gain.   Respiratory: Positive for shortness of breath (JAVIER). Negative for chest tightness.    Cardiovascular: Positive for chest pain (in work with cardiology). Negative for palpitations, orthopnea, leg swelling and PND.   Gastrointestinal: Negative for dysphagia and heartburn.   Endocrine: Negative for polydipsia and polyuria.   Musculoskeletal: Negative for myalgias.   Psychiatric/Behavioral: Negative for confusion.       Objective   Physical Exam   Constitutional: He is oriented to person, place, and time. He appears well-developed and well-nourished. No distress.   HENT:   Head: Normocephalic and atraumatic.   Cardiovascular: Normal rate, regular rhythm, normal heart sounds and intact distal pulses. Exam reveals no gallop and no friction rub.   No murmur heard.  Pulmonary/Chest: Effort normal and breath sounds normal. No respiratory distress. He has no wheezes. He has no rales. He exhibits no tenderness.   Abdominal: Soft. Normal appearance. There is no tenderness.   Musculoskeletal: He exhibits no edema.    Beka had a diabetic foot exam performed (callus noted) today.   During the foot exam he had a monofilament test performed (normal).  Vascular Status -  His right foot exhibits normal foot vasculature  and no edema. His left foot exhibits normal foot vasculature  and no edema.  Neurological: He is alert and oriented to person, place, and time.   Skin: Skin is warm and dry. He is not diaphoretic.   Psychiatric: He has a normal mood and affect. His behavior is normal. Judgment and thought content normal.   Nursing note and vitals reviewed.      Assessment/Plan   Problems Addressed this Visit        Cardiovascular and Mediastinum    Hypercholesterolemia    Essential hypertension - Primary       Digestive    GERD  (gastroesophageal reflux disease)      Other Visit Diagnoses     Uncontrolled type 2 diabetes mellitus with hyperglycemia, with long-term current use of insulin (CMS/[facility])                   diet discussed

## 2018-11-27 ENCOUNTER — TELEPHONE (OUTPATIENT)
Dept: FAMILY MEDICINE CLINIC | Facility: CLINIC | Age: 68
End: 2018-11-27

## 2018-11-27 RX ORDER — SITAGLIPTIN 100 MG/1
TABLET, FILM COATED ORAL
Qty: 30 TABLET | Refills: 5 | Status: SHIPPED | OUTPATIENT
Start: 2018-11-27 | End: 2019-02-26

## 2018-12-03 ENCOUNTER — TELEPHONE (OUTPATIENT)
Dept: FAMILY MEDICINE CLINIC | Facility: CLINIC | Age: 68
End: 2018-12-03

## 2018-12-03 RX ORDER — CLOPIDOGREL BISULFATE 75 MG/1
75 TABLET ORAL DAILY
Qty: 30 TABLET | Refills: 5 | Status: SHIPPED | OUTPATIENT
Start: 2018-12-03 | End: 2019-06-18 | Stop reason: SDUPTHER

## 2018-12-03 NOTE — TELEPHONE ENCOUNTER
Patient is needing refills on hisclopidogrel (PLAVIX) 75 MG tablet. [facility]

## 2018-12-31 RX ORDER — CARBAMAZEPINE 100 MG/1
TABLET, EXTENDED RELEASE ORAL
Qty: 300 TABLET | Refills: 4 | Status: SHIPPED | OUTPATIENT
Start: 2018-12-31 | End: 2019-06-14 | Stop reason: SDUPTHER

## 2019-01-07 ENCOUNTER — TELEPHONE (OUTPATIENT)
Dept: FAMILY MEDICINE CLINIC | Facility: CLINIC | Age: 69
End: 2019-01-07

## 2019-01-07 RX ORDER — LISINOPRIL 40 MG/1
40 TABLET ORAL 2 TIMES DAILY
Qty: 180 TABLET | Refills: 1 | Status: SHIPPED | OUTPATIENT
Start: 2019-01-07 | End: 2019-07-11 | Stop reason: SDUPTHER

## 2019-01-07 NOTE — TELEPHONE ENCOUNTER
NETTA CONN IS NEEDING REFILL ON LSINOPRIL 40MG TO BE SENT TO [facility]

## 2019-01-28 ENCOUNTER — TELEPHONE (OUTPATIENT)
Dept: FAMILY MEDICINE CLINIC | Facility: CLINIC | Age: 69
End: 2019-01-28

## 2019-01-28 RX ORDER — DOXAZOSIN MESYLATE 4 MG/1
4 TABLET ORAL NIGHTLY
Qty: 90 TABLET | Refills: 3 | Status: SHIPPED | OUTPATIENT
Start: 2019-01-28 | End: 2019-10-31 | Stop reason: SDUPTHER

## 2019-01-28 NOTE — TELEPHONE ENCOUNTER
Wife called in and states he needs refills on his doxazosin (CARDURA) 4 MG tablet. [facility]

## 2019-01-30 ENCOUNTER — TELEPHONE (OUTPATIENT)
Dept: FAMILY MEDICINE CLINIC | Facility: CLINIC | Age: 69
End: 2019-01-30

## 2019-01-30 NOTE — TELEPHONE ENCOUNTER
I called [facility] in [place]. They did have the script and will get it ready for the patient.  Mr. Zavaleta has been called.  The requested Script was sent via e-script on 1/28/19

## 2019-01-30 NOTE — TELEPHONE ENCOUNTER
[facility]...THEY SAID THEY NEVER RECEIVED ANYTHING

## 2019-02-12 ENCOUNTER — TELEPHONE (OUTPATIENT)
Dept: FAMILY MEDICINE CLINIC | Facility: CLINIC | Age: 69
End: 2019-02-12

## 2019-02-12 RX ORDER — ATENOLOL 100 MG/1
100 TABLET ORAL DAILY
Qty: 90 TABLET | Refills: 3 | Status: SHIPPED | OUTPATIENT
Start: 2019-02-12 | End: 2020-02-03

## 2019-02-26 ENCOUNTER — OFFICE VISIT (OUTPATIENT)
Dept: FAMILY MEDICINE CLINIC | Facility: CLINIC | Age: 69
End: 2019-02-26

## 2019-02-26 VITALS
HEIGHT: 72 IN | SYSTOLIC BLOOD PRESSURE: 122 MMHG | DIASTOLIC BLOOD PRESSURE: 70 MMHG | WEIGHT: 223.7 LBS | HEART RATE: 64 BPM | BODY MASS INDEX: 30.3 KG/M2 | OXYGEN SATURATION: 98 %

## 2019-02-26 DIAGNOSIS — E78.00 HYPERCHOLESTEROLEMIA: ICD-10-CM

## 2019-02-26 DIAGNOSIS — K21.9 GASTROESOPHAGEAL REFLUX DISEASE WITHOUT ESOPHAGITIS: ICD-10-CM

## 2019-02-26 DIAGNOSIS — E11.65 UNCONTROLLED TYPE 2 DIABETES MELLITUS WITH HYPERGLYCEMIA, WITH LONG-TERM CURRENT USE OF INSULIN (HCC): ICD-10-CM

## 2019-02-26 DIAGNOSIS — Z79.4 UNCONTROLLED TYPE 2 DIABETES MELLITUS WITH HYPERGLYCEMIA, WITH LONG-TERM CURRENT USE OF INSULIN (HCC): ICD-10-CM

## 2019-02-26 DIAGNOSIS — I10 ESSENTIAL HYPERTENSION: Primary | ICD-10-CM

## 2019-02-26 DIAGNOSIS — E11.9 TYPE 2 DIABETES MELLITUS WITHOUT COMPLICATION (HCC): ICD-10-CM

## 2019-02-26 PROCEDURE — 99214 OFFICE O/P EST MOD 30 MIN: CPT | Performed by: FAMILY MEDICINE

## 2019-02-26 RX ORDER — RANOLAZINE 500 MG/1
2 TABLET, EXTENDED RELEASE ORAL 2 TIMES DAILY
COMMUNITY
Start: 2019-02-15

## 2019-02-26 RX ORDER — INSULIN GLARGINE 100 [IU]/ML
190 INJECTION, SOLUTION SUBCUTANEOUS DAILY
Qty: 50 ML | Refills: 5 | Status: SHIPPED | OUTPATIENT
Start: 2019-02-26 | End: 2019-05-31

## 2019-02-26 NOTE — PROGRESS NOTES
Subjective   Beka Zavaleta is a 68 y.o. male.     Hypertension   This is a chronic problem. The current episode started more than 1 year ago. The problem is unchanged. The problem is controlled. Associated symptoms include chest pain (2 stents 02/2019) and shortness of breath (JAVIER). Pertinent negatives include no orthopnea, palpitations, peripheral edema or PND.   Diabetes   He presents for his follow-up diabetic visit. He has type 2 diabetes mellitus. His disease course has been fluctuating. Pertinent negatives for hypoglycemia include no confusion or hunger. Associated symptoms include chest pain (2 stents 02/2019). Pertinent negatives for diabetes include no foot paresthesias, no foot ulcerations, no polydipsia and no polyuria. His home blood glucose trend is fluctuating dramatically. His breakfast blood glucose is taken between 7-8 am. His breakfast blood glucose range is generally >200 mg/dl. (70  to > 300  ) An ACE inhibitor/angiotensin II receptor blocker is being taken. Eye exam is current.   Coronary Artery Disease   Presents for follow-up visit. Symptoms include chest pain (2 stents 02/2019) and shortness of breath (JAVIER). Pertinent negatives include no chest pressure, chest tightness, leg swelling, palpitations or weight gain. Risk factors include hyperlipidemia.   Heartburn   He complains of chest pain (2 stents 02/2019). He reports no dysphagia or no heartburn. This is a chronic problem. The current episode started more than 1 year ago. The problem occurs occasionally.   Hyperlipidemia   This is a chronic problem. The current episode started more than 1 year ago. The problem is controlled. Recent lipid tests were reviewed and are normal. Associated symptoms include chest pain (2 stents 02/2019) and shortness of breath (JAVIER). Pertinent negatives include no myalgias.        The following portions of the patient's history were reviewed and updated as appropriate: allergies, current medications, past  family history, past medical history, past social history, past surgical history and problem list.    Review of Systems   Constitutional: Negative for weight gain.   Respiratory: Positive for shortness of breath (JAVIER). Negative for chest tightness.    Cardiovascular: Positive for chest pain (2 stents 02/2019). Negative for palpitations, orthopnea, leg swelling and PND.   Gastrointestinal: Negative for dysphagia and heartburn.   Endocrine: Negative for polydipsia and polyuria.   Musculoskeletal: Negative for myalgias.   Psychiatric/Behavioral: Negative for confusion.       Objective   Physical Exam   Constitutional: He is oriented to person, place, and time. He appears well-developed and well-nourished. No distress.   HENT:   Head: Normocephalic and atraumatic.   Cardiovascular: Normal rate, regular rhythm, normal heart sounds and intact distal pulses. Exam reveals no gallop and no friction rub.   No murmur heard.  Pulmonary/Chest: Effort normal and breath sounds normal. No respiratory distress. He has no wheezes. He has no rales. He exhibits no tenderness.   Abdominal: Soft. Normal appearance. There is no tenderness.   Musculoskeletal: He exhibits no edema.    Beka had a diabetic foot exam performed (callus noted) today.   During the foot exam he had a monofilament test performed (normal).  Vascular Status -  His right foot exhibits no edema. His left foot exhibits no edema.  Neurological: He is alert and oriented to person, place, and time.   Skin: Skin is warm and dry. He is not diaphoretic.   Psychiatric: He has a normal mood and affect. His behavior is normal. Judgment and thought content normal.   Nursing note and vitals reviewed.      Assessment/Plan   Problems Addressed this Visit        Cardiovascular and Mediastinum    Hypercholesterolemia    Essential hypertension - Primary       Digestive    GERD (gastroesophageal reflux disease)      Other Visit Diagnoses     Type 2 diabetes mellitus without  complication (CMS/[facility])        Relevant Medications    insulin glargine (LANTUS) 100 UNIT/ML injection    Uncontrolled type 2 diabetes mellitus with hyperglycemia, with long-term current use of insulin (CMS/[facility])        Relevant Medications    insulin glargine (LANTUS) 100 UNIT/ML injection               c lantus fron 180 -190. Call glu 1 week

## 2019-03-26 ENCOUNTER — TELEPHONE (OUTPATIENT)
Dept: FAMILY MEDICINE CLINIC | Facility: CLINIC | Age: 69
End: 2019-03-26

## 2019-03-26 NOTE — TELEPHONE ENCOUNTER
Talia-wife called for him- he needs new pres for metformin 500 mg -90 day supply with refills sent to [facility] in [place].He sees Dr Hawkins and can be reached at [phone] if any questions.

## 2019-04-03 ENCOUNTER — OFFICE VISIT (OUTPATIENT)
Dept: SURGERY | Facility: CLINIC | Age: 69
End: 2019-04-03

## 2019-04-03 VITALS
HEART RATE: 66 BPM | BODY MASS INDEX: 30.61 KG/M2 | DIASTOLIC BLOOD PRESSURE: 62 MMHG | SYSTOLIC BLOOD PRESSURE: 128 MMHG | WEIGHT: 226 LBS | TEMPERATURE: 97.8 F | HEIGHT: 72 IN

## 2019-04-03 DIAGNOSIS — Z86.010 ENCOUNTER FOR COLONOSCOPY DUE TO HISTORY OF ADENOMATOUS COLONIC POLYPS: Primary | ICD-10-CM

## 2019-04-03 DIAGNOSIS — Z12.11 ENCOUNTER FOR COLONOSCOPY DUE TO HISTORY OF ADENOMATOUS COLONIC POLYPS: Primary | ICD-10-CM

## 2019-04-03 PROCEDURE — 99213 OFFICE O/P EST LOW 20 MIN: CPT | Performed by: SURGERY

## 2019-04-03 RX ORDER — DEXTROSE AND SODIUM CHLORIDE 5; .45 G/100ML; G/100ML
100 INJECTION, SOLUTION INTRAVENOUS CONTINUOUS
Status: CANCELLED | OUTPATIENT
Start: 2019-04-19

## 2019-04-03 NOTE — H&P (VIEW-ONLY)
Chief Complaint   Patient presents with   • Follow-up     1 Year skye to Schedule Colonoscopy       Beka Zavaleta is a 68 y.o. male referred today for evaluation for colonoscopy.  He notes no change in bowel habits, no blood in the stool.   Had total of 9 adenomatous polyps removed last year.  Prep was borderline.  On plavix for stents and able to hold for a week.     Prior Colonoscopy:yes  Prior Polyps:yes  Family History of Colon Cancer:no  On anticoagulation:yes    Past Surgical History:   Procedure Laterality Date   • CARDIAC CATHETERIZATION  12/18/2014    Stent in LAD widely patent. Distal LAD mild 50% stenosis. Circumflex PDA about 50-70%. RCA nondominant. Circumflex dominant.   • COLONOSCOPY N/A 3/23/2018    Procedure: COLONOSCOPY;  Surgeon: Riley Osorio MD;  Location: [facility] ENDOSCOPY;  Service: Gastroenterology   • INJECTION OF MEDICATION  07/05/2016    B12 (Cobalamin deficiency) (53)     Past Medical History:   Diagnosis Date   • Cobalamin deficiency    • Coronary atherosclerosis     post stent      • Diverticular disease of colon    • Encounter for screening for malignant neoplasm of prostate 08/13/2014   • Essential hypertension    • GERD (gastroesophageal reflux disease)    • Goiter    • History of colon polyps    • History of echocardiogram 05/10/2016    Mild dilated LV with normal LV systolic function, with LVEF of 60%.Diastolic dysfunction of the left ventricle.Moderately left atrial dilatation.Midl mitral and pulmonic valve regurg.Trivial tricuspid valve regurg.No pul.htn.[facility]   • History of Holter monitoring 05/09/2016    Rare PACs and PVCs.One run of SVT with 6 beats at 147 BPM. [facility].   • History of trigeminal neuralgia    • Hypercholesterolemia    • Hyperkalemia    • Impotence    • Malaise and fatigue    • Obstructive sleep apnea syndrome    • Plantar fasciitis    • Type 2 diabetes mellitus (CMS/HCC)    • Type II diabetes mellitus, uncontrolled (CMS/HCC)     • Vitamin deficiency      Social History     Socioeconomic History   • Marital status:      Spouse name: Not on file   • Number of children: Not on file   • Years of education: Not on file   • Highest education level: Not on file   Tobacco Use   • Smoking status: Former Smoker     Types: Cigarettes     Last attempt to quit: 1988     Years since quittin.2   • Smokeless tobacco: Former User     Types: Chew   Substance and Sexual Activity   • Alcohol use: No   • Drug use: No   • Sexual activity: Defer     No Known Allergies    Home Medications:  Prior to Admission medications    Medication Sig Start Date End Date Taking? Authorizing Provider   amLODIPine (NORVASC) 10 MG tablet Take 1 tablet by mouth Daily. 18  Yes Fede Hawkins MD   aspirin 81 MG tablet Take 81 mg by mouth daily.   Yes Jinny Laboy MD   atenolol (TENORMIN) 100 MG tablet Take 1 tablet by mouth Daily. 19  Yes Fede Hawkins MD   atorvastatin (LIPITOR) 80 MG tablet Take 1 tablet by mouth Daily. 18  Yes Fede Hawkins MD   carBAMazepine XR (TEGretol  XR) 100 MG 12 hr tablet TAKE 4 TABLETS BY MOUTH EVERY MORNING , 3 AT LUNCH AND TAKE 3 TABLETS AT BEDTIME 18  Yes Fede Hawkins MD   clopidogrel (PLAVIX) 75 MG tablet Take 1 tablet by mouth Daily. 12/3/18  Yes Fede Hawkins MD   colestipol (COLESTID) 1 g tablet Take 2 tablets by mouth 2 (Two) Times a Day. 18  Yes Toan Sparks MD   Cyanocobalamin (B-12 COMPLIANCE INJECTION) 1000 MCG/ML kit Inject 1 mL as directed Every 28 (Twenty-Eight) Days. Intramuscular 18  Yes Fede Hawkins MD   doxazosin (CARDURA) 4 MG tablet Take 1 tablet by mouth Every Night. 19  Yes Fede Hawkins MD   glucose blood (ACCU-CHEK ENDER) test strip Test sugars up to 4 times daily as directed by Physician.   Yes Jinny Laboy MD   insulin glargine (LANTUS) 100 UNIT/ML injection Inject 190 Units under the skin into the appropriate area as directed  "Daily. (Dosage Increased due to being on Prednisone) 2/26/19  Yes Fede Hawkins MD   Insulin Syringe-Needle U-100 30G X 7/16\" 1 ML misc daily.   Yes ProviderJinny MD   isosorbide mononitrate (IMDUR) 30 MG 24 hr tablet Take 1 tablet by mouth Daily. Take 1/2 pill once a day  Patient taking differently: Take 30 mg by mouth Daily. 2/2/17  Yes Fede Hawkins MD   lisinopril (PRINIVIL,ZESTRIL) 40 MG tablet Take 1 tablet by mouth 2 (Two) Times a Day. 1/7/19  Yes Fede Hawkins MD   magnesium oxide (MAGOX) 400 (241.3 MG) MG tablet tablet Take 400 mg by mouth Daily.   Yes Emergency, Nurse Chaz, RN   metFORMIN (GLUCOPHAGE) 500 MG tablet Take 1 tablet by mouth 2 (Two) Times a Day With Meals. 3/26/19  Yes Fede Hawkins MD   Multiple Vitamins-Minerals (ICAPS PO) Take  by mouth daily.   Yes ProviderJinny MD   nitroglycerin (NITROSTAT) 0.4 MG SL tablet Place 0.4 mg under the tongue as needed for chest pain. repeat X 1 in 5 min. if not relieved and then got to ER or call an ambulance   Yes Jinny Laboy MD   omeprazole (priLOSEC) 40 MG capsule Take 1 capsule by mouth Daily. 10/15/18  Yes Fede Hawkins MD   ranolazine (RANEXA) 500 MG 12 hr tablet Take 1 tablet by mouth Every 12 (Twelve) Hours. 2/15/19  Yes Jinny Laboy MD   RELION ULTRA THIN PLUS LANCETS misc test once daily   Yes Jinny Laboy MD   SITagliptin (JANUVIA) 100 MG tablet Take 1 tablet by mouth Daily. 5/3/18  Yes Fede Hawkins MD   spironolactone (ALDACTONE) 25 MG tablet Take 1 tablet by mouth Daily. 5/21/18  Yes Fede Hawkins MD       Review of Systems   Constitutional: Negative.    HENT: Negative for hearing loss, nosebleeds and trouble swallowing.    Respiratory: Negative for apnea, chest tightness and shortness of breath.    Cardiovascular: Negative for chest pain and palpitations.   Gastrointestinal: Negative for abdominal distention, abdominal pain, blood in stool, constipation, diarrhea, nausea " and vomiting.   Genitourinary: Negative for difficulty urinating, dysuria, frequency and urgency.   Musculoskeletal: Negative for back pain, joint swelling and neck pain.   Skin: Negative for rash.   Neurological: Negative for dizziness, seizures, weakness, light-headedness, numbness and headaches.   Hematological: Negative for adenopathy.   Psychiatric/Behavioral: Negative for agitation. The patient is not nervous/anxious.        Vitals:    04/03/19 1043   BP: 128/62   Pulse: 66   Temp: 97.8 °F (36.6 °C)       Physical Exam   Constitutional: He is oriented to person, place, and time. He appears well-developed and well-nourished. No distress.   HENT:   Head: Normocephalic and atraumatic.   Nose: Nose normal.   Eyes: Conjunctivae are normal.   Neck: Normal range of motion. No tracheal deviation present. No thyromegaly present.   Cardiovascular: Normal rate, regular rhythm and normal heart sounds.   No murmur heard.  Pulmonary/Chest: Effort normal and breath sounds normal. No respiratory distress. He has no wheezes. He has no rales. He exhibits no tenderness.   Abdominal: Soft. He exhibits no distension. There is no tenderness. There is no rebound and no guarding. No hernia.   Musculoskeletal: He exhibits no tenderness or deformity.   Neurological: He is alert and oriented to person, place, and time.   Skin: Skin is warm and dry. No rash noted.   Psychiatric: He has a normal mood and affect. His behavior is normal. Judgment and thought content normal.   Vitals reviewed.      Assessment     In need of   colonoscopy.    Plan     Risks, benefits, rationale and prep for colonoscopy have been discussed with the patient.  The patient indicates understanding of these issues and agrees with the plan.    Discussed prep and importance of clear liquid diet and laxative.  Will hold plavix for one week.

## 2019-04-03 NOTE — PATIENT INSTRUCTIONS

## 2019-04-03 NOTE — PROGRESS NOTES
Chief Complaint   Patient presents with   • Follow-up     1 Year skye to Schedule Colonoscopy       Beka Zavaleta is a 68 y.o. male referred today for evaluation for colonoscopy.  He notes no change in bowel habits, no blood in the stool.   Had total of 9 adenomatous polyps removed last year.  Prep was borderline.  On plavix for stents and able to hold for a week.     Prior Colonoscopy:yes  Prior Polyps:yes  Family History of Colon Cancer:no  On anticoagulation:yes    Past Surgical History:   Procedure Laterality Date   • CARDIAC CATHETERIZATION  12/18/2014    Stent in LAD widely patent. Distal LAD mild 50% stenosis. Circumflex PDA about 50-70%. RCA nondominant. Circumflex dominant.   • COLONOSCOPY N/A 3/23/2018    Procedure: COLONOSCOPY;  Surgeon: Riley Osorio MD;  Location: [facility] ENDOSCOPY;  Service: Gastroenterology   • INJECTION OF MEDICATION  07/05/2016    B12 (Cobalamin deficiency) (53)     Past Medical History:   Diagnosis Date   • Cobalamin deficiency    • Coronary atherosclerosis     post stent      • Diverticular disease of colon    • Encounter for screening for malignant neoplasm of prostate 08/13/2014   • Essential hypertension    • GERD (gastroesophageal reflux disease)    • Goiter    • History of colon polyps    • History of echocardiogram 05/10/2016    Mild dilated LV with normal LV systolic function, with LVEF of 60%.Diastolic dysfunction of the left ventricle.Moderately left atrial dilatation.Midl mitral and pulmonic valve regurg.Trivial tricuspid valve regurg.No pul.htn.[facility]   • History of Holter monitoring 05/09/2016    Rare PACs and PVCs.One run of SVT with 6 beats at 147 BPM. [facility].   • History of trigeminal neuralgia    • Hypercholesterolemia    • Hyperkalemia    • Impotence    • Malaise and fatigue    • Obstructive sleep apnea syndrome    • Plantar fasciitis    • Type 2 diabetes mellitus (CMS/HCC)    • Type II diabetes mellitus, uncontrolled (CMS/HCC)     • Vitamin deficiency      Social History     Socioeconomic History   • Marital status:      Spouse name: Not on file   • Number of children: Not on file   • Years of education: Not on file   • Highest education level: Not on file   Tobacco Use   • Smoking status: Former Smoker     Types: Cigarettes     Last attempt to quit: 1988     Years since quittin.2   • Smokeless tobacco: Former User     Types: Chew   Substance and Sexual Activity   • Alcohol use: No   • Drug use: No   • Sexual activity: Defer     No Known Allergies    Home Medications:  Prior to Admission medications    Medication Sig Start Date End Date Taking? Authorizing Provider   amLODIPine (NORVASC) 10 MG tablet Take 1 tablet by mouth Daily. 18  Yes Fede Hawkins MD   aspirin 81 MG tablet Take 81 mg by mouth daily.   Yes Jinny Laboy MD   atenolol (TENORMIN) 100 MG tablet Take 1 tablet by mouth Daily. 19  Yes Fede Hawkins MD   atorvastatin (LIPITOR) 80 MG tablet Take 1 tablet by mouth Daily. 18  Yes Fede Hawkins MD   carBAMazepine XR (TEGretol  XR) 100 MG 12 hr tablet TAKE 4 TABLETS BY MOUTH EVERY MORNING , 3 AT LUNCH AND TAKE 3 TABLETS AT BEDTIME 18  Yes Fede Hawkins MD   clopidogrel (PLAVIX) 75 MG tablet Take 1 tablet by mouth Daily. 12/3/18  Yes Fede Hawkins MD   colestipol (COLESTID) 1 g tablet Take 2 tablets by mouth 2 (Two) Times a Day. 18  Yes Toan Sparks MD   Cyanocobalamin (B-12 COMPLIANCE INJECTION) 1000 MCG/ML kit Inject 1 mL as directed Every 28 (Twenty-Eight) Days. Intramuscular 18  Yes Fede Hawkins MD   doxazosin (CARDURA) 4 MG tablet Take 1 tablet by mouth Every Night. 19  Yes Fede Hawkins MD   glucose blood (ACCU-CHEK ENDER) test strip Test sugars up to 4 times daily as directed by Physician.   Yes Jinny Laboy MD   insulin glargine (LANTUS) 100 UNIT/ML injection Inject 190 Units under the skin into the appropriate area as directed  "Daily. (Dosage Increased due to being on Prednisone) 2/26/19  Yes Fede Hawkins MD   Insulin Syringe-Needle U-100 30G X 7/16\" 1 ML misc daily.   Yes ProviderJinny MD   isosorbide mononitrate (IMDUR) 30 MG 24 hr tablet Take 1 tablet by mouth Daily. Take 1/2 pill once a day  Patient taking differently: Take 30 mg by mouth Daily. 2/2/17  Yes Fede Hawkins MD   lisinopril (PRINIVIL,ZESTRIL) 40 MG tablet Take 1 tablet by mouth 2 (Two) Times a Day. 1/7/19  Yes Fede Hawkins MD   magnesium oxide (MAGOX) 400 (241.3 MG) MG tablet tablet Take 400 mg by mouth Daily.   Yes Emergency, Nurse Chaz, RN   metFORMIN (GLUCOPHAGE) 500 MG tablet Take 1 tablet by mouth 2 (Two) Times a Day With Meals. 3/26/19  Yes Fede Hawkins MD   Multiple Vitamins-Minerals (ICAPS PO) Take  by mouth daily.   Yes ProviderJinny MD   nitroglycerin (NITROSTAT) 0.4 MG SL tablet Place 0.4 mg under the tongue as needed for chest pain. repeat X 1 in 5 min. if not relieved and then got to ER or call an ambulance   Yes Jinny Laboy MD   omeprazole (priLOSEC) 40 MG capsule Take 1 capsule by mouth Daily. 10/15/18  Yes Fede Hawkins MD   ranolazine (RANEXA) 500 MG 12 hr tablet Take 1 tablet by mouth Every 12 (Twelve) Hours. 2/15/19  Yes Jinny Laboy MD   RELION ULTRA THIN PLUS LANCETS misc test once daily   Yes Jinny Laboy MD   SITagliptin (JANUVIA) 100 MG tablet Take 1 tablet by mouth Daily. 5/3/18  Yes Fede Hawkins MD   spironolactone (ALDACTONE) 25 MG tablet Take 1 tablet by mouth Daily. 5/21/18  Yes Fede Hawkins MD       Review of Systems   Constitutional: Negative.    HENT: Negative for hearing loss, nosebleeds and trouble swallowing.    Respiratory: Negative for apnea, chest tightness and shortness of breath.    Cardiovascular: Negative for chest pain and palpitations.   Gastrointestinal: Negative for abdominal distention, abdominal pain, blood in stool, constipation, diarrhea, nausea " and vomiting.   Genitourinary: Negative for difficulty urinating, dysuria, frequency and urgency.   Musculoskeletal: Negative for back pain, joint swelling and neck pain.   Skin: Negative for rash.   Neurological: Negative for dizziness, seizures, weakness, light-headedness, numbness and headaches.   Hematological: Negative for adenopathy.   Psychiatric/Behavioral: Negative for agitation. The patient is not nervous/anxious.        Vitals:    04/03/19 1043   BP: 128/62   Pulse: 66   Temp: 97.8 °F (36.6 °C)       Physical Exam   Constitutional: He is oriented to person, place, and time. He appears well-developed and well-nourished. No distress.   HENT:   Head: Normocephalic and atraumatic.   Nose: Nose normal.   Eyes: Conjunctivae are normal.   Neck: Normal range of motion. No tracheal deviation present. No thyromegaly present.   Cardiovascular: Normal rate, regular rhythm and normal heart sounds.   No murmur heard.  Pulmonary/Chest: Effort normal and breath sounds normal. No respiratory distress. He has no wheezes. He has no rales. He exhibits no tenderness.   Abdominal: Soft. He exhibits no distension. There is no tenderness. There is no rebound and no guarding. No hernia.   Musculoskeletal: He exhibits no tenderness or deformity.   Neurological: He is alert and oriented to person, place, and time.   Skin: Skin is warm and dry. No rash noted.   Psychiatric: He has a normal mood and affect. His behavior is normal. Judgment and thought content normal.   Vitals reviewed.      Assessment     In need of   colonoscopy.    Plan     Risks, benefits, rationale and prep for colonoscopy have been discussed with the patient.  The patient indicates understanding of these issues and agrees with the plan.    Discussed prep and importance of clear liquid diet and laxative.  Will hold plavix for one week.

## 2019-04-10 ENCOUNTER — TELEPHONE (OUTPATIENT)
Dept: FAMILY MEDICINE CLINIC | Facility: CLINIC | Age: 69
End: 2019-04-10

## 2019-04-10 RX ORDER — OMEPRAZOLE 40 MG/1
40 CAPSULE, DELAYED RELEASE ORAL DAILY
Qty: 30 CAPSULE | Refills: 5 | Status: SHIPPED | OUTPATIENT
Start: 2019-04-10 | End: 2019-10-07 | Stop reason: SDUPTHER

## 2019-04-10 NOTE — TELEPHONE ENCOUNTER
NETTA CONN IS NEEDING REFILL ON OMEPRAZOLE 40MG TO BE SENT TO [facility]  CALLBACK# [phone]

## 2019-04-19 ENCOUNTER — ANESTHESIA (OUTPATIENT)
Dept: GASTROENTEROLOGY | Facility: HOSPITAL | Age: 69
End: 2019-04-19

## 2019-04-19 ENCOUNTER — ANESTHESIA EVENT (OUTPATIENT)
Dept: GASTROENTEROLOGY | Facility: HOSPITAL | Age: 69
End: 2019-04-19

## 2019-04-19 ENCOUNTER — HOSPITAL ENCOUNTER (OUTPATIENT)
Facility: HOSPITAL | Age: 69
Setting detail: HOSPITAL OUTPATIENT SURGERY
Discharge: HOME | End: 2019-04-19
Attending: SURGERY | Admitting: SURGERY

## 2019-04-19 VITALS
HEIGHT: 72 IN | TEMPERATURE: 96.5 F | SYSTOLIC BLOOD PRESSURE: 135 MMHG | HEART RATE: 68 BPM | DIASTOLIC BLOOD PRESSURE: 63 MMHG | BODY MASS INDEX: 29.73 KG/M2 | RESPIRATION RATE: 16 BRPM | OXYGEN SATURATION: 95 % | WEIGHT: 219.5 LBS

## 2019-04-19 DIAGNOSIS — Z86.010 ENCOUNTER FOR COLONOSCOPY DUE TO HISTORY OF ADENOMATOUS COLONIC POLYPS: ICD-10-CM

## 2019-04-19 DIAGNOSIS — Z12.11 ENCOUNTER FOR COLONOSCOPY DUE TO HISTORY OF ADENOMATOUS COLONIC POLYPS: ICD-10-CM

## 2019-04-19 PROCEDURE — 25010000002 FENTANYL CITRATE (PF) 100 MCG/2ML SOLUTION: Performed by: NURSE ANESTHETIST, CERTIFIED REGISTERED

## 2019-04-19 PROCEDURE — 45380 COLONOSCOPY AND BIOPSY: CPT | Performed by: SURGERY

## 2019-04-19 PROCEDURE — 88305 TISSUE EXAM BY PATHOLOGIST: CPT | Performed by: SURGERY

## 2019-04-19 PROCEDURE — 88305 TISSUE EXAM BY PATHOLOGIST: CPT | Performed by: PATHOLOGY

## 2019-04-19 PROCEDURE — 45385 COLONOSCOPY W/LESION REMOVAL: CPT | Performed by: SURGERY

## 2019-04-19 PROCEDURE — 25010000002 PROPOFOL 10 MG/ML EMULSION: Performed by: NURSE ANESTHETIST, CERTIFIED REGISTERED

## 2019-04-19 RX ORDER — DEXTROSE AND SODIUM CHLORIDE 5; .45 G/100ML; G/100ML
100 INJECTION, SOLUTION INTRAVENOUS CONTINUOUS
Status: DISCONTINUED | OUTPATIENT
Start: 2019-04-19 | End: 2019-04-19 | Stop reason: HOSPADM

## 2019-04-19 RX ORDER — PROMETHAZINE HYDROCHLORIDE 25 MG/1
25 TABLET ORAL ONCE AS NEEDED
Status: DISCONTINUED | OUTPATIENT
Start: 2019-04-19 | End: 2019-04-19 | Stop reason: HOSPADM

## 2019-04-19 RX ORDER — PROMETHAZINE HYDROCHLORIDE 25 MG/1
25 SUPPOSITORY RECTAL ONCE AS NEEDED
Status: DISCONTINUED | OUTPATIENT
Start: 2019-04-19 | End: 2019-04-19 | Stop reason: HOSPADM

## 2019-04-19 RX ORDER — PROMETHAZINE HYDROCHLORIDE 25 MG/ML
12.5 INJECTION, SOLUTION INTRAMUSCULAR; INTRAVENOUS ONCE AS NEEDED
Status: DISCONTINUED | OUTPATIENT
Start: 2019-04-19 | End: 2019-04-19 | Stop reason: HOSPADM

## 2019-04-19 RX ORDER — FENTANYL CITRATE 50 UG/ML
INJECTION, SOLUTION INTRAMUSCULAR; INTRAVENOUS AS NEEDED
Status: DISCONTINUED | OUTPATIENT
Start: 2019-04-19 | End: 2019-04-19 | Stop reason: SURG

## 2019-04-19 RX ORDER — ONDANSETRON 2 MG/ML
4 INJECTION INTRAMUSCULAR; INTRAVENOUS ONCE AS NEEDED
Status: DISCONTINUED | OUTPATIENT
Start: 2019-04-19 | End: 2019-04-19 | Stop reason: HOSPADM

## 2019-04-19 RX ORDER — PROPOFOL 10 MG/ML
VIAL (ML) INTRAVENOUS AS NEEDED
Status: DISCONTINUED | OUTPATIENT
Start: 2019-04-19 | End: 2019-04-19 | Stop reason: SURG

## 2019-04-19 RX ORDER — LIDOCAINE HYDROCHLORIDE 10 MG/ML
INJECTION, SOLUTION INFILTRATION; PERINEURAL AS NEEDED
Status: DISCONTINUED | OUTPATIENT
Start: 2019-04-19 | End: 2019-04-19 | Stop reason: SURG

## 2019-04-19 RX ORDER — DEXAMETHASONE SODIUM PHOSPHATE 4 MG/ML
8 INJECTION, SOLUTION INTRA-ARTICULAR; INTRALESIONAL; INTRAMUSCULAR; INTRAVENOUS; SOFT TISSUE ONCE AS NEEDED
Status: DISCONTINUED | OUTPATIENT
Start: 2019-04-19 | End: 2019-04-19

## 2019-04-19 RX ADMIN — PROPOFOL 50 MG: 10 INJECTION, EMULSION INTRAVENOUS at 08:00

## 2019-04-19 RX ADMIN — PROPOFOL 50 MG: 10 INJECTION, EMULSION INTRAVENOUS at 08:16

## 2019-04-19 RX ADMIN — LIDOCAINE HYDROCHLORIDE 50 MG: 10 INJECTION, SOLUTION INFILTRATION; PERINEURAL at 07:55

## 2019-04-19 RX ADMIN — PROPOFOL 50 MG: 10 INJECTION, EMULSION INTRAVENOUS at 08:11

## 2019-04-19 RX ADMIN — PROPOFOL 50 MG: 10 INJECTION, EMULSION INTRAVENOUS at 08:24

## 2019-04-19 RX ADMIN — FENTANYL CITRATE 50 MCG: 50 INJECTION, SOLUTION INTRAMUSCULAR; INTRAVENOUS at 08:14

## 2019-04-19 RX ADMIN — PROPOFOL 50 MG: 10 INJECTION, EMULSION INTRAVENOUS at 08:14

## 2019-04-19 RX ADMIN — PROPOFOL 50 MG: 10 INJECTION, EMULSION INTRAVENOUS at 08:19

## 2019-04-19 RX ADMIN — PROPOFOL 50 MG: 10 INJECTION, EMULSION INTRAVENOUS at 07:55

## 2019-04-19 RX ADMIN — FENTANYL CITRATE 50 MCG: 50 INJECTION, SOLUTION INTRAMUSCULAR; INTRAVENOUS at 08:08

## 2019-04-19 RX ADMIN — EPHEDRINE SULFATE 12.5 MG: 50 INJECTION INTRAMUSCULAR; INTRAVENOUS; SUBCUTANEOUS at 07:55

## 2019-04-19 RX ADMIN — DEXTROSE AND SODIUM CHLORIDE 100 ML/HR: 5; 450 INJECTION, SOLUTION INTRAVENOUS at 07:29

## 2019-04-19 RX ADMIN — PROPOFOL 50 MG: 10 INJECTION, EMULSION INTRAVENOUS at 08:09

## 2019-04-19 RX ADMIN — PROPOFOL 50 MG: 10 INJECTION, EMULSION INTRAVENOUS at 08:07

## 2019-04-19 RX ADMIN — PROPOFOL 50 MG: 10 INJECTION, EMULSION INTRAVENOUS at 08:04

## 2019-04-19 NOTE — ANESTHESIA PREPROCEDURE EVALUATION
Anesthesia Evaluation     NPO Solid Status: > 8 hours  NPO Liquid Status: > 8 hours           Airway   Mallampati: II  TM distance: >3 FB  Neck ROM: full  No difficulty expected  Dental    (+) lower dentures and upper dentures    Pulmonary - normal exam   (+) sleep apnea,   Cardiovascular - normal exam    (+) hypertension,       Neuro/Psych  GI/Hepatic/Renal/Endo    (+)  GERD,  diabetes mellitus,     Musculoskeletal     Abdominal    Substance History      OB/GYN          Other                        Anesthesia Plan    ASA 3     MAC     intravenous induction   Anesthetic plan, all risks, benefits, and alternatives have been provided, discussed and informed consent has been obtained with: patient.

## 2019-04-19 NOTE — ANESTHESIA POSTPROCEDURE EVALUATION
Patient: Beka Zavaleta    Procedure Summary     Date:  04/19/19 Room / Location:  [facility] ENDOSCOPY 2 / [facility] ENDOSCOPY    Anesthesia Start:  0753 Anesthesia Stop:  0829    Procedure:  COLONOSCOPY (N/A ) Diagnosis:       Encounter for colonoscopy due to history of adenomatous colonic polyps      (Encounter for colonoscopy due to history of adenomatous colonic polyps [Z12.11, Z86.010])    Surgeon:  Riley Osorio MD Provider:  Ramírez Gonzales CRNA    Anesthesia Type:  MAC ASA Status:  3          Anesthesia Type: MAC  Last vitals  BP   172/83 (04/19/19 0719)   Temp   98.2 °F (36.8 °C) (04/19/19 0719)   Pulse   65 (04/19/19 0719)   Resp   18 (04/19/19 0719)     SpO2   97 % (04/19/19 0719)     Post Anesthesia Care and Evaluation    Patient location during evaluation: bedside  Patient participation: complete - patient cannot participate  Level of consciousness: awake  Pain score: 0  Pain management: adequate  Airway patency: patent  Anesthetic complications: No anesthetic complications  PONV Status: none  Cardiovascular status: acceptable  Respiratory status: acceptable  Hydration status: acceptable

## 2019-04-22 LAB
LAB AP CASE REPORT: NORMAL
PATH REPORT.FINAL DX SPEC: NORMAL
PATH REPORT.GROSS SPEC: NORMAL

## 2019-04-26 ENCOUNTER — OFFICE VISIT (OUTPATIENT)
Dept: SURGERY | Facility: CLINIC | Age: 69
End: 2019-04-26

## 2019-04-26 VITALS
BODY MASS INDEX: 31.26 KG/M2 | SYSTOLIC BLOOD PRESSURE: 142 MMHG | HEART RATE: 74 BPM | WEIGHT: 230.8 LBS | TEMPERATURE: 97.2 F | HEIGHT: 72 IN | DIASTOLIC BLOOD PRESSURE: 70 MMHG

## 2019-04-26 DIAGNOSIS — Z12.11 ENCOUNTER FOR COLONOSCOPY DUE TO HISTORY OF ADENOMATOUS COLONIC POLYPS: Primary | ICD-10-CM

## 2019-04-26 DIAGNOSIS — Z86.010 ENCOUNTER FOR COLONOSCOPY DUE TO HISTORY OF ADENOMATOUS COLONIC POLYPS: Primary | ICD-10-CM

## 2019-04-26 PROCEDURE — 99212 OFFICE O/P EST SF 10 MIN: CPT | Performed by: SURGERY

## 2019-04-26 NOTE — PROGRESS NOTES
68-year-old gentleman here to follow-up after his recent colonoscopy for history of adenomatous polyps.  This was the second colonoscopy patient's had in the year.  Year ago he had a couple of adenomatous polyps removed and he had a less than optimal prep.  This time around he did much better with his prep and rated his prep is overall good.  He had a total of 6 tubular adenomatous polyps removed from his a sending and transverse colon.  I went over this pathology with him and his wife.  Is also question that his father may have had colon cancer but is not absolutely sure that.  I would recommend he have another colonoscopy in 5 years or sooner if he has any other further concerns or questions.

## 2019-04-26 NOTE — PATIENT INSTRUCTIONS

## 2019-05-09 ENCOUNTER — TELEPHONE (OUTPATIENT)
Dept: FAMILY MEDICINE CLINIC | Facility: CLINIC | Age: 69
End: 2019-05-09

## 2019-05-09 RX ORDER — SPIRONOLACTONE 25 MG/1
25 TABLET ORAL DAILY
Qty: 90 TABLET | Refills: 3 | Status: SHIPPED | OUTPATIENT
Start: 2019-05-09 | End: 2020-04-27

## 2019-05-09 NOTE — TELEPHONE ENCOUNTER
NETTA CONN IS NEEDING REFILL ON THE SPIRONOLACTONE 25MG TO BE SENT TO [facility] IN [place]  CALLBACK# [phone]

## 2019-05-21 ENCOUNTER — TELEPHONE (OUTPATIENT)
Dept: FAMILY MEDICINE CLINIC | Facility: CLINIC | Age: 69
End: 2019-05-21

## 2019-05-21 NOTE — TELEPHONE ENCOUNTER
Talia-wife called for him- said he needs refills for Oseasuvia sent to [facility] in [place]. She can be reached at [phone].

## 2019-05-28 ENCOUNTER — APPOINTMENT (OUTPATIENT)
Dept: LAB | Facility: HOSPITAL | Age: 69
End: 2019-05-28

## 2019-05-28 ENCOUNTER — OFFICE VISIT (OUTPATIENT)
Dept: FAMILY MEDICINE CLINIC | Facility: CLINIC | Age: 69
End: 2019-05-28

## 2019-05-28 VITALS
DIASTOLIC BLOOD PRESSURE: 70 MMHG | OXYGEN SATURATION: 99 % | BODY MASS INDEX: 28.43 KG/M2 | HEART RATE: 58 BPM | SYSTOLIC BLOOD PRESSURE: 124 MMHG | WEIGHT: 221.5 LBS | HEIGHT: 74 IN

## 2019-05-28 DIAGNOSIS — I10 ESSENTIAL HYPERTENSION: ICD-10-CM

## 2019-05-28 DIAGNOSIS — K21.9 GASTROESOPHAGEAL REFLUX DISEASE WITHOUT ESOPHAGITIS: ICD-10-CM

## 2019-05-28 DIAGNOSIS — E53.8 VITAMIN B 12 DEFICIENCY: ICD-10-CM

## 2019-05-28 DIAGNOSIS — E03.9 ACQUIRED HYPOTHYROIDISM: ICD-10-CM

## 2019-05-28 DIAGNOSIS — E78.00 HYPERCHOLESTEROLEMIA: ICD-10-CM

## 2019-05-28 DIAGNOSIS — Z79.4 TYPE 2 DIABETES MELLITUS WITH HYPERGLYCEMIA, WITH LONG-TERM CURRENT USE OF INSULIN (HCC): ICD-10-CM

## 2019-05-28 DIAGNOSIS — Z00.00 MEDICARE ANNUAL WELLNESS VISIT, SUBSEQUENT: Primary | ICD-10-CM

## 2019-05-28 DIAGNOSIS — E11.65 TYPE 2 DIABETES MELLITUS WITH HYPERGLYCEMIA, WITH LONG-TERM CURRENT USE OF INSULIN (HCC): ICD-10-CM

## 2019-05-28 LAB
ALBUMIN SERPL-MCNC: 4.6 G/DL (ref 3.5–5.2)
ALBUMIN UR-MCNC: <1.2 MG/L
ALBUMIN/GLOB SERPL: 1.6 G/DL
ALP SERPL-CCNC: 79 U/L (ref 39–117)
ALT SERPL W P-5'-P-CCNC: 22 U/L (ref 1–41)
ANION GAP SERPL CALCULATED.3IONS-SCNC: 10.9 MMOL/L
AST SERPL-CCNC: 21 U/L (ref 1–40)
BILIRUB SERPL-MCNC: 0.3 MG/DL (ref 0.2–1.2)
BUN BLD-MCNC: 20 MG/DL (ref 8–23)
BUN/CREAT SERPL: 19.6 (ref 7–25)
CALCIUM SPEC-SCNC: 9.5 MG/DL (ref 8.6–10.5)
CHLORIDE SERPL-SCNC: 98 MMOL/L (ref 98–107)
CHOLEST SERPL-MCNC: 160 MG/DL (ref 0–200)
CO2 SERPL-SCNC: 27.1 MMOL/L (ref 22–29)
CREAT BLD-MCNC: 1.02 MG/DL (ref 0.76–1.27)
CREAT UR-MCNC: 99.9 MG/DL
GFR SERPL CREATININE-BSD FRML MDRD: 72 ML/MIN/1.73
GLOBULIN UR ELPH-MCNC: 2.8 GM/DL
GLUCOSE BLD-MCNC: 126 MG/DL (ref 65–99)
HBA1C MFR BLD: 8.1 % (ref 4.8–5.6)
HDLC SERPL-MCNC: 45 MG/DL (ref 40–60)
LDLC SERPL CALC-MCNC: 71 MG/DL (ref 0–100)
LDLC/HDLC SERPL: 1.57 {RATIO}
MAGNESIUM SERPL-MCNC: 2.2 MG/DL (ref 1.6–2.4)
MICROALBUMIN/CREAT UR: NORMAL MG/G
POTASSIUM BLD-SCNC: 4.5 MMOL/L (ref 3.5–5.2)
PROT SERPL-MCNC: 7.4 G/DL (ref 6–8.5)
SODIUM BLD-SCNC: 136 MMOL/L (ref 136–145)
T4 FREE SERPL-MCNC: 0.93 NG/DL (ref 0.93–1.7)
TRIGL SERPL-MCNC: 222 MG/DL (ref 0–150)
TSH SERPL DL<=0.05 MIU/L-ACNC: 0.66 MIU/ML (ref 0.27–4.2)
VIT B12 BLD-MCNC: 1144 PG/ML (ref 211–946)
VLDLC SERPL-MCNC: 44.4 MG/DL (ref 5–40)

## 2019-05-28 PROCEDURE — 84439 ASSAY OF FREE THYROXINE: CPT | Performed by: FAMILY MEDICINE

## 2019-05-28 PROCEDURE — 82570 ASSAY OF URINE CREATININE: CPT | Performed by: FAMILY MEDICINE

## 2019-05-28 PROCEDURE — 82607 VITAMIN B-12: CPT | Performed by: FAMILY MEDICINE

## 2019-05-28 PROCEDURE — 80061 LIPID PANEL: CPT | Performed by: FAMILY MEDICINE

## 2019-05-28 PROCEDURE — 83036 HEMOGLOBIN GLYCOSYLATED A1C: CPT | Performed by: FAMILY MEDICINE

## 2019-05-28 PROCEDURE — 36415 COLL VENOUS BLD VENIPUNCTURE: CPT | Performed by: FAMILY MEDICINE

## 2019-05-28 PROCEDURE — 84443 ASSAY THYROID STIM HORMONE: CPT | Performed by: FAMILY MEDICINE

## 2019-05-28 PROCEDURE — 83735 ASSAY OF MAGNESIUM: CPT | Performed by: FAMILY MEDICINE

## 2019-05-28 PROCEDURE — G0439 PPPS, SUBSEQ VISIT: HCPCS | Performed by: FAMILY MEDICINE

## 2019-05-28 PROCEDURE — 99214 OFFICE O/P EST MOD 30 MIN: CPT | Performed by: FAMILY MEDICINE

## 2019-05-28 PROCEDURE — 82043 UR ALBUMIN QUANTITATIVE: CPT | Performed by: FAMILY MEDICINE

## 2019-05-28 PROCEDURE — 80053 COMPREHEN METABOLIC PANEL: CPT | Performed by: FAMILY MEDICINE

## 2019-05-28 NOTE — PROGRESS NOTES
Subjective   Beka Zavaleta is a 68 y.o. male.     Hypertension   This is a chronic problem. The current episode started more than 1 year ago. The problem is unchanged. The problem is controlled. Associated symptoms include shortness of breath (JAVIER). Pertinent negatives include no chest pain, orthopnea, palpitations, peripheral edema or PND.   Diabetes   He presents for his follow-up diabetic visit. He has type 2 diabetes mellitus. His disease course has been fluctuating. Pertinent negatives for hypoglycemia include no confusion or hunger. Pertinent negatives for diabetes include no chest pain, no foot paresthesias, no foot ulcerations, no polydipsia and no polyuria. His home blood glucose trend is fluctuating dramatically. His breakfast blood glucose is taken between 7-8 am. His breakfast blood glucose range is generally >200 mg/dl. (64 - 270  ) An ACE inhibitor/angiotensin II receptor blocker is being taken. Eye exam is current.   Coronary Artery Disease   Presents for follow-up visit. Symptoms include shortness of breath (JAVIER). Pertinent negatives include no chest pain, chest pressure, chest tightness, leg swelling, palpitations or weight gain. Risk factors include hyperlipidemia.   Heartburn   He reports no chest pain, no dysphagia or no heartburn. This is a chronic problem. The current episode started more than 1 year ago. The problem occurs occasionally.   Hyperlipidemia   This is a chronic problem. The current episode started more than 1 year ago. The problem is controlled. Recent lipid tests were reviewed and are normal. Associated symptoms include shortness of breath (JAVIER). Pertinent negatives include no chest pain or myalgias.        The following portions of the patient's history were reviewed and updated as appropriate: allergies, current medications, past family history, past medical history, past social history, past surgical history and problem list.    Review of Systems   Constitutional: Negative  for weight gain.   Respiratory: Positive for shortness of breath (JAVIER). Negative for chest tightness.    Cardiovascular: Negative for chest pain, palpitations, orthopnea, leg swelling and PND.   Gastrointestinal: Negative for dysphagia and heartburn.   Endocrine: Negative for polydipsia and polyuria.   Musculoskeletal: Negative for myalgias.   Psychiatric/Behavioral: Negative for confusion.       Objective   Physical Exam   Constitutional: He is oriented to person, place, and time. He appears well-developed and well-nourished. No distress.   HENT:   Head: Normocephalic and atraumatic.   Cardiovascular: Normal rate, regular rhythm, normal heart sounds and intact distal pulses. Exam reveals no gallop and no friction rub.   No murmur heard.  Pulmonary/Chest: Effort normal and breath sounds normal. No respiratory distress. He has no wheezes. He has no rales. He exhibits no tenderness.   Abdominal: Soft. Normal appearance. There is no tenderness.   Musculoskeletal: He exhibits no edema.    Beka had a diabetic foot exam performed (callus noted) today.   During the foot exam he had a monofilament test performed (normal).  Vascular Status -  His right foot exhibits no edema. His left foot exhibits no edema.  Neurological: He is alert and oriented to person, place, and time.   Skin: Skin is warm and dry. He is not diaphoretic.   Psychiatric: He has a normal mood and affect. His behavior is normal. Judgment and thought content normal.   Nursing note and vitals reviewed.      Assessment/Plan   Problems Addressed this Visit        Cardiovascular and Mediastinum    Hypercholesterolemia    Essential hypertension       Digestive    GERD (gastroesophageal reflux disease)       Endocrine    Type 2 diabetes mellitus without complication, without long-term current use of insulin (CMS/[facility])       Other    Medicare annual wellness visit, subsequent - Primary               c elsy padilla 180 -190. Call glu 1 week

## 2019-05-28 NOTE — PROGRESS NOTES
QUICK REFERENCE INFORMATION:  The ABCs of the Annual Wellness Visit    Subsequent Medicare Wellness Visit     HEALTH RISK ASSESSMENT    : 1950    Recent Hospitalizations:  Recently treated at the following:  Other: [place].  [facility]      Current Medical Providers:  Patient Care Team:  Fede Hawkins MD as PCP - General (Family Medicine)  Fede Hawkins MD as PCP - Claims Attributed  Riley Osorio MD as Surgeon (General Surgery)  Mario Smart MD as Surgeon (Orthopedic Surgery)  Tam Daley MD as Consulting Physician (Ophthalmology)  Ej Hatch MD as Consulting Physician (Interventional Cardiology)        Smoking Status:  Social History     Tobacco Use   Smoking Status Former Smoker   • Types: Cigarettes   • Last attempt to quit: 1988   • Years since quittin.4   Smokeless Tobacco Former User   • Types: Chew       Alcohol Consumption:  Social History     Substance and Sexual Activity   Alcohol Use No       Depression Screen:   PHQ-2/PHQ-9 Depression Screening 2019   Little interest or pleasure in doing things 0   Feeling down, depressed, or hopeless 0   Trouble falling or staying asleep, or sleeping too much -   Feeling tired or having little energy -   Poor appetite or overeating -   Feeling bad about yourself - or that you are a failure or have let yourself or your family down -   Trouble concentrating on things, such as reading the newspaper or watching television -   Moving or speaking so slowly that other people could have noticed. Or the opposite - being so fidgety or restless that you have been moving around a lot more than usual -   Thoughts that you would be better off dead, or of hurting yourself in some way -   Total Score 0   If you checked off any problems, how difficult have these problems made it for you to do your work, take care of things at home, or get along with other people? -       Health Habits and Functional and Cognitive  Screening:  Functional & Cognitive Status 5/24/2018   Do you have difficulty preparing food and eating? No   Do you have difficulty bathing yourself, getting dressed or grooming yourself? No   Do you have difficulty using the toilet? No   Do you have difficulty moving around from place to place? No   Do you have trouble with steps or getting out of a bed or a chair? No   In the past year have you fallen or experienced a near fall? No   Current Diet Diabetic Diet   Dental Exam Up to date   Eye Exam Up to date   Exercise (times per week) 0 times per week   Do you need help using the phone?  No   Are you deaf or do you have serious difficulty hearing?  Yes   Do you need help with transportation? No   Do you need help shopping? No   Do you need help preparing meals?  No   Do you need help with housework?  No   Do you need help with laundry? No   Do you need help taking your medications? No   Do you need help managing money? No   Do you ever drive or ride in a car without wearing a seat belt? No   Have you felt unusual stress, anger or loneliness in the last month? No   Who do you live with? Spouse   If you need help, do you have trouble finding someone available to you? No   Do you have difficulty concentrating, remembering or making decisions? No           Does the patient have evidence of cognitive impairment? No    Asiprin use counseling: Taking ASA appropriately as indicated      Recent Lab Results:       Lab Results   Component Value Date    HGBA1C 8.6 (H) 11/26/2018     Lab Results   Component Value Date    CHOL 140 11/26/2018    TRIG 256 (H) 11/26/2018    HDL 37 (L) 11/26/2018    LDLHDL 1.40 11/26/2018           Age-appropriate Screening Schedule:  Refer to the list below for future screening recommendations based on patient's age, sex and/or medical conditions. Orders for these recommended tests are listed in the plan section. The patient has been provided with a written plan.    Health Maintenance   Topic Date  Due   • ZOSTER VACCINE (2 of 3) 03/23/2009   • HEMOGLOBIN A1C  05/26/2019   • INFLUENZA VACCINE  08/01/2019   • DIABETIC EYE EXAM  10/17/2019   • LIPID PANEL  11/26/2019   • URINE MICROALBUMIN  11/26/2019   • DIABETIC FOOT EXAM  02/26/2020   • TDAP/TD VACCINES (2 - Td) 04/28/2020   • COLONOSCOPY  04/19/2029   • PNEUMOCOCCAL VACCINES (65+ LOW/MEDIUM RISK)  Completed        Subjective   History of Present Illness    Beka Zavaleta is a 68 y.o. male who presents for an Annual Wellness Visit.    The following portions of the patient's history were reviewed and updated as appropriate: allergies, current medications, past family history, past medical history, past social history, past surgical history and problem list.    Outpatient Medications Prior to Visit   Medication Sig Dispense Refill   • amLODIPine (NORVASC) 10 MG tablet Take 1 tablet by mouth Daily. 90 tablet 3   • aspirin 81 MG tablet Take 81 mg by mouth daily.     • atenolol (TENORMIN) 100 MG tablet Take 1 tablet by mouth Daily. 90 tablet 3   • atorvastatin (LIPITOR) 80 MG tablet Take 1 tablet by mouth Daily. 90 tablet 3   • carBAMazepine XR (TEGretol  XR) 100 MG 12 hr tablet TAKE 4 TABLETS BY MOUTH EVERY MORNING , 3 AT LUNCH AND TAKE 3 TABLETS AT BEDTIME 300 tablet 4   • clopidogrel (PLAVIX) 75 MG tablet Take 1 tablet by mouth Daily. 30 tablet 5   • colestipol (COLESTID) 1 g tablet Take 2 tablets by mouth 2 (Two) Times a Day. 360 tablet 3   • Cyanocobalamin (B-12 COMPLIANCE INJECTION) 1000 MCG/ML kit Inject 1 mL as directed Every 28 (Twenty-Eight) Days. Intramuscular 1 kit 12   • doxazosin (CARDURA) 4 MG tablet Take 1 tablet by mouth Every Night. 90 tablet 3   • glucose blood (ACCU-CHEK ENDER) test strip Test sugars up to 4 times daily as directed by Physician.     • insulin glargine (LANTUS) 100 UNIT/ML injection Inject 190 Units under the skin into the appropriate area as directed Daily. (Dosage Increased due to being on Prednisone) 50 mL 5   • Insulin  "Syringe-Needle U-100 30G X 7/16\" 1 ML misc daily.     • isosorbide mononitrate (IMDUR) 30 MG 24 hr tablet Take 1 tablet by mouth Daily. Take 1/2 pill once a day (Patient taking differently: Take 30 mg by mouth Daily.) 30 tablet 5   • lisinopril (PRINIVIL,ZESTRIL) 40 MG tablet Take 1 tablet by mouth 2 (Two) Times a Day. 180 tablet 1   • magnesium oxide (MAGOX) 400 (241.3 MG) MG tablet tablet Take 400 mg by mouth Daily.     • metFORMIN (GLUCOPHAGE) 500 MG tablet Take 1 tablet by mouth 2 (Two) Times a Day With Meals. 180 tablet 3   • Multiple Vitamins-Minerals (ICAPS PO) Take  by mouth daily.     • nitroglycerin (NITROSTAT) 0.4 MG SL tablet Place 0.4 mg under the tongue as needed for chest pain. repeat X 1 in 5 min. if not relieved and then got to ER or call an ambulance     • omeprazole (priLOSEC) 40 MG capsule Take 1 capsule by mouth Daily. 30 capsule 5   • promethazine-dextromethorphan (PROMETHAZINE-DM) 6.25-15 MG/5ML syrup Take 5 mL by mouth At Night As Needed for Cough. 120 mL 0   • ranolazine (RANEXA) 500 MG 12 hr tablet Take 1 tablet by mouth Every 12 (Twelve) Hours.     • RELION ULTRA THIN PLUS LANCETS misc test once daily     • SITagliptin (JANUVIA) 100 MG tablet Take 1 tablet by mouth Daily. 30 tablet 5   • spironolactone (ALDACTONE) 25 MG tablet Take 1 tablet by mouth Daily. 90 tablet 3     Facility-Administered Medications Prior to Visit   Medication Dose Route Frequency Provider Last Rate Last Dose   • cyanocobalamin injection 1,000 mcg  1,000 mcg Intramuscular Q28 Days Sundeep Castillo MD   1,000 mcg at 09/05/18 1116       Patient Active Problem List   Diagnosis   • Type II diabetes mellitus, uncontrolled (CMS/[facility])   • Obstructive sleep apnea syndrome   • Impotence   • Hypercholesterolemia   • History of trigeminal neuralgia   • History of colon polyps 03/23/2018   • GERD (gastroesophageal reflux disease)   • Essential hypertension   • Coronary atherosclerosis   • Vitamin B 12 deficiency   • 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, mild stage   • Hepatic steatosis   • Nuclear cataract   • Cortical age-related cataract   • Palpitation   • Cellulitis of right hand   • Cellulitis of finger of right hand   • Vitamin deficiency   • Type 2 diabetes mellitus (CMS/HCC)   • Plantar fasciitis   • Malaise and fatigue   • Hyperkalemia   • History of Holter monitoring   • History of echocardiogram   • Goiter   • Encounter for screening for malignant neoplasm of prostate   • Diverticular disease of colon   • Chronic left shoulder pain   • Type 2 diabetes mellitus without complication, without long-term current use of insulin (CMS/HCC)   • Rotator cuff syndrome   • Impingement syndrome of left shoulder   • Encounter for colonoscopy due to history of adenomatous colonic polyps   • Vitamin B 12 deficiency       Advance Care Planning:  Patient does not have an advance directive - information provided to the patient today    Identification of Risk Factors:  Risk factors include: weight .    Review of Systems    Compared to one year ago, the patient feels his physical health is the same.  Compared to one year ago, the patient feels his mental health is worse.    Objective     Physical Exam    Vitals:    05/28/19 0906   BP: 124/70   Pulse: 58   SpO2: 99%   Weight: 100 kg (221 lb 8 oz)   Height: 188 cm (74\")   PainSc: 0-No pain       Patient's Body mass index is 28.44 kg/m². BMI is above normal parameters. Recommendations include: educational material.      Assessment/Plan   Patient Self-Management and Personalized Health Advice  The patient has been provided with information about: diet and preventive services including:   · Advance directive.    Visit Diagnoses:  No diagnosis found.    No orders of the defined types were placed in this encounter.      Outpatient Encounter Medications as of 5/28/2019   Medication Sig Dispense Refill   • amLODIPine (NORVASC) 10 MG tablet Take 1 tablet by mouth Daily. 90 tablet 3   • aspirin " "81 MG tablet Take 81 mg by mouth daily.     • atenolol (TENORMIN) 100 MG tablet Take 1 tablet by mouth Daily. 90 tablet 3   • atorvastatin (LIPITOR) 80 MG tablet Take 1 tablet by mouth Daily. 90 tablet 3   • carBAMazepine XR (TEGretol  XR) 100 MG 12 hr tablet TAKE 4 TABLETS BY MOUTH EVERY MORNING , 3 AT LUNCH AND TAKE 3 TABLETS AT BEDTIME 300 tablet 4   • clopidogrel (PLAVIX) 75 MG tablet Take 1 tablet by mouth Daily. 30 tablet 5   • colestipol (COLESTID) 1 g tablet Take 2 tablets by mouth 2 (Two) Times a Day. 360 tablet 3   • Cyanocobalamin (B-12 COMPLIANCE INJECTION) 1000 MCG/ML kit Inject 1 mL as directed Every 28 (Twenty-Eight) Days. Intramuscular 1 kit 12   • doxazosin (CARDURA) 4 MG tablet Take 1 tablet by mouth Every Night. 90 tablet 3   • glucose blood (ACCU-CHEK ENDER) test strip Test sugars up to 4 times daily as directed by Physician.     • insulin glargine (LANTUS) 100 UNIT/ML injection Inject 190 Units under the skin into the appropriate area as directed Daily. (Dosage Increased due to being on Prednisone) 50 mL 5   • Insulin Syringe-Needle U-100 30G X 7/16\" 1 ML misc daily.     • isosorbide mononitrate (IMDUR) 30 MG 24 hr tablet Take 1 tablet by mouth Daily. Take 1/2 pill once a day (Patient taking differently: Take 30 mg by mouth Daily.) 30 tablet 5   • lisinopril (PRINIVIL,ZESTRIL) 40 MG tablet Take 1 tablet by mouth 2 (Two) Times a Day. 180 tablet 1   • magnesium oxide (MAGOX) 400 (241.3 MG) MG tablet tablet Take 400 mg by mouth Daily.     • metFORMIN (GLUCOPHAGE) 500 MG tablet Take 1 tablet by mouth 2 (Two) Times a Day With Meals. 180 tablet 3   • Multiple Vitamins-Minerals (ICAPS PO) Take  by mouth daily.     • nitroglycerin (NITROSTAT) 0.4 MG SL tablet Place 0.4 mg under the tongue as needed for chest pain. repeat X 1 in 5 min. if not relieved and then got to ER or call an ambulance     • omeprazole (priLOSEC) 40 MG capsule Take 1 capsule by mouth Daily. 30 capsule 5   • " promethazine-dextromethorphan (PROMETHAZINE-DM) 6.25-15 MG/5ML syrup Take 5 mL by mouth At Night As Needed for Cough. 120 mL 0   • ranolazine (RANEXA) 500 MG 12 hr tablet Take 1 tablet by mouth Every 12 (Twelve) Hours.     • RELION ULTRA THIN PLUS LANCETS misc test once daily     • SITagliptin (JANUVIA) 100 MG tablet Take 1 tablet by mouth Daily. 30 tablet 5   • spironolactone (ALDACTONE) 25 MG tablet Take 1 tablet by mouth Daily. 90 tablet 3     Facility-Administered Encounter Medications as of 5/28/2019   Medication Dose Route Frequency Provider Last Rate Last Dose   • cyanocobalamin injection 1,000 mcg  1,000 mcg Intramuscular Q28 Days Sundeep Castillo MD   1,000 mcg at 09/05/18 1116       Reviewed use of high risk medication in the elderly: yes  Reviewed for potential of harmful drug interactions in the elderly: yes    Follow Up:  No Follow-up on file.     An After Visit Summary and PPPS with all of these plans were given to the patient.

## 2019-05-29 NOTE — PROGRESS NOTES
Hemoglobin A1c was high as expected at 8.1.  We just increase his Lantus in the office.  He was supposed to get some fastings and call him with the results.  Asked him about that please.

## 2019-05-30 ENCOUNTER — TELEPHONE (OUTPATIENT)
Dept: FAMILY MEDICINE CLINIC | Facility: CLINIC | Age: 69
End: 2019-05-30

## 2019-05-30 NOTE — PROGRESS NOTES
Per Dr. Hawkins, Mr & Mrs Zavaleta have been called with recent lab results & recommendations.  Continue current medications and follow-up as planned or sooner if any problems.    DR. Hawkins,    Ms. Zavaleta states they were not told to increase his insulin on Tuesday 05/28/19.   She states he is already taking Lantus 190 units   She states his Blood Sugars for the past 2 days have been in the 300's on Wednesday and 234 today.  [place] to give you.    Please Advise.  Thank you

## 2019-05-30 NOTE — TELEPHONE ENCOUNTER
Per Dr. Hawkins, Mr & Mrs Zavaleta have been called with recent lab results & recommendations.  Continue current medications and follow-up as planned or sooner if any problems.    DR. Hawkins,    Ms. Zavaleta states they were not told to increase his insulin on Tuesday 05/28/19.   She states he is already taking Lantus 190 units   She states his Blood Sugars for the past 2 days have been in the 300's on Wednesday and 234 today.  [place] other readings to give you.    Please Advise.  Thank you    ----- Message from Fede Hawkins MD sent at 5/29/2019  3:24 PM CDT -----  Hemoglobin A1c was high as expected at 8.1.  We just increase his Lantus in the office.  He was supposed to get some fastings and call him with the results.  Asked him about that please.

## 2019-05-30 NOTE — PROGRESS NOTES
Increase insulin from 190 units today up to 200 units a day.  Call fasting and 4 PM in [place]

## 2019-05-31 ENCOUNTER — TELEPHONE (OUTPATIENT)
Dept: FAMILY MEDICINE CLINIC | Facility: CLINIC | Age: 69
End: 2019-05-31

## 2019-05-31 DIAGNOSIS — E11.9 TYPE 2 DIABETES MELLITUS WITHOUT COMPLICATION (HCC): ICD-10-CM

## 2019-05-31 RX ORDER — INSULIN GLARGINE 100 [IU]/ML
200 INJECTION, SOLUTION SUBCUTANEOUS DAILY
Qty: 60 ML | Refills: 0 | Status: SHIPPED | OUTPATIENT
Start: 2019-05-31 | End: 2019-06-17

## 2019-05-31 NOTE — PROGRESS NOTES
Per Dr. Hawkins, Mr. Zavaleta  has been called with recent lab results & recommendations.  Continue current medications and follow-up as planned or sooner if any problems.

## 2019-05-31 NOTE — TELEPHONE ENCOUNTER
Per Dr. Hawkins, Mr. Zavaleta  has been called with recent lab results & recommendations.  Continue current medications and follow-up as planned or sooner if any problems.        ----- Message from Fede Hawkins MD sent at 5/30/2019  3:26 PM CDT -----  Increase insulin from 190 units today up to 200 units a day.  Call fasting and 4 PM in 1 [place]

## 2019-06-07 ENCOUNTER — TELEPHONE (OUTPATIENT)
Dept: FAMILY MEDICINE CLINIC | Facility: CLINIC | Age: 69
End: 2019-06-07

## 2019-06-07 NOTE — TELEPHONE ENCOUNTER
HERE ARE SUGAR READINGS FOR NETTA CONN  5/31  GJ=539  FQ=151  6/01  WB=497  MT=890  6/02  YU=795  DQ=437  6/03  ZD=460  LM=579  6/04  SR=582  JP=063  6/05  ZD=842  N/A  6/06  OQ=893  RD=393  6/07=  CALLBACK#

## 2019-06-14 ENCOUNTER — TELEPHONE (OUTPATIENT)
Dept: FAMILY MEDICINE CLINIC | Facility: CLINIC | Age: 69
End: 2019-06-14

## 2019-06-14 RX ORDER — CARBAMAZEPINE 100 MG/1
TABLET, EXTENDED RELEASE ORAL
Qty: 300 TABLET | Refills: 4 | Status: SHIPPED | OUTPATIENT
Start: 2019-06-14 | End: 2019-11-26 | Stop reason: SDUPTHER

## 2019-06-14 NOTE — TELEPHONE ENCOUNTER
NETTA CONN HAS CALLED BACK WITH BLOOD SUGAR READINGS FROM THIS WK  6/9    AM=82   PH=383  6/10  YF=100 PM=98  6/11  XJ=998 KI=276  6/12  OI=243 EI=981  6/13  BC=660 AD=478  6/14  NM=032  CALLBACK# [phone]

## 2019-06-17 ENCOUNTER — TELEPHONE (OUTPATIENT)
Dept: FAMILY MEDICINE CLINIC | Facility: CLINIC | Age: 69
End: 2019-06-17

## 2019-06-17 DIAGNOSIS — E11.9 TYPE 2 DIABETES MELLITUS WITHOUT COMPLICATION (HCC): ICD-10-CM

## 2019-06-17 RX ORDER — INSULIN GLARGINE 100 [IU]/ML
190 INJECTION, SOLUTION SUBCUTANEOUS DAILY
Qty: 60 ML | Refills: 3 | Status: SHIPPED | OUTPATIENT
Start: 2019-06-17 | End: 2019-12-27 | Stop reason: SDUPTHER

## 2019-06-17 NOTE — TELEPHONE ENCOUNTER
Per Dr. Hawkins,    I have called the Zavaleta's back and advised them that per Dr. Hawkins it is OK to ct his Insulin back to 190 units.  I also ask the patient to call us with so,me of his low blood sugar readings when she can get them so we can document them.  She was also ask to check his blood sugars fasting and 4 PM for a week and call us the results.    She states understanding.   New Script sent to the pharmacy

## 2019-06-17 NOTE — TELEPHONE ENCOUNTER
I called Ms. Zavaleta and jessica them to stay with his current dose of Insulin    She states that he has had some low blood sugars and he is wanting to drop his insulin back to 190 units daily.  She said it dropped this morning after breakfast alina he didn't feel like taking it.   I ask her what some of the other readings were she said she didn't have them with her but would get them and call us back  I told her you would want documentation before making any adjustments.

## 2019-06-17 NOTE — TELEPHONE ENCOUNTER
Talia-wife called for him .She said he is having probs with his sugar bottoming out-said Dr Hawkins had upped his insulin to 200 and and wants to know if he can go back to 190. She can be reached at [phone].

## 2019-06-18 ENCOUNTER — TELEPHONE (OUTPATIENT)
Dept: FAMILY MEDICINE CLINIC | Facility: CLINIC | Age: 69
End: 2019-06-18

## 2019-06-18 RX ORDER — CLOPIDOGREL BISULFATE 75 MG/1
75 TABLET ORAL DAILY
Qty: 30 TABLET | Refills: 5 | Status: SHIPPED | OUTPATIENT
Start: 2019-06-18 | End: 2019-11-18 | Stop reason: SDUPTHER

## 2019-06-18 NOTE — TELEPHONE ENCOUNTER
Dr. Hawkins,      We just lowered his Insulin yesterday from 200 units down to 190.  These low blood sugar readings were from when he was taking the 200 units of Insulin daily. (confirmed that by talking to Ms Zavaleta)    As of this time he has not had any low BS readings while on 190 units.   I advise them to stay at the 190 units, call us his blood sugar readings in a week Fasting & 4PM readings and call us before then if he has readings that drop below 60.      Please Advise if this is what you meant for him to do.

## 2019-06-18 NOTE — TELEPHONE ENCOUNTER
CHARLES CONN  CALLED FOR NETTA THEY ARE LETTING DR GHOTRA KNOW..he HAS HAD THE LOWEST BLOOD SUGAR READINGS IS  66..82.. AND YESTERDAY HE HAD 2 EPPISODES THAT HE GOT REAL SHAKY AND SWEATING BUT DID NOT TAKE THE READINGS  CALLBACK

## 2019-06-26 ENCOUNTER — TELEPHONE (OUTPATIENT)
Dept: FAMILY MEDICINE CLINIC | Facility: CLINIC | Age: 69
End: 2019-06-26

## 2019-06-26 NOTE — TELEPHONE ENCOUNTER
Talia-wife called in blood sugar readings for him-said he tested at 7 am and then at 5 pm every day. 6/19-  PM-180  6/20-  PM-124   6/21-7AM--195  12:30 PM- 72  5:30 PM-113  6/22-    6/23-  PM-215   6/24-  PM-136  6/25- AM 82  PM-135 AND 6/25-. He can be reached at [phone].

## 2019-06-26 NOTE — TELEPHONE ENCOUNTER
Per Dr. Hawkins, Ms. Zavaleta has been called and advised to continue the current dose of Insulin, tighten up on his diet and follow-up as planned.  They are to continue to check his blood sugars and call us if they start going up or start dropping again.

## 2019-07-03 ENCOUNTER — TELEPHONE (OUTPATIENT)
Dept: FAMILY MEDICINE CLINIC | Facility: CLINIC | Age: 69
End: 2019-07-03

## 2019-07-03 RX ORDER — MONTELUKAST SODIUM 4 MG/1
2 TABLET, CHEWABLE ORAL 2 TIMES DAILY
Qty: 360 TABLET | Refills: 3 | Status: SHIPPED | OUTPATIENT
Start: 2019-07-03 | End: 2020-06-29

## 2019-07-03 RX ORDER — MONTELUKAST SODIUM 4 MG/1
TABLET, CHEWABLE ORAL
Qty: 360 TABLET | Refills: 3 | OUTPATIENT
Start: 2019-07-03

## 2019-07-03 NOTE — TELEPHONE ENCOUNTER
Pt needs refill of Colestipol 1g 90 day supply sent to [facility] in [place].

## 2019-07-11 ENCOUNTER — TELEPHONE (OUTPATIENT)
Dept: FAMILY MEDICINE CLINIC | Facility: CLINIC | Age: 69
End: 2019-07-11

## 2019-07-11 RX ORDER — LISINOPRIL 40 MG/1
40 TABLET ORAL 2 TIMES DAILY
Qty: 180 TABLET | Refills: 1 | Status: SHIPPED | OUTPATIENT
Start: 2019-07-11 | End: 2020-01-10 | Stop reason: SDUPTHER

## 2019-07-11 NOTE — TELEPHONE ENCOUNTER
NETTA CONN IS NEEDING REFILL ON LISINOPRIL 40MG TO BE SENT TO [place] [facility]  [facility]# [phone]

## 2019-08-28 ENCOUNTER — OFFICE VISIT (OUTPATIENT)
Dept: FAMILY MEDICINE CLINIC | Facility: CLINIC | Age: 69
End: 2019-08-28

## 2019-08-28 VITALS
DIASTOLIC BLOOD PRESSURE: 60 MMHG | WEIGHT: 225 LBS | OXYGEN SATURATION: 98 % | BODY MASS INDEX: 28.88 KG/M2 | HEART RATE: 56 BPM | SYSTOLIC BLOOD PRESSURE: 120 MMHG | HEIGHT: 74 IN

## 2019-08-28 DIAGNOSIS — I10 ESSENTIAL HYPERTENSION: Primary | ICD-10-CM

## 2019-08-28 DIAGNOSIS — E11.65 UNCONTROLLED TYPE 2 DIABETES MELLITUS WITH HYPERGLYCEMIA (HCC): ICD-10-CM

## 2019-08-28 DIAGNOSIS — I25.10 ATHEROSCLEROSIS OF NATIVE CORONARY ARTERY OF NATIVE HEART WITHOUT ANGINA PECTORIS: ICD-10-CM

## 2019-08-28 DIAGNOSIS — K21.9 GASTROESOPHAGEAL REFLUX DISEASE WITHOUT ESOPHAGITIS: ICD-10-CM

## 2019-08-28 PROCEDURE — 99214 OFFICE O/P EST MOD 30 MIN: CPT | Performed by: FAMILY MEDICINE

## 2019-08-28 NOTE — PROGRESS NOTES
Subjective   Beka Zavaleta is a 68 y.o. male.     Hypertension   This is a chronic problem. The current episode started more than 1 year ago. The problem is unchanged. The problem is controlled. Pertinent negatives include no chest pain, orthopnea, palpitations, peripheral edema, PND or shortness of breath.   Diabetes   He presents for his follow-up diabetic visit. He has type 2 diabetes mellitus. His disease course has been fluctuating. Pertinent negatives for hypoglycemia include no confusion or hunger. Pertinent negatives for diabetes include no chest pain, no foot paresthesias, no foot ulcerations, no polydipsia and no polyuria. His home blood glucose trend is fluctuating dramatically. His breakfast blood glucose is taken between 7-8 am. His breakfast blood glucose range is generally 180-200 mg/dl. (    ) An ACE inhibitor/angiotensin II receptor blocker is being taken. Eye exam is current.   Coronary Artery Disease   Presents for follow-up visit. Pertinent negatives include no chest pain, chest pressure, chest tightness, leg swelling, palpitations, shortness of breath or weight gain. Risk factors include hyperlipidemia.   Heartburn   He reports no chest pain, no dysphagia or no heartburn. This is a chronic problem. The current episode started more than 1 year ago. The problem occurs occasionally.   Hyperlipidemia   This is a chronic problem. The current episode started more than 1 year ago. The problem is controlled. Recent lipid tests were reviewed and are normal. Pertinent negatives include no chest pain, myalgias or shortness of breath.        The following portions of the patient's history were reviewed and updated as appropriate: allergies, current medications, past family history, past medical history, past social history, past surgical history and problem list.    Review of Systems   Constitutional: Negative for weight gain.   Respiratory: Negative for chest tightness and shortness of breath.     Cardiovascular: Negative for chest pain, palpitations, orthopnea, leg swelling and PND.   Gastrointestinal: Negative for dysphagia and heartburn.   Endocrine: Negative for polydipsia and polyuria.   Musculoskeletal: Negative for myalgias.   Psychiatric/Behavioral: Negative for confusion.       Objective   Physical Exam   Constitutional: He is oriented to person, place, and time. He appears well-developed and well-nourished. No distress.   HENT:   Head: Normocephalic and atraumatic.   Cardiovascular: Normal rate, regular rhythm, normal heart sounds and intact distal pulses. Exam reveals no gallop and no friction rub.   No murmur heard.  Pulmonary/Chest: Effort normal and breath sounds normal. No respiratory distress. He has no wheezes. He has no rales. He exhibits no tenderness.   Abdominal: Soft. Normal appearance. There is no tenderness.   Musculoskeletal: He exhibits no edema.    Beka had a diabetic foot exam performed (callus noted) today.   During the foot exam he had a monofilament test performed (normal).  Vascular Status -  His right foot exhibits no edema. His left foot exhibits no edema.  Neurological: He is alert and oriented to person, place, and time.   Skin: Skin is warm and dry. He is not diaphoretic.   Psychiatric: He has a normal mood and affect. His behavior is normal. Judgment and thought content normal.   Nursing note and vitals reviewed.      Assessment/Plan   Problems Addressed this Visit        Cardiovascular and Mediastinum    Coronary atherosclerosis (Chronic)    Essential hypertension - Primary       Digestive    GERD (gastroesophageal reflux disease)       Endocrine    Type II diabetes mellitus, uncontrolled (CMS/[facility])          Has history of recurrent hypoglycemia with tighter controll

## 2019-09-18 RX ORDER — CYANOCOBALAMIN 1000 UG/ML
INJECTION, SOLUTION INTRAMUSCULAR; SUBCUTANEOUS
Qty: 1 ML | Refills: 12 | Status: SHIPPED | OUTPATIENT
Start: 2019-09-18 | End: 2022-11-16 | Stop reason: SDUPTHER

## 2019-10-07 ENCOUNTER — TELEPHONE (OUTPATIENT)
Dept: FAMILY MEDICINE CLINIC | Facility: CLINIC | Age: 69
End: 2019-10-07

## 2019-10-07 RX ORDER — ATORVASTATIN CALCIUM 80 MG/1
TABLET, FILM COATED ORAL
Qty: 90 TABLET | Refills: 3 | Status: SHIPPED | OUTPATIENT
Start: 2019-10-07 | End: 2020-08-27 | Stop reason: SDUPTHER

## 2019-10-07 RX ORDER — OMEPRAZOLE 40 MG/1
40 CAPSULE, DELAYED RELEASE ORAL DAILY
Qty: 30 CAPSULE | Refills: 5 | Status: SHIPPED | OUTPATIENT
Start: 2019-10-07 | End: 2020-04-06 | Stop reason: SDUPTHER

## 2019-10-07 NOTE — TELEPHONE ENCOUNTER
Returned pt's voice mail  She asked for lab appointment for October 25 early am  Have her set up at 6:30.  Asked pt to call me back to confirm if this would work   Talia-wife called for him-said he needs refills for omeprazole 40 mg sent to [facility] in [place]. He sees Dr Hawkins and can be reached at [phone].

## 2019-10-31 ENCOUNTER — TELEPHONE (OUTPATIENT)
Dept: FAMILY MEDICINE CLINIC | Facility: CLINIC | Age: 69
End: 2019-10-31

## 2019-10-31 RX ORDER — DOXAZOSIN MESYLATE 4 MG/1
4 TABLET ORAL NIGHTLY
Qty: 90 TABLET | Refills: 3 | Status: SHIPPED | OUTPATIENT
Start: 2019-10-31 | End: 2020-08-27 | Stop reason: SDUPTHER

## 2019-11-18 ENCOUNTER — TELEPHONE (OUTPATIENT)
Dept: FAMILY MEDICINE CLINIC | Facility: CLINIC | Age: 69
End: 2019-11-18

## 2019-11-18 RX ORDER — CLOPIDOGREL BISULFATE 75 MG/1
TABLET ORAL
Qty: 90 TABLET | Refills: 1 | Status: SHIPPED | OUTPATIENT
Start: 2019-11-18 | End: 2020-02-14

## 2019-11-18 NOTE — TELEPHONE ENCOUNTER
Requested Refills Sent  Plavix had already been sent earlier this morning, the pharmacy had already requested the refill

## 2019-11-18 NOTE — TELEPHONE ENCOUNTER
Talia-wife called for him- he needs refills for Clopidogrel -90 day supply and also for Januvia-30 day supply with refills sent to [facility] in [place]. He sees Dr Hawkins and can be reached at [phone].

## 2019-11-26 ENCOUNTER — OFFICE VISIT (OUTPATIENT)
Dept: FAMILY MEDICINE CLINIC | Facility: CLINIC | Age: 69
End: 2019-11-26

## 2019-11-26 ENCOUNTER — APPOINTMENT (OUTPATIENT)
Dept: LAB | Facility: HOSPITAL | Age: 69
End: 2019-11-26

## 2019-11-26 VITALS
SYSTOLIC BLOOD PRESSURE: 120 MMHG | HEIGHT: 74 IN | WEIGHT: 239 LBS | OXYGEN SATURATION: 98 % | DIASTOLIC BLOOD PRESSURE: 62 MMHG | HEART RATE: 70 BPM | BODY MASS INDEX: 30.67 KG/M2

## 2019-11-26 DIAGNOSIS — I10 ESSENTIAL HYPERTENSION: Primary | ICD-10-CM

## 2019-11-26 DIAGNOSIS — E78.00 HYPERCHOLESTEROLEMIA: ICD-10-CM

## 2019-11-26 DIAGNOSIS — E66.09 CLASS 1 OBESITY DUE TO EXCESS CALORIES WITH SERIOUS COMORBIDITY AND BODY MASS INDEX (BMI) OF 30.0 TO 30.9 IN ADULT: ICD-10-CM

## 2019-11-26 DIAGNOSIS — K21.9 GASTROESOPHAGEAL REFLUX DISEASE WITHOUT ESOPHAGITIS: ICD-10-CM

## 2019-11-26 DIAGNOSIS — E11.65 UNCONTROLLED TYPE 2 DIABETES MELLITUS WITH HYPERGLYCEMIA (HCC): ICD-10-CM

## 2019-11-26 DIAGNOSIS — E03.9 ACQUIRED HYPOTHYROIDISM: ICD-10-CM

## 2019-11-26 DIAGNOSIS — I25.10 ATHEROSCLEROSIS OF NATIVE CORONARY ARTERY OF NATIVE HEART WITHOUT ANGINA PECTORIS: ICD-10-CM

## 2019-11-26 DIAGNOSIS — L91.8 INFLAMED SKIN TAG: ICD-10-CM

## 2019-11-26 LAB
ALBUMIN SERPL-MCNC: 4.6 G/DL (ref 3.5–5.2)
ALBUMIN UR-MCNC: <1.2 MG/DL
ALBUMIN/GLOB SERPL: 1.5 G/DL
ALP SERPL-CCNC: 79 U/L (ref 39–117)
ALT SERPL W P-5'-P-CCNC: 18 U/L (ref 1–41)
ANION GAP SERPL CALCULATED.3IONS-SCNC: 10.4 MMOL/L (ref 5–15)
AST SERPL-CCNC: 18 U/L (ref 1–40)
BASOPHILS # BLD AUTO: 0.03 10*3/MM3 (ref 0–0.2)
BASOPHILS NFR BLD AUTO: 0.5 % (ref 0–1.5)
BILIRUB SERPL-MCNC: 0.3 MG/DL (ref 0.2–1.2)
BUN BLD-MCNC: 20 MG/DL (ref 8–23)
BUN/CREAT SERPL: 20.6 (ref 7–25)
CALCIUM SPEC-SCNC: 9.1 MG/DL (ref 8.6–10.5)
CHLORIDE SERPL-SCNC: 101 MMOL/L (ref 98–107)
CHOLEST SERPL-MCNC: 140 MG/DL (ref 0–200)
CO2 SERPL-SCNC: 27.6 MMOL/L (ref 22–29)
CREAT BLD-MCNC: 0.97 MG/DL (ref 0.76–1.27)
CREAT UR-MCNC: 105.2 MG/DL
DEPRECATED RDW RBC AUTO: 40.8 FL (ref 37–54)
EOSINOPHIL # BLD AUTO: 0.07 10*3/MM3 (ref 0–0.4)
EOSINOPHIL NFR BLD AUTO: 1.2 % (ref 0.3–6.2)
ERYTHROCYTE [DISTWIDTH] IN BLOOD BY AUTOMATED COUNT: 12.6 % (ref 12.3–15.4)
GFR SERPL CREATININE-BSD FRML MDRD: 77 ML/MIN/1.73
GLOBULIN UR ELPH-MCNC: 3 GM/DL
GLUCOSE BLD-MCNC: 75 MG/DL (ref 65–99)
HBA1C MFR BLD: 8.3 % (ref 4.8–5.6)
HCT VFR BLD AUTO: 37.3 % (ref 37.5–51)
HDLC SERPL-MCNC: 38 MG/DL (ref 40–60)
HGB BLD-MCNC: 13.4 G/DL (ref 13–17.7)
IMM GRANULOCYTES # BLD AUTO: 0.02 10*3/MM3 (ref 0–0.05)
IMM GRANULOCYTES NFR BLD AUTO: 0.3 % (ref 0–0.5)
LDLC SERPL CALC-MCNC: 56 MG/DL (ref 0–100)
LDLC/HDLC SERPL: 1.46 {RATIO}
LYMPHOCYTES # BLD AUTO: 2.21 10*3/MM3 (ref 0.7–3.1)
LYMPHOCYTES NFR BLD AUTO: 36.8 % (ref 19.6–45.3)
MAGNESIUM SERPL-MCNC: 2.2 MG/DL (ref 1.6–2.4)
MCH RBC QN AUTO: 32.8 PG (ref 26.6–33)
MCHC RBC AUTO-ENTMCNC: 35.9 G/DL (ref 31.5–35.7)
MCV RBC AUTO: 91.4 FL (ref 79–97)
MICROALBUMIN/CREAT UR: NORMAL MG/G{CREAT}
MONOCYTES # BLD AUTO: 0.62 10*3/MM3 (ref 0.1–0.9)
MONOCYTES NFR BLD AUTO: 10.3 % (ref 5–12)
NEUTROPHILS # BLD AUTO: 3.06 10*3/MM3 (ref 1.7–7)
NEUTROPHILS NFR BLD AUTO: 50.9 % (ref 42.7–76)
NRBC BLD AUTO-RTO: 0 /100 WBC (ref 0–0.2)
PLATELET # BLD AUTO: 181 10*3/MM3 (ref 140–450)
PMV BLD AUTO: 11.3 FL (ref 6–12)
POTASSIUM BLD-SCNC: 4.4 MMOL/L (ref 3.5–5.2)
PROT SERPL-MCNC: 7.6 G/DL (ref 6–8.5)
RBC # BLD AUTO: 4.08 10*6/MM3 (ref 4.14–5.8)
SODIUM BLD-SCNC: 139 MMOL/L (ref 136–145)
T4 FREE SERPL-MCNC: 0.89 NG/DL (ref 0.93–1.7)
TRIGL SERPL-MCNC: 232 MG/DL (ref 0–150)
TSH SERPL DL<=0.05 MIU/L-ACNC: 0.57 UIU/ML (ref 0.27–4.2)
VIT B12 BLD-MCNC: 795 PG/ML (ref 211–946)
VLDLC SERPL-MCNC: 46.4 MG/DL (ref 5–40)
WBC NRBC COR # BLD: 6.01 10*3/MM3 (ref 3.4–10.8)

## 2019-11-26 PROCEDURE — 82043 UR ALBUMIN QUANTITATIVE: CPT | Performed by: FAMILY MEDICINE

## 2019-11-26 PROCEDURE — 84439 ASSAY OF FREE THYROXINE: CPT | Performed by: FAMILY MEDICINE

## 2019-11-26 PROCEDURE — 83735 ASSAY OF MAGNESIUM: CPT | Performed by: FAMILY MEDICINE

## 2019-11-26 PROCEDURE — 84443 ASSAY THYROID STIM HORMONE: CPT | Performed by: FAMILY MEDICINE

## 2019-11-26 PROCEDURE — 11200 RMVL SKIN TAGS UP TO&INC 15: CPT | Performed by: FAMILY MEDICINE

## 2019-11-26 PROCEDURE — 82570 ASSAY OF URINE CREATININE: CPT | Performed by: FAMILY MEDICINE

## 2019-11-26 PROCEDURE — 83036 HEMOGLOBIN GLYCOSYLATED A1C: CPT | Performed by: FAMILY MEDICINE

## 2019-11-26 PROCEDURE — 85025 COMPLETE CBC W/AUTO DIFF WBC: CPT | Performed by: FAMILY MEDICINE

## 2019-11-26 PROCEDURE — 82607 VITAMIN B-12: CPT | Performed by: FAMILY MEDICINE

## 2019-11-26 PROCEDURE — 99214 OFFICE O/P EST MOD 30 MIN: CPT | Performed by: FAMILY MEDICINE

## 2019-11-26 PROCEDURE — 80061 LIPID PANEL: CPT | Performed by: FAMILY MEDICINE

## 2019-11-26 PROCEDURE — 80053 COMPREHEN METABOLIC PANEL: CPT | Performed by: FAMILY MEDICINE

## 2019-11-26 PROCEDURE — 36415 COLL VENOUS BLD VENIPUNCTURE: CPT | Performed by: FAMILY MEDICINE

## 2019-11-26 RX ORDER — POTASSIUM CHLORIDE 750 MG/1
TABLET, FILM COATED, EXTENDED RELEASE ORAL
Refills: 3 | COMMUNITY
Start: 2019-11-20

## 2019-11-26 RX ORDER — FUROSEMIDE 20 MG/1
TABLET ORAL
Refills: 3 | COMMUNITY
Start: 2019-11-20

## 2019-11-26 RX ORDER — CARBAMAZEPINE 100 MG/1
TABLET, EXTENDED RELEASE ORAL
Qty: 300 TABLET | Refills: 4 | Status: SHIPPED | OUTPATIENT
Start: 2019-11-26 | End: 2020-02-26 | Stop reason: SDUPTHER

## 2019-11-26 NOTE — PROGRESS NOTES
Subjective   Beka Zavaleta is a 68 y.o. male.     Hypertension   This is a chronic problem. The current episode started more than 1 year ago. The problem is unchanged. The problem is controlled. Pertinent negatives include no chest pain, orthopnea, palpitations, peripheral edema, PND or shortness of breath.   Diabetes   He presents for his follow-up diabetic visit. He has type 2 diabetes mellitus. His disease course has been fluctuating. Pertinent negatives for hypoglycemia include no confusion or hunger. Associated symptoms include polyuria. Pertinent negatives for diabetes include no chest pain, no foot paresthesias, no foot ulcerations and no polydipsia. His home blood glucose trend is fluctuating dramatically. His breakfast blood glucose is taken between 7-8 am. His breakfast blood glucose range is generally 180-200 mg/dl. ( hypoglycemia with tighter controll    ) An ACE inhibitor/angiotensin II receptor blocker is being taken. Eye exam is current.   Coronary Artery Disease   Presents for follow-up visit. Pertinent negatives include no chest pain, chest pressure, chest tightness, leg swelling, palpitations, shortness of breath or weight gain. Risk factors include hyperlipidemia.   Heartburn   He reports no chest pain, no dysphagia or no heartburn. This is a chronic problem. The current episode started more than 1 year ago. The problem occurs occasionally.   Hyperlipidemia   This is a chronic problem. The current episode started more than 1 year ago. The problem is controlled. Recent lipid tests were reviewed and are normal. Pertinent negatives include no chest pain, myalgias or shortness of breath.        The following portions of the patient's history were reviewed and updated as appropriate: allergies, current medications, past family history, past medical history, past social history, past surgical history and problem list.    Review of Systems   Constitutional: Negative for weight gain.    Respiratory: Negative for chest tightness and shortness of breath.    Cardiovascular: Negative for chest pain, palpitations, orthopnea, leg swelling and PND.   Gastrointestinal: Negative for dysphagia and heartburn.   Endocrine: Positive for polyuria. Negative for polydipsia.   Musculoskeletal: Negative for myalgias.   Psychiatric/Behavioral: Negative for confusion.       Objective   Physical Exam   Constitutional: He is oriented to person, place, and time. He appears well-developed and well-nourished. No distress.   HENT:   Head: Normocephalic and atraumatic.   Cardiovascular: Normal rate, regular rhythm, normal heart sounds and intact distal pulses. Exam reveals no gallop and no friction rub.   No murmur heard.  Pulmonary/Chest: Effort normal and breath sounds normal. No respiratory distress. He has no wheezes. He has no rales. He exhibits no tenderness.   Abdominal: Soft. Normal appearance. There is no tenderness.   Musculoskeletal: He exhibits no edema.    Beka had a diabetic foot exam performed (callus noted) today.   During the foot exam he had a monofilament test performed (normal).  Vascular Status -  His right foot exhibits no edema. His left foot exhibits no edema.  Neurological: He is alert and oriented to person, place, and time.   Skin: Skin is warm and dry. He is not diaphoretic.   Psychiatric: He has a normal mood and affect. His behavior is normal. Judgment and thought content normal.   Nursing note and vitals reviewed.      Assessment/Plan   Problems Addressed this Visit        Cardiovascular and Mediastinum    Coronary atherosclerosis (Chronic)    Hypercholesterolemia    Relevant Orders    Lipid Panel    Essential hypertension - Primary    Relevant Medications    furosemide (LASIX) 20 MG tablet    Other Relevant Orders    Comprehensive Metabolic Panel       Digestive    GERD (gastroesophageal reflux disease)    Relevant Orders    Vitamin B12    Magnesium       Endocrine    Type II diabetes  "mellitus, uncontrolled (CMS/[facility])    Relevant Orders    Hemoglobin A1c    Microalbumin / Creatinine Urine Ratio - Urine, Clean Catch      Other Visit Diagnoses     Acquired hypothyroidism        Relevant Orders    T4, Free    TSH    Class 1 obesity due to excess calories with serious comorbidity and body mass index (BMI) of 30.0 to 30.9 in adult              Has history of recurrent hypoglycemia with tighter controll      Visit Vitals  /62   Pulse 70   Ht 188 cm (74\")   Wt 108 kg (239 lb)   SpO2 98%   BMI 30.69 kg/m²       Body mass index is 30.69 kg/m².            This document has been electronically signed by Fede Hawkins MD on November 26, 2019 8:40 AM      "

## 2019-11-26 NOTE — PROGRESS NOTES
Procedure   Destruction of Lesion  Date/Time: 11/26/2019 9:21 AM  Performed by: Fede Hawkins MD  Authorized by: Fede Hawkins MD   Preparation: Patient was prepped and draped in the usual sterile fashion.  Local anesthesia used: yes  Anesthesia: local infiltration    Anesthesia:  Local anesthesia used: yes  Local Anesthetic: lidocaine 1% without epinephrine  Anesthetic total: 1 mL    Sedation:  Patient sedated: no    Patient tolerance: Patient tolerated the procedure well with no immediate complications  Comments: Inflamed/irritated skin tag on the right neck at the collar line removed today patient tolerated procedure well less than 1 cc blood loss

## 2019-12-03 ENCOUNTER — TELEPHONE (OUTPATIENT)
Dept: FAMILY MEDICINE CLINIC | Facility: CLINIC | Age: 69
End: 2019-12-03

## 2019-12-04 NOTE — TELEPHONE ENCOUNTER
Dr Hawkins has not reviewed results. Patient will be called when Dr Hawkins releases results to either Myla or I to call.

## 2019-12-06 ENCOUNTER — TELEPHONE (OUTPATIENT)
Dept: FAMILY MEDICINE CLINIC | Facility: CLINIC | Age: 69
End: 2019-12-06

## 2019-12-06 NOTE — TELEPHONE ENCOUNTER
Per Dr. Hawkins, Ms. Holland has been called with recent lab results & recommendations.  Continue current medications and follow-up as planned or sooner if any problems.      ----- Message from Fede Hawkins MD sent at 12/6/2019 11:45 AM CST -----  Free T4 is low but TSH is not elevated.

## 2019-12-09 RX ORDER — AMLODIPINE BESYLATE 10 MG/1
TABLET ORAL
Qty: 90 TABLET | Refills: 3 | Status: SHIPPED | OUTPATIENT
Start: 2019-12-09 | End: 2020-11-30

## 2019-12-19 ENCOUNTER — CLINICAL SUPPORT (OUTPATIENT)
Dept: FAMILY MEDICINE CLINIC | Facility: CLINIC | Age: 69
End: 2019-12-19

## 2019-12-19 DIAGNOSIS — E53.8 VITAMIN B 12 DEFICIENCY: ICD-10-CM

## 2019-12-19 PROCEDURE — 96372 THER/PROPH/DIAG INJ SC/IM: CPT | Performed by: FAMILY MEDICINE

## 2019-12-19 RX ADMIN — CYANOCOBALAMIN 1000 MCG: 1000 INJECTION, SOLUTION INTRAMUSCULAR; SUBCUTANEOUS at 15:43

## 2019-12-27 ENCOUNTER — TELEPHONE (OUTPATIENT)
Dept: FAMILY MEDICINE CLINIC | Facility: CLINIC | Age: 69
End: 2019-12-27

## 2019-12-27 DIAGNOSIS — E11.9 TYPE 2 DIABETES MELLITUS WITHOUT COMPLICATION (HCC): ICD-10-CM

## 2019-12-27 RX ORDER — INSULIN GLARGINE 100 [IU]/ML
190 INJECTION, SOLUTION SUBCUTANEOUS DAILY
Qty: 60 ML | Refills: 3 | Status: SHIPPED | OUTPATIENT
Start: 2019-12-27 | End: 2019-12-30

## 2019-12-28 DIAGNOSIS — E11.9 TYPE 2 DIABETES MELLITUS WITHOUT COMPLICATION (HCC): ICD-10-CM

## 2019-12-30 RX ORDER — INSULIN GLARGINE 100 [IU]/ML
190 INJECTION, SOLUTION SUBCUTANEOUS DAILY
Qty: 60 ML | Refills: 5 | Status: SHIPPED | OUTPATIENT
Start: 2019-12-30 | End: 2020-05-06

## 2020-01-10 ENCOUNTER — TELEPHONE (OUTPATIENT)
Dept: FAMILY MEDICINE CLINIC | Facility: CLINIC | Age: 70
End: 2020-01-10

## 2020-01-10 RX ORDER — LISINOPRIL 40 MG/1
40 TABLET ORAL 2 TIMES DAILY
Qty: 180 TABLET | Refills: 1 | Status: SHIPPED | OUTPATIENT
Start: 2020-01-10 | End: 2020-05-27 | Stop reason: SDUPTHER

## 2020-01-15 ENCOUNTER — CLINICAL SUPPORT (OUTPATIENT)
Dept: FAMILY MEDICINE CLINIC | Facility: CLINIC | Age: 70
End: 2020-01-15

## 2020-01-15 DIAGNOSIS — E53.8 VITAMIN B 12 DEFICIENCY: ICD-10-CM

## 2020-01-15 PROCEDURE — 96372 THER/PROPH/DIAG INJ SC/IM: CPT | Performed by: FAMILY MEDICINE

## 2020-01-15 RX ADMIN — CYANOCOBALAMIN 1000 MCG: 1000 INJECTION, SOLUTION INTRAMUSCULAR; SUBCUTANEOUS at 16:11

## 2020-02-03 RX ORDER — ATENOLOL 100 MG/1
TABLET ORAL
Qty: 90 TABLET | Refills: 0 | Status: SHIPPED | OUTPATIENT
Start: 2020-02-03 | End: 2020-04-27

## 2020-02-12 ENCOUNTER — CLINICAL SUPPORT (OUTPATIENT)
Dept: FAMILY MEDICINE CLINIC | Facility: CLINIC | Age: 70
End: 2020-02-12

## 2020-02-12 DIAGNOSIS — R53.81 MALAISE AND FATIGUE: ICD-10-CM

## 2020-02-12 DIAGNOSIS — R53.83 MALAISE AND FATIGUE: ICD-10-CM

## 2020-02-12 DIAGNOSIS — E53.8 VITAMIN B 12 DEFICIENCY: ICD-10-CM

## 2020-02-12 PROCEDURE — 96372 THER/PROPH/DIAG INJ SC/IM: CPT | Performed by: FAMILY MEDICINE

## 2020-02-12 RX ADMIN — CYANOCOBALAMIN 1000 MCG: 1000 INJECTION, SOLUTION INTRAMUSCULAR; SUBCUTANEOUS at 10:39

## 2020-02-14 RX ORDER — CLOPIDOGREL BISULFATE 75 MG/1
TABLET ORAL
Qty: 90 TABLET | Refills: 1 | Status: SHIPPED | OUTPATIENT
Start: 2020-02-14 | End: 2020-08-27 | Stop reason: SDUPTHER

## 2020-02-26 ENCOUNTER — OFFICE VISIT (OUTPATIENT)
Dept: FAMILY MEDICINE CLINIC | Facility: CLINIC | Age: 70
End: 2020-02-26

## 2020-02-26 VITALS
WEIGHT: 229 LBS | DIASTOLIC BLOOD PRESSURE: 78 MMHG | SYSTOLIC BLOOD PRESSURE: 126 MMHG | HEART RATE: 70 BPM | OXYGEN SATURATION: 98 % | BODY MASS INDEX: 31.02 KG/M2 | HEIGHT: 72 IN

## 2020-02-26 DIAGNOSIS — I25.10 ATHEROSCLEROSIS OF NATIVE CORONARY ARTERY OF NATIVE HEART WITHOUT ANGINA PECTORIS: ICD-10-CM

## 2020-02-26 DIAGNOSIS — I10 ESSENTIAL HYPERTENSION: Primary | ICD-10-CM

## 2020-02-26 DIAGNOSIS — E11.65 UNCONTROLLED TYPE 2 DIABETES MELLITUS WITH HYPERGLYCEMIA (HCC): ICD-10-CM

## 2020-02-26 DIAGNOSIS — E03.9 ACQUIRED HYPOTHYROIDISM: ICD-10-CM

## 2020-02-26 DIAGNOSIS — E66.09 CLASS 1 OBESITY DUE TO EXCESS CALORIES WITH SERIOUS COMORBIDITY AND BODY MASS INDEX (BMI) OF 31.0 TO 31.9 IN ADULT: ICD-10-CM

## 2020-02-26 DIAGNOSIS — K21.9 GASTROESOPHAGEAL REFLUX DISEASE WITHOUT ESOPHAGITIS: ICD-10-CM

## 2020-02-26 DIAGNOSIS — L57.0 ACTINIC KERATOSIS: ICD-10-CM

## 2020-02-26 DIAGNOSIS — E78.00 HYPERCHOLESTEROLEMIA: ICD-10-CM

## 2020-02-26 PROCEDURE — 17003 DESTRUCT PREMALG LES 2-14: CPT | Performed by: FAMILY MEDICINE

## 2020-02-26 PROCEDURE — 99214 OFFICE O/P EST MOD 30 MIN: CPT | Performed by: FAMILY MEDICINE

## 2020-02-26 PROCEDURE — 17000 DESTRUCT PREMALG LESION: CPT | Performed by: FAMILY MEDICINE

## 2020-02-26 RX ORDER — CARBAMAZEPINE 100 MG/1
TABLET, EXTENDED RELEASE ORAL
Qty: 300 TABLET | Refills: 4 | Status: SHIPPED | OUTPATIENT
Start: 2020-02-26 | End: 2020-07-01

## 2020-02-26 NOTE — PROGRESS NOTES
Procedure   Cryotherapy, Skin Lesion  Date/Time: 2/26/2020 9:31 AM  Performed by: Fede Hawkins MD  Authorized by: Fede Hawkins MD   Local anesthesia used: no    Anesthesia:  Local anesthesia used: no    Sedation:  Patient sedated: no    Patient tolerance: Patient tolerated the procedure well with no immediate complications  Comments: For actinic keratosis on the scalp and one inflamed/ulcerated seborrheic keratosis on the left side of the neck frozen today.

## 2020-02-26 NOTE — PATIENT INSTRUCTIONS
Advance Care Planning and Advance Directives     You make decisions on a daily basis - decisions about where you want to live, your career, your home, your life. Perhaps one of the most important decisions you face is your choice for future medical care. Take time to talk with your family and your healthcare team and start planning today.  Advance Care Planning is a process that can help you:  · Understand possible future healthcare decisions in light of your own experiences  · Reflect on those decision in light of your goals and values  · Discuss your decisions with those closest to you and the healthcare professionals that care for you  · Make a plan by creating a document that reflects your wishes    Surrogate Decision Maker  In the event of a medical emergency, which has left you unable to communicate or to make your own decisions, you would need someone to make decisions for you.  It is important to discuss your preferences for medical treatment with this person while you are in good health.     Qualities of a surrogate decision maker:  • Willing to take on this role and responsibility  • Knows what you want for future medical care  • Willing to follow your wishes even if they don't agree with them  • Able to make difficult medical decisions under stressful circumstances    Advance Directives  These are legal documents you can create that will guide your healthcare team and decision maker(s) when needed. These documents can be stored in the electronic medical record.    · Living Will - a legal document to guide your care if you have a terminal condition or a serious illness and are unable to communicate. States vary by statute in document names/types, but most forms may include one or more of the following:        -  Directions regarding life-prolonging treatments        -  Directions regarding artificially provided nutrition/hydration        -  Choosing a healthcare decision maker        -  Direction  regarding organ/tissue donation    · Durable Power of  for Healthcare - this document names an -in-fact to make medical decisions for you, but it may also allow this person to make personal and financial decisions for you. Please seek the advice of an  if you need this type of document.    **Advance Directives are not required and no one may discriminate against you if you do not sign one.    Medical Orders  Many states allow specific forms/orders signed by your physician to record your wishes for medical treatment in your current state of health. This form, signed in personal communication with your physician, addresses resuscitation and other medical interventions that you may or may not want.      For more information or to schedule a time with a [facility] Advance Care Planning Facilitator contact: [facility] or call [phone] and someone will contact you directly.    Calorie Counting for Weight Loss  Calories are units of energy. Your body needs a certain amount of calories from food to keep you going throughout the day. When you eat more calories than your body needs, your body stores the extra calories as fat. When you eat fewer calories than your body needs, your body burns fat to get the energy it needs.  Calorie counting means keeping track of how many calories you eat and drink each day. Calorie counting can be helpful if you need to lose weight. If you make sure to eat fewer calories than your body needs, you should lose weight. Ask your health care provider what a healthy weight is for you.  For calorie counting to work, you will need to eat the right number of calories in a day in order to lose a healthy amount of weight per week. A dietitian can help you determine how many calories you need in a day and will give you suggestions on how to reach your calorie goal.  · A healthy amount of weight to lose per week is usually 1-2 lb (0.5-0.9 kg). This usually means  that your daily calorie intake should be reduced by 500-750 calories.  · Eating 1,200 - 1,500 calories per day can help most women lose weight.  · Eating 1,500 - 1,800 calories per day can help most men lose weight.  What is my plan?  My goal is to have __________ calories per day.  If I have this many calories per day, I should lose around __________ pounds per week.  What do I need to know about calorie counting?  In order to meet your daily calorie goal, you will need to:  · Find out how many calories are in each food you would like to eat. Try to do this before you eat.  · Decide how much of the food you plan to eat.  · Write down what you ate and how many calories it had. Doing this is called keeping a food log.  To successfully lose weight, it is important to balance calorie counting with a healthy lifestyle that includes regular activity. Aim for 150 minutes of moderate exercise (such as walking) or 75 minutes of vigorous exercise (such as running) each week.  Where do I find calorie information?    The number of calories in a food can be found on a Nutrition Facts label. If a food does not have a Nutrition Facts label, try to look up the calories online or ask your dietitian for help.  Remember that calories are listed per serving. If you choose to have more than one serving of a food, you will have to multiply the calories per serving by the amount of servings you plan to eat. For example, the label on a package of bread might say that a serving size is 1 slice and that there are 90 calories in a serving. If you eat 1 slice, you will have eaten 90 calories. If you eat 2 slices, you will have eaten 180 calories.  How do I keep a food log?  Immediately after each meal, record the following information in your food log:  · What you ate. Don't forget to include toppings, sauces, and other extras on the food.  · How much you ate. This can be measured in cups, ounces, or number of items.  · How many calories each  "food and drink had.  · The total number of calories in the meal.  Keep your food log near you, such as in a small notebook in your pocket, or use a mobile daniel or website. Some programs will calculate calories for you and show you how many calories you have left for the day to meet your goal.  What are some calorie counting tips?    · Use your calories on foods and drinks that will fill you up and not leave you hungry:  ? Some examples of foods that fill you up are nuts and nut butters, vegetables, lean proteins, and high-fiber foods like whole grains. High-fiber foods are foods with more than 5 g fiber per serving.  ? Drinks such as sodas, specialty coffee drinks, alcohol, and juices have a lot of calories, yet do not fill you up.  · Eat nutritious foods and avoid empty calories. Empty calories are calories you get from foods or beverages that do not have many vitamins or protein, such as candy, sweets, and soda. It is better to have a nutritious high-calorie food (such as an avocado) than a food with few nutrients (such as a bag of chips).  · Know how many calories are in the foods you eat most often. This will help you calculate calorie counts faster.  · Pay attention to calories in drinks. Low-calorie drinks include water and unsweetened drinks.  · Pay attention to nutrition labels for \"low fat\" or \"fat free\" foods. These foods sometimes have the same amount of calories or more calories than the full fat versions. They also often have added sugar, starch, or salt, to make up for flavor that was removed with the fat.  · Find a way of tracking calories that works for you. Get creative. Try different apps or programs if writing down calories does not work for you.  What are some portion control tips?  · Know how many calories are in a serving. This will help you know how many servings of a certain food you can have.  · Use a measuring cup to measure serving sizes. You could also try weighing out portions on a " kitchen scale. With time, you will be able to estimate serving sizes for some foods.  · Take some time to put servings of different foods on your favorite plates, bowls, and cups so you know what a serving looks like.  · Try not to eat straight from a bag or box. Doing this can lead to overeating. Put the amount you would like to eat in a cup or on a plate to make sure you are eating the right portion.  · Use smaller plates, glasses, and bowls to prevent overeating.  · Try not to multitask (for example, watch TV or use your computer) while eating. If it is time to eat, sit down at a table and enjoy your food. This will help you to know when you are full. It will also help you to be aware of what you are eating and how much you are eating.  What are tips for following this plan?  Reading food labels  · Check the calorie count compared to the serving size. The serving size may be smaller than what you are used to eating.  · Check the source of the calories. Make sure the food you are eating is high in vitamins and protein and low in saturated and trans fats.  Shopping  · Read nutrition labels while you shop. This will help you make healthy decisions before you decide to purchase your food.  · Make a grocery list and stick to it.  Cooking  · Try to cook your favorite foods in a healthier way. For example, try baking instead of frying.  · Use low-fat dairy products.  Meal planning  · Use more fruits and vegetables. Half of your plate should be fruits and vegetables.  · Include lean proteins like poultry and fish.  How do I count calories when eating out?  · Ask for smaller portion sizes.  · Consider sharing an entree and sides instead of getting your own entree.  · If you get your own entree, eat only half. Ask for a box at the beginning of your meal and put the rest of your entree in it so you are not tempted to eat it.  · If calories are listed on the menu, choose the lower calorie options.  · Choose dishes that  "include vegetables, fruits, whole grains, low-fat dairy products, and lean protein.  · Choose items that are boiled, broiled, grilled, or steamed. Stay away from items that are buttered, battered, fried, or served with cream sauce. Items labeled \"crispy\" are usually fried, unless stated otherwise.  · Choose water, low-fat milk, unsweetened iced tea, or other drinks without added sugar. If you want an alcoholic beverage, choose a lower calorie option such as a glass of wine or light beer.  · Ask for dressings, sauces, and syrups on the side. These are usually high in calories, so you should limit the amount you eat.  · If you want a salad, choose a garden salad and ask for grilled meats. Avoid extra toppings like pena, cheese, or fried items. Ask for the dressing on the side, or ask for olive oil and vinegar or lemon to use as dressing.  · Estimate how many servings of a food you are given. For example, a serving of cooked rice is ½ cup or about the size of half a baseball. Knowing serving sizes will help you be aware of how much food you are eating at restaurants. The list below tells you how big or small some common portion sizes are based on everyday objects:  ? 1 oz--4 stacked dice.  ? 3 oz--1 deck of cards.  ? 1 tsp--1 die.  ? 1 Tbsp--½ a ping-pong ball.  ? 2 Tbsp--1 ping-pong ball.  ? ½ cup--½ baseball.  ? 1 cup--1 baseball.  Summary  · Calorie counting means keeping track of how many calories you eat and drink each day. If you eat fewer calories than your body needs, you should lose weight.  · A healthy amount of weight to lose per week is usually 1-2 lb (0.5-0.9 kg). This usually means reducing your daily calorie intake by 500-750 calories.  · The number of calories in a food can be found on a Nutrition Facts label. If a food does not have a Nutrition Facts label, try to look up the calories online or ask your dietitian for help.  · Use your calories on foods and drinks that will fill you up, and not on " foods and drinks that will leave you hungry.  · Use smaller plates, glasses, and bowls to prevent overeating.  This information is not intended to replace advice given to you by your health care provider. Make sure you discuss any questions you have with your health care provider.  Document Released: 12/18/2006 Document Revised: 09/06/2019 Document Reviewed: 11/17/2017  [employer] Patient Education © 2020 [employer]

## 2020-02-26 NOTE — PROGRESS NOTES
Subjective   Beka Zavaleta is a 69 y.o. male.     Hypertension   This is a chronic problem. The current episode started more than 1 year ago. The problem is unchanged. The problem is controlled. Pertinent negatives include no chest pain, orthopnea, palpitations, peripheral edema, PND or shortness of breath.   Diabetes   He presents for his follow-up diabetic visit. He has type 2 diabetes mellitus. His disease course has been fluctuating. Pertinent negatives for hypoglycemia include no confusion or hunger. Associated symptoms include polyuria. Pertinent negatives for diabetes include no chest pain, no foot paresthesias, no foot ulcerations and no polydipsia. His home blood glucose trend is fluctuating dramatically. His breakfast blood glucose is taken between 7-8 am. His breakfast blood glucose range is generally >200 mg/dl. ( hypoglycemia with tighter controll    ) An ACE inhibitor/angiotensin II receptor blocker is being taken. Eye exam is current.   Coronary Artery Disease   Presents for follow-up visit. Pertinent negatives include no chest pain, chest pressure, chest tightness, leg swelling, palpitations, shortness of breath or weight gain. Risk factors include hyperlipidemia.   Heartburn   He complains of heartburn. He reports no chest pain or no dysphagia. This is a chronic problem. The current episode started more than 1 year ago. The problem occurs occasionally.   Hyperlipidemia   This is a chronic problem. The current episode started more than 1 year ago. The problem is controlled. Recent lipid tests were reviewed and are normal. Pertinent negatives include no chest pain, myalgias or shortness of breath.        The following portions of the patient's history were reviewed and updated as appropriate: allergies, current medications, past family history, past medical history, past social history, past surgical history and problem list.    Review of Systems   Constitutional: Negative for weight gain.      Respiratory: Negative for chest tightness and shortness of breath.    Cardiovascular: Negative for chest pain, palpitations, orthopnea, leg swelling and PND.   Gastrointestinal: Positive for heartburn. Negative for dysphagia.   Endocrine: Positive for polyuria. Negative for polydipsia.   Musculoskeletal: Negative for myalgias.   Psychiatric/Behavioral: Negative for confusion.       Objective   Physical Exam   Constitutional: He is oriented to person, place, and time. He appears well-developed and well-nourished. No distress.   HENT:   Head: Normocephalic and atraumatic.   Cardiovascular: Normal rate, regular rhythm, normal heart sounds and intact distal pulses. Exam reveals no gallop and no friction rub.   No murmur heard.  Pulmonary/Chest: Effort normal and breath sounds normal. No respiratory distress. He has no wheezes. He has no rales. He exhibits no tenderness.   Abdominal: Soft. Normal appearance. There is no tenderness.   Musculoskeletal: He exhibits no edema.    Beka had a diabetic foot exam performed (callus noted) today.   During the foot exam he had a monofilament test performed (normal).  Vascular Status -  His right foot exhibits no edema. His left foot exhibits no edema.  Neurological: He is alert and oriented to person, place, and time.   Skin: Skin is warm and dry. He is not diaphoretic.   Psychiatric: He has a normal mood and affect. His behavior is normal. Judgment and thought content normal.   Nursing note and vitals reviewed.      Assessment/Plan   Problems Addressed this Visit        Cardiovascular and Mediastinum    Coronary atherosclerosis (Chronic)    Hypercholesterolemia    Essential hypertension - Primary       Digestive    GERD (gastroesophageal reflux disease)       Endocrine    Type II diabetes mellitus, uncontrolled (CMS/[facility])    Relevant Medications    Empagliflozin 10 MG tablet      Other Visit Diagnoses     Actinic keratosis        Relevant Orders    Cryotherapy, Skin Lesion     "Acquired hypothyroidism        Class 1 obesity due to excess calories with serious comorbidity and body mass index (BMI) of 31.0 to 31.9 in adult              Has history of recurrent hypoglycemia with tighter controll      Visit Vitals  /78   Pulse 70   Ht 182.9 cm (72\")   Wt 104 kg (229 lb)   SpO2 98%   BMI 31.06 kg/m²       Body mass index is 31.06 kg/m².            This document has been electronically signed by Fede Hawkins MD on February 26, 2020 9:34 AM      "

## 2020-03-09 ENCOUNTER — CLINICAL SUPPORT (OUTPATIENT)
Dept: FAMILY MEDICINE CLINIC | Facility: CLINIC | Age: 70
End: 2020-03-09

## 2020-03-09 DIAGNOSIS — E53.8 VITAMIN B 12 DEFICIENCY: ICD-10-CM

## 2020-03-09 PROCEDURE — 96372 THER/PROPH/DIAG INJ SC/IM: CPT | Performed by: FAMILY MEDICINE

## 2020-03-09 RX ADMIN — CYANOCOBALAMIN 1000 MCG: 1000 INJECTION, SOLUTION INTRAMUSCULAR; SUBCUTANEOUS at 11:15

## 2020-03-23 ENCOUNTER — TELEPHONE (OUTPATIENT)
Dept: FAMILY MEDICINE CLINIC | Facility: CLINIC | Age: 70
End: 2020-03-23

## 2020-04-06 ENCOUNTER — TELEPHONE (OUTPATIENT)
Dept: FAMILY MEDICINE CLINIC | Facility: CLINIC | Age: 70
End: 2020-04-06

## 2020-04-06 RX ORDER — OMEPRAZOLE 40 MG/1
40 CAPSULE, DELAYED RELEASE ORAL DAILY
Qty: 30 CAPSULE | Refills: 5 | Status: SHIPPED | OUTPATIENT
Start: 2020-04-06 | End: 2020-08-27 | Stop reason: SDUPTHER

## 2020-04-09 ENCOUNTER — TELEPHONE (OUTPATIENT)
Dept: FAMILY MEDICINE CLINIC | Facility: CLINIC | Age: 70
End: 2020-04-09

## 2020-04-09 ENCOUNTER — OFFICE VISIT (OUTPATIENT)
Dept: FAMILY MEDICINE CLINIC | Facility: CLINIC | Age: 70
End: 2020-04-09

## 2020-04-09 VITALS — WEIGHT: 215 LBS | BODY MASS INDEX: 29.12 KG/M2 | HEIGHT: 72 IN

## 2020-04-09 DIAGNOSIS — R19.7 DIARRHEA, UNSPECIFIED TYPE: Primary | ICD-10-CM

## 2020-04-09 PROCEDURE — 99212 OFFICE O/P EST SF 10 MIN: CPT | Performed by: FAMILY MEDICINE

## 2020-04-09 NOTE — TELEPHONE ENCOUNTER
PT WIFE CALLED AND NEEDS TO TALK TO YOU. SHE STATES THAT HE WAS RECENTLY PUT ON A NEW MED FOR HIS SUGARS. SHE STATES SINCE THEN HE HAS STARTED TO HAVE DIARRHEA AND NEEDS TO DISCUSS. CALL HER [phone]

## 2020-04-09 NOTE — PROGRESS NOTES
Subjective   Beka Zavaleta is a 69 y.o. male.     Diarrhea    This is a new problem. The current episode started 1 to 4 weeks ago. The problem occurs 5 to 10 times per day. The problem has been waxing and waning. The stool consistency is described as watery. Pertinent negatives include no abdominal pain, coughing, fever or vomiting. Nothing aggravates the symptoms. Risk factors: started Jardiance. He has tried nothing for the symptoms. The treatment provided no relief.        The following portions of the patient's history were reviewed and updated as appropriate: allergies, current medications, past family history, past medical history, past social history, past surgical history and problem list.    Review of Systems   Constitutional: Negative for fever.   Respiratory: Negative for cough.    Gastrointestinal: Positive for diarrhea. Negative for abdominal pain and vomiting.       Objective   Physical Exam   Constitutional: He is oriented to person, place, and time. No distress.   Neurological: He is alert and oriented to person, place, and time.   Nursing note reviewed.      Assessment/Plan   Problems Addressed this Visit     None      Visit Diagnoses     Diarrhea, unspecified type    -  Primary          You have chosen to receive care through a telephone visit today. Do you consent to use a telephone visit for your medical care today? Yes  This visit has been rescheduled as a phone visit to comply with patient safety concerns in accordance with CDC recommendations. Total time of discussion was 13 minutes.         He has had diarrhea about every other day since starting Jardiance.  This is not a common side effect with it.  He has no other symptoms.  No abdominal pain fever or cough.  At this point will stop Jardiance for couple weeks and see if this resolves.  If persist, he is to call back

## 2020-04-09 NOTE — TELEPHONE ENCOUNTER
Spoke with patient, scheduled him for appointment with Dr webb this afternoon at 1:30pm.  Confirmed with patient.

## 2020-04-27 RX ORDER — SPIRONOLACTONE 25 MG/1
TABLET ORAL
Qty: 90 TABLET | Refills: 1 | Status: SHIPPED | OUTPATIENT
Start: 2020-04-27 | End: 2020-08-27 | Stop reason: SDUPTHER

## 2020-04-27 RX ORDER — ATENOLOL 100 MG/1
TABLET ORAL
Qty: 90 TABLET | Refills: 1 | Status: SHIPPED | OUTPATIENT
Start: 2020-04-27 | End: 2020-08-27 | Stop reason: SDUPTHER

## 2020-05-06 DIAGNOSIS — E11.9 TYPE 2 DIABETES MELLITUS WITHOUT COMPLICATION (HCC): ICD-10-CM

## 2020-05-06 RX ORDER — INSULIN GLARGINE 100 [IU]/ML
INJECTION, SOLUTION SUBCUTANEOUS
Qty: 60 ML | Refills: 5 | Status: SHIPPED | OUTPATIENT
Start: 2020-05-06 | End: 2020-08-27 | Stop reason: SDUPTHER

## 2020-05-12 RX ORDER — SITAGLIPTIN 100 MG/1
TABLET, FILM COATED ORAL
Qty: 30 TABLET | Refills: 5 | Status: SHIPPED | OUTPATIENT
Start: 2020-05-12 | End: 2020-11-16

## 2020-05-27 ENCOUNTER — OFFICE VISIT (OUTPATIENT)
Dept: FAMILY MEDICINE CLINIC | Facility: CLINIC | Age: 70
End: 2020-05-27

## 2020-05-27 DIAGNOSIS — E03.9 ACQUIRED HYPOTHYROIDISM: ICD-10-CM

## 2020-05-27 DIAGNOSIS — E11.9 TYPE 2 DIABETES MELLITUS WITHOUT COMPLICATION, WITH LONG-TERM CURRENT USE OF INSULIN (HCC): ICD-10-CM

## 2020-05-27 DIAGNOSIS — E78.00 HYPERCHOLESTEROLEMIA: ICD-10-CM

## 2020-05-27 DIAGNOSIS — K21.9 GASTROESOPHAGEAL REFLUX DISEASE WITHOUT ESOPHAGITIS: ICD-10-CM

## 2020-05-27 DIAGNOSIS — I10 ESSENTIAL HYPERTENSION: Primary | ICD-10-CM

## 2020-05-27 DIAGNOSIS — Z79.4 TYPE 2 DIABETES MELLITUS WITHOUT COMPLICATION, WITH LONG-TERM CURRENT USE OF INSULIN (HCC): ICD-10-CM

## 2020-05-27 PROCEDURE — 99214 OFFICE O/P EST MOD 30 MIN: CPT | Performed by: FAMILY MEDICINE

## 2020-05-27 RX ORDER — LISINOPRIL 40 MG/1
40 TABLET ORAL 2 TIMES DAILY
Qty: 180 TABLET | Refills: 1 | Status: SHIPPED | OUTPATIENT
Start: 2020-05-27 | End: 2020-11-16 | Stop reason: SDUPTHER

## 2020-06-29 RX ORDER — MONTELUKAST SODIUM 4 MG/1
TABLET, CHEWABLE ORAL
Qty: 360 TABLET | Refills: 0 | Status: SHIPPED | OUTPATIENT
Start: 2020-06-29 | End: 2020-08-27 | Stop reason: SDUPTHER

## 2020-07-01 RX ORDER — CARBAMAZEPINE 100 MG/1
TABLET, EXTENDED RELEASE ORAL
Qty: 300 TABLET | Refills: 4 | Status: SHIPPED | OUTPATIENT
Start: 2020-07-01 | End: 2020-12-23

## 2020-08-27 ENCOUNTER — OFFICE VISIT (OUTPATIENT)
Dept: FAMILY MEDICINE CLINIC | Facility: CLINIC | Age: 70
End: 2020-08-27

## 2020-08-27 DIAGNOSIS — I10 ESSENTIAL HYPERTENSION: ICD-10-CM

## 2020-08-27 DIAGNOSIS — Z00.00 MEDICARE ANNUAL WELLNESS VISIT, SUBSEQUENT: ICD-10-CM

## 2020-08-27 DIAGNOSIS — E78.00 HYPERCHOLESTEROLEMIA: ICD-10-CM

## 2020-08-27 DIAGNOSIS — K21.9 GASTROESOPHAGEAL REFLUX DISEASE WITHOUT ESOPHAGITIS: ICD-10-CM

## 2020-08-27 DIAGNOSIS — E11.65 TYPE 2 DIABETES MELLITUS WITH HYPERGLYCEMIA, WITH LONG-TERM CURRENT USE OF INSULIN (HCC): Primary | ICD-10-CM

## 2020-08-27 DIAGNOSIS — E03.9 ACQUIRED HYPOTHYROIDISM: ICD-10-CM

## 2020-08-27 DIAGNOSIS — Z79.4 TYPE 2 DIABETES MELLITUS WITH HYPERGLYCEMIA, WITH LONG-TERM CURRENT USE OF INSULIN (HCC): Primary | ICD-10-CM

## 2020-08-27 PROCEDURE — 99443 PR PHYS/QHP TELEPHONE EVALUATION 21-30 MIN: CPT | Performed by: FAMILY MEDICINE

## 2020-08-27 PROCEDURE — G0439 PPPS, SUBSEQ VISIT: HCPCS | Performed by: FAMILY MEDICINE

## 2020-08-27 RX ORDER — DOXAZOSIN MESYLATE 4 MG/1
4 TABLET ORAL NIGHTLY
Qty: 90 TABLET | Refills: 3 | Status: SHIPPED | OUTPATIENT
Start: 2020-08-27 | End: 2021-11-08 | Stop reason: SDUPTHER

## 2020-08-27 RX ORDER — ATORVASTATIN CALCIUM 80 MG/1
80 TABLET, FILM COATED ORAL DAILY
Qty: 90 TABLET | Refills: 3 | Status: SHIPPED | OUTPATIENT
Start: 2020-08-27 | End: 2021-11-05 | Stop reason: SDUPTHER

## 2020-08-27 RX ORDER — SPIRONOLACTONE 25 MG/1
25 TABLET ORAL DAILY
Qty: 90 TABLET | Refills: 3 | Status: SHIPPED | OUTPATIENT
Start: 2020-08-27 | End: 2021-03-30

## 2020-08-27 RX ORDER — CLOPIDOGREL BISULFATE 75 MG/1
75 TABLET ORAL DAILY
Qty: 90 TABLET | Refills: 3 | Status: SHIPPED | OUTPATIENT
Start: 2020-08-27 | End: 2021-11-05 | Stop reason: SDUPTHER

## 2020-08-27 RX ORDER — OMEPRAZOLE 40 MG/1
40 CAPSULE, DELAYED RELEASE ORAL DAILY
Qty: 90 CAPSULE | Refills: 3 | Status: SHIPPED | OUTPATIENT
Start: 2020-08-27 | End: 2020-11-16 | Stop reason: SDUPTHER

## 2020-08-27 RX ORDER — MONTELUKAST SODIUM 4 MG/1
2 TABLET, CHEWABLE ORAL 2 TIMES DAILY
Qty: 360 TABLET | Refills: 3 | Status: SHIPPED | OUTPATIENT
Start: 2020-08-27 | End: 2021-09-07 | Stop reason: SDUPTHER

## 2020-08-27 RX ORDER — ATENOLOL 100 MG/1
100 TABLET ORAL DAILY
Qty: 90 TABLET | Refills: 3 | Status: SHIPPED | OUTPATIENT
Start: 2020-08-27 | End: 2020-11-04

## 2020-08-27 RX ORDER — INSULIN GLARGINE 100 [IU]/ML
200 INJECTION, SOLUTION SUBCUTANEOUS DAILY
Qty: 60 ML | Refills: 5 | Status: SHIPPED | OUTPATIENT
Start: 2020-08-27 | End: 2020-10-15

## 2020-08-27 NOTE — PATIENT INSTRUCTIONS
Advance Care Planning and Advance Directives     You make decisions on a daily basis - decisions about where you want to live, your career, your home, your life. Perhaps one of the most important decisions you face is your choice for future medical care. Take time to talk with your family and your healthcare team and start planning today.  Advance Care Planning is a process that can help you:  · Understand possible future healthcare decisions in light of your own experiences  · Reflect on those decision in light of your goals and values  · Discuss your decisions with those closest to you and the healthcare professionals that care for you  · Make a plan by creating a document that reflects your wishes    Surrogate Decision Maker  In the event of a medical emergency, which has left you unable to communicate or to make your own decisions, you would need someone to make decisions for you.  It is important to discuss your preferences for medical treatment with this person while you are in good health.     Qualities of a surrogate decision maker:  • Willing to take on this role and responsibility  • Knows what you want for future medical care  • Willing to follow your wishes even if they don't agree with them  • Able to make difficult medical decisions under stressful circumstances    Advance Directives  These are legal documents you can create that will guide your healthcare team and decision maker(s) when needed. These documents can be stored in the electronic medical record.    · Living Will - a legal document to guide your care if you have a terminal condition or a serious illness and are unable to communicate. States vary by statute in document names/types, but most forms may include one or more of the following:        -  Directions regarding life-prolonging treatments        -  Directions regarding artificially provided nutrition/hydration        -  Choosing a healthcare decision maker        -  Direction  regarding organ/tissue donation    · Durable Power of  for Healthcare - this document names an -in-fact to make medical decisions for you, but it may also allow this person to make personal and financial decisions for you. Please seek the advice of an  if you need this type of document.    **Advance Directives are not required and no one may discriminate against you if you do not sign one.    Medical Orders  Many states allow specific forms/orders signed by your physician to record your wishes for medical treatment in your current state of health. This form, signed in personal communication with your physician, addresses resuscitation and other medical interventions that you may or may not want.      For more information or to schedule a time with a [facility] Advance Care Planning Facilitator contact: [email or web address] or call [phone] and someone will contact you directly.

## 2020-08-27 NOTE — PROGRESS NOTES
Subjective   Beka Zavaleta is a 69 y.o. male.     Hypertension   This is a chronic problem. The current episode started more than 1 year ago. The problem is unchanged. The problem is controlled. Pertinent negatives include no chest pain, orthopnea, palpitations, peripheral edema, PND or shortness of breath.   Diabetes   He presents for his follow-up diabetic visit. He has type 2 diabetes mellitus. His disease course has been fluctuating. Pertinent negatives for hypoglycemia include no hunger. Pertinent negatives for diabetes include no chest pain and no foot paresthesias. His home blood glucose trend is fluctuating minimally. His breakfast blood glucose range is generally 130-140 mg/dl. (221-140    ) An ACE inhibitor/angiotensin II receptor blocker is being taken.   Coronary Artery Disease   Presents for follow-up visit. Pertinent negatives include no chest pain, chest pressure, chest tightness, leg swelling, palpitations, shortness of breath or weight gain. Risk factors include hyperlipidemia.   Heartburn   He complains of heartburn. He reports no chest pain or no dysphagia. This is a chronic problem. The current episode started more than 1 year ago. The problem occurs occasionally.   Hyperlipidemia   This is a chronic problem. The current episode started more than 1 year ago. The problem is controlled. Recent lipid tests were reviewed and are normal. Associated symptoms include myalgias. Pertinent negatives include no chest pain or shortness of breath.        The following portions of the patient's history were reviewed and updated as appropriate: allergies, current medications, past family history, past medical history, past social history, past surgical history and problem list.    Review of Systems   Constitutional: Negative for weight gain.   Respiratory: Negative for chest tightness and shortness of breath.    Cardiovascular: Negative for chest pain, palpitations, orthopnea, leg swelling and PND.      Gastrointestinal: Positive for heartburn. Negative for dysphagia.   Musculoskeletal: Positive for myalgias.       Objective   Physical Exam   Constitutional: He is oriented to person, place, and time. No distress.   Neurological: He is alert and oriented to person, place, and time.   Psychiatric: He has a normal mood and affect. His behavior is normal. Judgment and thought content normal.       Assessment/Plan   Problems Addressed this Visit        Cardiovascular and Mediastinum    Hypercholesterolemia    Relevant Medications    colestipol (COLESTID) 1 g tablet    atorvastatin (LIPITOR) 80 MG tablet    Essential hypertension - Primary    Relevant Medications    spironolactone (ALDACTONE) 25 MG tablet    atenolol (TENORMIN) 100 MG tablet    doxazosin (CARDURA) 4 MG tablet       Digestive    GERD (gastroesophageal reflux disease)    Relevant Medications    omeprazole (priLOSEC) 40 MG capsule       Endocrine    Type 2 diabetes mellitus (CMS/[facility])    Relevant Medications    insulin glargine (Lantus) 100 UNIT/ML injection       Other    Medicare annual wellness visit, subsequent      Other Visit Diagnoses     Acquired hypothyroidism        Relevant Medications    atenolol (TENORMIN) 100 MG tablet      You have chosen to receive care through a telephone visit. Do you consent to use a telephone visit for your medical care today? Yes  This visit has been rescheduled as a phone visit to comply with patient safety concerns in accordance with CDC recommendations. Total time of discussion was 21   minutes.    Diabete.  Increase Lantus from 190 units a day up to 200 units a day          There were no vitals taken for this visit.    There is no height or weight on file to calculate BMI.            This document has been electronically signed by Fede Hawkins MD on August 27, 2020 09:22

## 2020-08-27 NOTE — PROGRESS NOTES
The ABCs of the Annual Wellness Visit  Subsequent Medicare Wellness Visit    No chief complaint on file.      Subjective   History of Present Illness:  Beka Zavaleta is a 69 y.o. male who presents for a Subsequent Medicare Wellness Visit.    HEALTH RISK ASSESSMENT    Recent Hospitalizations:  No hospitalization(s) within the last year.    Current Medical Providers:  Patient Care Team:  Fede Hawkins MD as PCP - General (Family Medicine)  Fede Hawkins MD as PCP - Claims Attributed  Riley Osorio MD as Surgeon (General Surgery)  Mario Smart MD as Surgeon (Orthopedic Surgery)  Tam Daley MD as Consulting Physician (Ophthalmology)  Ej Hatch MD as Consulting Physician (Interventional Cardiology)    Smoking Status:  Social History     Tobacco Use   Smoking Status Former Smoker   • Types: Cigarettes   • Last attempt to quit: 1988   • Years since quittin.6   Smokeless Tobacco Former User   • Types: Chew       Alcohol Consumption:  Social History     Substance and Sexual Activity   Alcohol Use No       Depression Screen:   PHQ-2/PHQ-9 Depression Screening 2019   Little interest or pleasure in doing things 0   Feeling down, depressed, or hopeless 0   Trouble falling or staying asleep, or sleeping too much 3   Feeling tired or having little energy 3   Poor appetite or overeating 2   Feeling bad about yourself - or that you are a failure or have let yourself or your family down 0   Trouble concentrating on things, such as reading the newspaper or watching television 1   Moving or speaking so slowly that other people could have noticed. Or the opposite - being so fidgety or restless that you have been moving around a lot more than usual 0   Thoughts that you would be better off dead, or of hurting yourself in some way 0   Total Score 9   If you checked off any problems, how difficult have these problems made it for you to do your work, take care of things at home, or  get along with other people? Not difficult at all       Fall Risk Screen:  SUSSY Fall Risk Assessment has not been completed.    Health Habits and Functional and Cognitive Screening:  Functional & Cognitive Status 5/28/2019   Do you have difficulty preparing food and eating? No   Do you have difficulty bathing yourself, getting dressed or grooming yourself? No   Do you have difficulty using the toilet? No   Do you have difficulty moving around from place to place? No   Do you have trouble with steps or getting out of a bed or a chair? No   Current Diet Diabetic Diet   Dental Exam Up to date   Eye Exam Up to date   Exercise (times per week) 0 times per week   Current Exercise Activities Include None   Do you need help using the phone?  No   Are you deaf or do you have serious difficulty hearing?  Yes   Do you need help with transportation? No   Do you need help shopping? No   Do you need help preparing meals?  No   Do you need help with housework?  No   Do you need help with laundry? No   Do you need help taking your medications? No   Do you need help managing money? No   Do you ever drive or ride in a car without wearing a seat belt? No   Have you felt unusual stress, anger or loneliness in the last month? No   Who do you live with? Spouse   If you need help, do you have trouble finding someone available to you? No   Have you been bothered in the last four weeks by sexual problems? Yes   Do you have difficulty concentrating, remembering or making decisions? No         Does the patient have evidence of cognitive impairment? No    Asprin use counseling:Taking ASA appropriately as indicated    Age-appropriate Screening Schedule:  Refer to the list below for future screening recommendations based on patient's age, sex and/or medical conditions. Orders for these recommended tests are listed in the plan section. The patient has been provided with a written plan.    Health Maintenance   Topic Date Due   • ZOSTER VACCINE  "(2 of 3) 03/23/2009   • TDAP/TD VACCINES (2 - Td) 04/28/2020   • HEMOGLOBIN A1C  05/26/2020   • INFLUENZA VACCINE  08/01/2020   • DIABETIC EYE EXAM  09/06/2020   • LIPID PANEL  11/26/2020   • URINE MICROALBUMIN  11/26/2020   • DIABETIC FOOT EXAM  02/26/2021   • COLONOSCOPY  04/19/2024          The following portions of the patient's history were reviewed and updated as appropriate: allergies, current medications, past family history, past medical history, past social history, past surgical history and problem list.    Outpatient Medications Prior to Visit   Medication Sig Dispense Refill   • amLODIPine (NORVASC) 10 MG tablet TAKE 1 TABLET BY MOUTH ONCE DAILY 90 tablet 3   • aspirin 81 MG tablet Take 81 mg by mouth daily.     • atenolol (TENORMIN) 100 MG tablet Take 1 tablet by mouth once daily 90 tablet 1   • atorvastatin (LIPITOR) 80 MG tablet TAKE 1 TABLET BY MOUTH ONCE DAILY 90 tablet 3   • carBAMazepine XR (TEGretol  XR) 100 MG 12 hr tablet TAKE 4 TABLETS BY MOUTH EVERY MORNING, 3 AT LUNCH AND TAKE 3 TABLETS AT BEDTIME 300 tablet 4   • clopidogrel (PLAVIX) 75 MG tablet TAKE 1 TABLET BY MOUTH ONCE DAILY 90 tablet 1   • colestipol (COLESTID) 1 g tablet Take 2 tablets by mouth twice daily 360 tablet 0   • cyanocobalamin 1000 MCG/ML injection INJECT 1 ML AS DIRECTED EVERY 28 DAYS (INTRAMUSCULAR) 1 mL 12   • doxazosin (CARDURA) 4 MG tablet Take 1 tablet by mouth Every Night. 90 tablet 3   • furosemide (LASIX) 20 MG tablet TAKE ONE TABLET BY MOUTH DAILY FOR 3 DAYS THEN TAKE AS NEEDED FOR INCREASED SHORTNESS OF AIR EDEMA OR 2 3 LB WEIGHT GAIN IN 24 HOURS  3   • glucose blood (ACCU-CHEK ENDER) test strip Test sugars up to 4 times daily as directed by Physician.     • Insulin Syringe-Needle U-100 30G X 7/16\" 1 ML misc daily.     • isosorbide mononitrate (IMDUR) 30 MG 24 hr tablet Take 1 tablet by mouth Daily. Take 1/2 pill once a day (Patient taking differently: Take 30 mg by mouth Daily.) 30 tablet 5   • JANUVIA 100 MG " tablet Take 1 tablet by mouth once daily 30 tablet 5   • LANTUS 100 UNIT/ML injection INJECT 190 UNITS SUBCUTANEOUSLY ONCE DAILY AS DIRECTED 60 mL 5   • lisinopril (PRINIVIL,ZESTRIL) 40 MG tablet Take 1 tablet by mouth 2 (Two) Times a Day. 180 tablet 1   • magnesium oxide (MAGOX) 400 (241.3 MG) MG tablet tablet Take 400 mg by mouth Daily.     • metFORMIN (GLUCOPHAGE) 500 MG tablet Take 1 tablet by mouth 2 (Two) Times a Day With Meals. 180 tablet 3   • Multiple Vitamins-Minerals (ICAPS PO) Take  by mouth daily.     • nitroglycerin (NITROSTAT) 0.4 MG SL tablet Place 0.4 mg under the tongue as needed for chest pain. repeat X 1 in 5 min. if not relieved and then got to ER or call an ambulance     • omeprazole (priLOSEC) 40 MG capsule Take 1 capsule by mouth Daily. 30 capsule 5   • potassium chloride (K-DUR) 10 MEQ CR tablet TAKE ONE TABLET BY MOUTH DAILY FOR 3 DAYS AND THEN AS NEEDED WITH LASIX  3   • ranolazine (RANEXA) 500 MG 12 hr tablet Take 1 tablet by mouth Every 12 (Twelve) Hours.     • RELION ULTRA THIN PLUS LANCETS misc test once daily     • spironolactone (ALDACTONE) 25 MG tablet Take 1 tablet by mouth once daily 90 tablet 1     Facility-Administered Medications Prior to Visit   Medication Dose Route Frequency Provider Last Rate Last Dose   • cyanocobalamin injection 1,000 mcg  1,000 mcg Intramuscular Q28 Days Sundeep Castillo MD   1,000 mcg at 03/09/20 1115       Patient Active Problem List   Diagnosis   • Type II diabetes mellitus, uncontrolled (CMS/HCC)   • Obstructive sleep apnea syndrome   • Impotence   • Hypercholesterolemia   • History of trigeminal neuralgia   • History of colon polyps 03/23/2018   • GERD (gastroesophageal reflux disease)   • Essential hypertension   • Coronary atherosclerosis   • Vitamin B 12 deficiency   • 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, mild stage   • Hepatic steatosis   • Nuclear cataract   • Cortical age-related cataract   • Palpitation   • Cellulitis of right  hand   • Cellulitis of finger of right hand   • Vitamin deficiency   • Type 2 diabetes mellitus (CMS/HCC)   • Plantar fasciitis   • Malaise and fatigue   • Hyperkalemia   • History of Holter monitoring   • History of echocardiogram   • Goiter   • Medicare annual wellness visit, subsequent   • Diverticular disease of colon   • Chronic left shoulder pain   • Type 2 diabetes mellitus without complication, without long-term current use of insulin (CMS/[facility])   • Rotator cuff syndrome   • Impingement syndrome of left shoulder   • Encounter for colonoscopy due to history of adenomatous colonic polyps   • Vitamin B 12 deficiency       Advanced Care Planning:  ACP discussion was held with the patient during this visit. Patient does not have an advance directive, information provided.    Review of Systems    Compared to one year ago, the patient feels his physical health is the same.  Compared to one year ago, the patient feels his mental health is worse.    Reviewed chart for potential of high risk medication in the elderly: yes  Reviewed chart for potential of harmful drug interactions in the elderly:yes    Objective       There were no vitals filed for this visit.    There is no height or weight on file to calculate BMI.  Discussed the patient's BMI with him. The BMI is above average; BMI management plan is completed.    Physical Exam          Assessment/Plan   Medicare Risks and Personalized Health Plan  CMS Preventative Services Quick Reference  Advance Directive Discussion    The above risks/problems have been discussed with the patient.  Pertinent information has been shared with the patient in the After Visit Summary.  Follow up plans and orders are seen below in the Assessment/Plan Section.    There are no diagnoses linked to this encounter.  Follow Up:  No follow-ups on file.     An After Visit Summary and PPPS were given to the patient.

## 2020-10-07 ENCOUNTER — HOSPITAL ENCOUNTER (INPATIENT)
Age: 70
LOS: 2 days | Discharge: HOME | DRG: 392 | End: 2020-10-09
Attending: PEDIATRICS | Admitting: STUDENT IN AN ORGANIZED HEALTH CARE EDUCATION/TRAINING PROGRAM
Payer: MEDICARE

## 2020-10-07 ENCOUNTER — APPOINTMENT (OUTPATIENT)
Dept: ULTRASOUND IMAGING | Age: 70
DRG: 392 | End: 2020-10-07
Payer: MEDICARE

## 2020-10-07 ENCOUNTER — APPOINTMENT (OUTPATIENT)
Dept: CT IMAGING | Age: 70
DRG: 392 | End: 2020-10-07
Payer: MEDICARE

## 2020-10-07 PROBLEM — N17.9 ACUTE KIDNEY INJURY (HCC): Status: ACTIVE | Noted: 2020-10-07

## 2020-10-07 LAB
ALBUMIN SERPL-MCNC: 4.5 G/DL (ref 3.5–5.2)
ALP BLD-CCNC: 91 U/L (ref 40–130)
ALT SERPL-CCNC: 19 U/L (ref 5–41)
ANION GAP SERPL CALCULATED.3IONS-SCNC: 13 MMOL/L (ref 7–19)
AST SERPL-CCNC: 14 U/L (ref 5–40)
BASOPHILS ABSOLUTE: 0 K/UL (ref 0–0.2)
BASOPHILS RELATIVE PERCENT: 0.2 % (ref 0–1)
BILIRUB SERPL-MCNC: 0.3 MG/DL (ref 0.2–1.2)
BUN BLDV-MCNC: 56 MG/DL (ref 8–23)
CALCIUM SERPL-MCNC: 10 MG/DL (ref 8.8–10.2)
CHLORIDE BLD-SCNC: 103 MMOL/L (ref 98–111)
CO2: 22 MMOL/L (ref 22–29)
CREAT SERPL-MCNC: 2.7 MG/DL (ref 0.5–1.2)
EOSINOPHILS ABSOLUTE: 0.1 K/UL (ref 0–0.6)
EOSINOPHILS RELATIVE PERCENT: 1.2 % (ref 0–5)
GFR AFRICAN AMERICAN: 28
GFR NON-AFRICAN AMERICAN: 23
GLUCOSE BLD-MCNC: 126 MG/DL (ref 70–99)
GLUCOSE BLD-MCNC: 140 MG/DL (ref 70–99)
GLUCOSE BLD-MCNC: 148 MG/DL (ref 70–99)
GLUCOSE BLD-MCNC: 181 MG/DL (ref 74–109)
GLUCOSE BLD-MCNC: 229 MG/DL (ref 70–99)
GLUCOSE BLD-MCNC: 234 MG/DL (ref 70–99)
HCT VFR BLD CALC: 42.7 % (ref 42–52)
HEMOGLOBIN: 14.5 G/DL (ref 14–18)
IMMATURE GRANULOCYTES #: 0 K/UL
LIPASE: 46 U/L (ref 13–60)
LYMPHOCYTES ABSOLUTE: 2 K/UL (ref 1.1–4.5)
LYMPHOCYTES RELATIVE PERCENT: 21.4 % (ref 20–40)
MCH RBC QN AUTO: 32.4 PG (ref 27–31)
MCHC RBC AUTO-ENTMCNC: 34 G/DL (ref 33–37)
MCV RBC AUTO: 95.3 FL (ref 80–94)
MONOCYTES ABSOLUTE: 0.9 K/UL (ref 0–0.9)
MONOCYTES RELATIVE PERCENT: 10.3 % (ref 0–10)
NEUTROPHILS ABSOLUTE: 6.1 K/UL (ref 1.5–7.5)
NEUTROPHILS RELATIVE PERCENT: 66.5 % (ref 50–65)
PDW BLD-RTO: 12.7 % (ref 11.5–14.5)
PERFORMED ON: ABNORMAL
PLATELET # BLD: 184 K/UL (ref 130–400)
PMV BLD AUTO: 10.5 FL (ref 9.4–12.4)
POTASSIUM SERPL-SCNC: 4.8 MMOL/L (ref 3.5–5)
POTASSIUM SERPL-SCNC: 5.5 MMOL/L (ref 3.5–5)
POTASSIUM SERPL-SCNC: 6 MMOL/L (ref 3.5–5)
RBC # BLD: 4.48 M/UL (ref 4.7–6.1)
SODIUM BLD-SCNC: 138 MMOL/L (ref 136–145)
TOTAL PROTEIN: 7.5 G/DL (ref 6.6–8.7)
WBC # BLD: 9.1 K/UL (ref 4.8–10.8)

## 2020-10-07 PROCEDURE — 2580000003 HC RX 258: Performed by: STUDENT IN AN ORGANIZED HEALTH CARE EDUCATION/TRAINING PROGRAM

## 2020-10-07 PROCEDURE — 83690 ASSAY OF LIPASE: CPT

## 2020-10-07 PROCEDURE — 96361 HYDRATE IV INFUSION ADD-ON: CPT

## 2020-10-07 PROCEDURE — 2580000003 HC RX 258: Performed by: PEDIATRICS

## 2020-10-07 PROCEDURE — 99284 EMERGENCY DEPT VISIT MOD MDM: CPT

## 2020-10-07 PROCEDURE — 6370000000 HC RX 637 (ALT 250 FOR IP): Performed by: STUDENT IN AN ORGANIZED HEALTH CARE EDUCATION/TRAINING PROGRAM

## 2020-10-07 PROCEDURE — 76705 ECHO EXAM OF ABDOMEN: CPT

## 2020-10-07 PROCEDURE — 96374 THER/PROPH/DIAG INJ IV PUSH: CPT

## 2020-10-07 PROCEDURE — 2500000003 HC RX 250 WO HCPCS: Performed by: HOSPITALIST

## 2020-10-07 PROCEDURE — 74176 CT ABD & PELVIS W/O CONTRAST: CPT

## 2020-10-07 PROCEDURE — 2580000003 HC RX 258: Performed by: HOSPITALIST

## 2020-10-07 PROCEDURE — 99999 PR OFFICE/OUTPT VISIT,PROCEDURE ONLY: CPT | Performed by: PEDIATRICS

## 2020-10-07 PROCEDURE — 6360000002 HC RX W HCPCS

## 2020-10-07 PROCEDURE — 99283 EMERGENCY DEPT VISIT LOW MDM: CPT

## 2020-10-07 PROCEDURE — 76770 US EXAM ABDO BACK WALL COMP: CPT

## 2020-10-07 PROCEDURE — 82947 ASSAY GLUCOSE BLOOD QUANT: CPT

## 2020-10-07 PROCEDURE — 84132 ASSAY OF SERUM POTASSIUM: CPT

## 2020-10-07 PROCEDURE — 6370000000 HC RX 637 (ALT 250 FOR IP): Performed by: HOSPITALIST

## 2020-10-07 PROCEDURE — 6360000002 HC RX W HCPCS: Performed by: STUDENT IN AN ORGANIZED HEALTH CARE EDUCATION/TRAINING PROGRAM

## 2020-10-07 PROCEDURE — 36415 COLL VENOUS BLD VENIPUNCTURE: CPT

## 2020-10-07 PROCEDURE — 80053 COMPREHEN METABOLIC PANEL: CPT

## 2020-10-07 PROCEDURE — 1210000000 HC MED SURG R&B

## 2020-10-07 PROCEDURE — 96375 TX/PRO/DX INJ NEW DRUG ADDON: CPT

## 2020-10-07 PROCEDURE — 85025 COMPLETE CBC W/AUTO DIFF WBC: CPT

## 2020-10-07 RX ORDER — ACETAMINOPHEN 325 MG/1
650 TABLET ORAL EVERY 6 HOURS PRN
Status: DISCONTINUED | OUTPATIENT
Start: 2020-10-07 | End: 2020-10-09 | Stop reason: HOSPADM

## 2020-10-07 RX ORDER — 0.9 % SODIUM CHLORIDE 0.9 %
1000 INTRAVENOUS SOLUTION INTRAVENOUS ONCE
Status: COMPLETED | OUTPATIENT
Start: 2020-10-07 | End: 2020-10-07

## 2020-10-07 RX ORDER — RANOLAZINE 500 MG/1
500 TABLET, EXTENDED RELEASE ORAL 2 TIMES DAILY
Status: DISCONTINUED | OUTPATIENT
Start: 2020-10-07 | End: 2020-10-09 | Stop reason: HOSPADM

## 2020-10-07 RX ORDER — ASPIRIN 81 MG/1
81 TABLET ORAL DAILY
Status: DISCONTINUED | OUTPATIENT
Start: 2020-10-07 | End: 2020-10-09 | Stop reason: HOSPADM

## 2020-10-07 RX ORDER — ONDANSETRON 2 MG/ML
4 INJECTION INTRAMUSCULAR; INTRAVENOUS ONCE
Status: COMPLETED | OUTPATIENT
Start: 2020-10-07 | End: 2020-10-07

## 2020-10-07 RX ORDER — NICOTINE POLACRILEX 4 MG
15 LOZENGE BUCCAL PRN
Status: DISCONTINUED | OUTPATIENT
Start: 2020-10-07 | End: 2020-10-09 | Stop reason: HOSPADM

## 2020-10-07 RX ORDER — DEXTROSE MONOHYDRATE 25 G/50ML
12.5 INJECTION, SOLUTION INTRAVENOUS PRN
Status: DISCONTINUED | OUTPATIENT
Start: 2020-10-07 | End: 2020-10-09 | Stop reason: HOSPADM

## 2020-10-07 RX ORDER — AMLODIPINE BESYLATE 5 MG/1
10 TABLET ORAL DAILY
Status: DISCONTINUED | OUTPATIENT
Start: 2020-10-07 | End: 2020-10-09 | Stop reason: HOSPADM

## 2020-10-07 RX ORDER — MAGNESIUM SULFATE IN WATER 40 MG/ML
2 INJECTION, SOLUTION INTRAVENOUS PRN
Status: DISCONTINUED | OUTPATIENT
Start: 2020-10-07 | End: 2020-10-09 | Stop reason: HOSPADM

## 2020-10-07 RX ORDER — ISOSORBIDE MONONITRATE 30 MG/1
15 TABLET, EXTENDED RELEASE ORAL DAILY
Status: DISCONTINUED | OUTPATIENT
Start: 2020-10-07 | End: 2020-10-09 | Stop reason: HOSPADM

## 2020-10-07 RX ORDER — CARBAMAZEPINE 100 MG/1
100 TABLET, EXTENDED RELEASE ORAL
Status: DISCONTINUED | OUTPATIENT
Start: 2020-10-07 | End: 2020-10-09 | Stop reason: HOSPADM

## 2020-10-07 RX ORDER — ONDANSETRON 2 MG/ML
INJECTION INTRAMUSCULAR; INTRAVENOUS
Status: COMPLETED
Start: 2020-10-07 | End: 2020-10-07

## 2020-10-07 RX ORDER — HEPARIN SODIUM 5000 [USP'U]/ML
5000 INJECTION, SOLUTION INTRAVENOUS; SUBCUTANEOUS EVERY 8 HOURS SCHEDULED
Status: DISCONTINUED | OUTPATIENT
Start: 2020-10-07 | End: 2020-10-09 | Stop reason: HOSPADM

## 2020-10-07 RX ORDER — ONDANSETRON 2 MG/ML
4 INJECTION INTRAMUSCULAR; INTRAVENOUS EVERY 6 HOURS PRN
Status: DISCONTINUED | OUTPATIENT
Start: 2020-10-07 | End: 2020-10-09 | Stop reason: HOSPADM

## 2020-10-07 RX ORDER — INSULIN GLARGINE 100 [IU]/ML
50 INJECTION, SOLUTION SUBCUTANEOUS NIGHTLY
Status: DISCONTINUED | OUTPATIENT
Start: 2020-10-07 | End: 2020-10-09 | Stop reason: HOSPADM

## 2020-10-07 RX ORDER — DEXTROSE MONOHYDRATE 50 MG/ML
100 INJECTION, SOLUTION INTRAVENOUS PRN
Status: DISCONTINUED | OUTPATIENT
Start: 2020-10-07 | End: 2020-10-09 | Stop reason: HOSPADM

## 2020-10-07 RX ORDER — MORPHINE SULFATE 4 MG/ML
INJECTION, SOLUTION INTRAMUSCULAR; INTRAVENOUS
Status: COMPLETED
Start: 2020-10-07 | End: 2020-10-07

## 2020-10-07 RX ORDER — SODIUM CHLORIDE 0.9 % (FLUSH) 0.9 %
10 SYRINGE (ML) INJECTION PRN
Status: DISCONTINUED | OUTPATIENT
Start: 2020-10-07 | End: 2020-10-09 | Stop reason: HOSPADM

## 2020-10-07 RX ORDER — CARBAMAZEPINE 100 MG/1
300 TABLET, CHEWABLE ORAL
Status: DISCONTINUED | OUTPATIENT
Start: 2020-10-07 | End: 2020-10-09 | Stop reason: HOSPADM

## 2020-10-07 RX ORDER — MORPHINE SULFATE 4 MG/ML
2 INJECTION, SOLUTION INTRAMUSCULAR; INTRAVENOUS
Status: DISCONTINUED | OUTPATIENT
Start: 2020-10-07 | End: 2020-10-09 | Stop reason: HOSPADM

## 2020-10-07 RX ORDER — CLOPIDOGREL BISULFATE 75 MG/1
75 TABLET ORAL DAILY
Status: DISCONTINUED | OUTPATIENT
Start: 2020-10-07 | End: 2020-10-09 | Stop reason: HOSPADM

## 2020-10-07 RX ORDER — SODIUM CHLORIDE 9 MG/ML
INJECTION, SOLUTION INTRAVENOUS CONTINUOUS
Status: DISCONTINUED | OUTPATIENT
Start: 2020-10-07 | End: 2020-10-09 | Stop reason: HOSPADM

## 2020-10-07 RX ORDER — CHOLESTYRAMINE LIGHT 4 G/5.7G
4 POWDER, FOR SUSPENSION ORAL DAILY
Status: DISCONTINUED | OUTPATIENT
Start: 2020-10-07 | End: 2020-10-09 | Stop reason: HOSPADM

## 2020-10-07 RX ORDER — ATENOLOL 25 MG/1
100 TABLET ORAL DAILY
Status: DISCONTINUED | OUTPATIENT
Start: 2020-10-07 | End: 2020-10-09 | Stop reason: HOSPADM

## 2020-10-07 RX ORDER — SODIUM POLYSTYRENE SULFONATE 15 G/60ML
15 SUSPENSION ORAL; RECTAL ONCE
Status: COMPLETED | OUTPATIENT
Start: 2020-10-07 | End: 2020-10-07

## 2020-10-07 RX ORDER — PANTOPRAZOLE SODIUM 40 MG/1
40 TABLET, DELAYED RELEASE ORAL
Status: DISCONTINUED | OUTPATIENT
Start: 2020-10-07 | End: 2020-10-09 | Stop reason: HOSPADM

## 2020-10-07 RX ORDER — ATORVASTATIN CALCIUM 80 MG/1
80 TABLET, FILM COATED ORAL DAILY
Status: DISCONTINUED | OUTPATIENT
Start: 2020-10-07 | End: 2020-10-09 | Stop reason: HOSPADM

## 2020-10-07 RX ORDER — POLYETHYLENE GLYCOL 3350 17 G/17G
17 POWDER, FOR SOLUTION ORAL DAILY PRN
Status: DISCONTINUED | OUTPATIENT
Start: 2020-10-07 | End: 2020-10-09 | Stop reason: HOSPADM

## 2020-10-07 RX ORDER — CLOPIDOGREL BISULFATE 75 MG/1
75 TABLET ORAL DAILY
COMMUNITY
Start: 2020-08-27

## 2020-10-07 RX ORDER — SODIUM CHLORIDE 0.9 % (FLUSH) 0.9 %
10 SYRINGE (ML) INJECTION EVERY 12 HOURS SCHEDULED
Status: DISCONTINUED | OUTPATIENT
Start: 2020-10-07 | End: 2020-10-09 | Stop reason: HOSPADM

## 2020-10-07 RX ORDER — PROMETHAZINE HYDROCHLORIDE 25 MG/1
12.5 TABLET ORAL EVERY 6 HOURS PRN
Status: DISCONTINUED | OUTPATIENT
Start: 2020-10-07 | End: 2020-10-09 | Stop reason: HOSPADM

## 2020-10-07 RX ORDER — ACETAMINOPHEN 650 MG/1
650 SUPPOSITORY RECTAL EVERY 6 HOURS PRN
Status: DISCONTINUED | OUTPATIENT
Start: 2020-10-07 | End: 2020-10-09 | Stop reason: HOSPADM

## 2020-10-07 RX ORDER — POTASSIUM CHLORIDE 750 MG/1
10 TABLET, EXTENDED RELEASE ORAL 2 TIMES DAILY
Status: ON HOLD | COMMUNITY
End: 2020-10-09 | Stop reason: HOSPADM

## 2020-10-07 RX ORDER — RANOLAZINE 500 MG/1
500 TABLET, EXTENDED RELEASE ORAL 2 TIMES DAILY
COMMUNITY

## 2020-10-07 RX ORDER — DEXTROSE MONOHYDRATE 25 G/50ML
25 INJECTION, SOLUTION INTRAVENOUS ONCE
Status: COMPLETED | OUTPATIENT
Start: 2020-10-07 | End: 2020-10-07

## 2020-10-07 RX ADMIN — SODIUM CHLORIDE, PRESERVATIVE FREE 10 ML: 5 INJECTION INTRAVENOUS at 10:49

## 2020-10-07 RX ADMIN — HEPARIN SODIUM 5000 UNITS: 5000 INJECTION INTRAVENOUS; SUBCUTANEOUS at 20:41

## 2020-10-07 RX ADMIN — RANOLAZINE 500 MG: 500 TABLET, FILM COATED, EXTENDED RELEASE ORAL at 09:27

## 2020-10-07 RX ADMIN — ONDANSETRON 4 MG: 2 INJECTION INTRAMUSCULAR; INTRAVENOUS at 04:24

## 2020-10-07 RX ADMIN — ISOSORBIDE MONONITRATE 15 MG: 30 TABLET, EXTENDED RELEASE ORAL at 09:27

## 2020-10-07 RX ADMIN — SODIUM CHLORIDE, PRESERVATIVE FREE 10 ML: 5 INJECTION INTRAVENOUS at 23:41

## 2020-10-07 RX ADMIN — INSULIN GLARGINE 50 UNITS: 100 INJECTION, SOLUTION SUBCUTANEOUS at 20:41

## 2020-10-07 RX ADMIN — CARBAMAZEPINE 100 MG: 100 TABLET, EXTENDED RELEASE ORAL at 09:59

## 2020-10-07 RX ADMIN — CARBAMAZEPINE 300 MG: 100 TABLET, CHEWABLE ORAL at 18:19

## 2020-10-07 RX ADMIN — INSULIN LISPRO 4 UNITS: 100 INJECTION, SOLUTION INTRAVENOUS; SUBCUTANEOUS at 12:40

## 2020-10-07 RX ADMIN — INSULIN LISPRO 2 UNITS: 100 INJECTION, SOLUTION INTRAVENOUS; SUBCUTANEOUS at 09:35

## 2020-10-07 RX ADMIN — RANOLAZINE 500 MG: 500 TABLET, FILM COATED, EXTENDED RELEASE ORAL at 20:41

## 2020-10-07 RX ADMIN — SODIUM POLYSTYRENE SULFONATE 15 G: 15 SUSPENSION ORAL; RECTAL at 11:43

## 2020-10-07 RX ADMIN — MORPHINE SULFATE 2 MG: 4 INJECTION, SOLUTION INTRAMUSCULAR; INTRAVENOUS at 04:25

## 2020-10-07 RX ADMIN — SODIUM CHLORIDE 1000 ML: 9 INJECTION, SOLUTION INTRAVENOUS at 04:24

## 2020-10-07 RX ADMIN — HEPARIN SODIUM 5000 UNITS: 5000 INJECTION INTRAVENOUS; SUBCUTANEOUS at 09:36

## 2020-10-07 RX ADMIN — DEXTROSE MONOHYDRATE 25 G: 25 INJECTION, SOLUTION INTRAVENOUS at 11:44

## 2020-10-07 RX ADMIN — ATORVASTATIN CALCIUM 80 MG: 80 TABLET, FILM COATED ORAL at 09:27

## 2020-10-07 RX ADMIN — CHOLESTYRAMINE 4 G: 4 POWDER, FOR SUSPENSION ORAL at 09:28

## 2020-10-07 RX ADMIN — ATENOLOL 100 MG: 25 TABLET ORAL at 09:27

## 2020-10-07 RX ADMIN — PANTOPRAZOLE SODIUM 40 MG: 40 TABLET, DELAYED RELEASE ORAL at 09:27

## 2020-10-07 RX ADMIN — ASPIRIN 81 MG: 81 TABLET, COATED ORAL at 09:27

## 2020-10-07 RX ADMIN — CLOPIDOGREL BISULFATE 75 MG: 75 TABLET ORAL at 09:27

## 2020-10-07 RX ADMIN — INSULIN HUMAN 10 UNITS: 100 INJECTION, SOLUTION PARENTERAL at 11:44

## 2020-10-07 RX ADMIN — SODIUM CHLORIDE: 9 INJECTION, SOLUTION INTRAVENOUS at 09:59

## 2020-10-07 RX ADMIN — AMLODIPINE BESYLATE 10 MG: 5 TABLET ORAL at 09:27

## 2020-10-07 RX ADMIN — SODIUM BICARBONATE 50 MEQ: 84 INJECTION, SOLUTION INTRAVENOUS at 11:43

## 2020-10-07 ASSESSMENT — ENCOUNTER SYMPTOMS
RHINORRHEA: 0
COUGH: 0
SHORTNESS OF BREATH: 0
NAUSEA: 1
DIARRHEA: 1
EYE DISCHARGE: 0
ABDOMINAL PAIN: 1
BACK PAIN: 0
VOMITING: 1
COLOR CHANGE: 0

## 2020-10-07 ASSESSMENT — PAIN SCALES - GENERAL
PAINLEVEL_OUTOF10: 1
PAINLEVEL_OUTOF10: 4
PAINLEVEL_OUTOF10: 0
PAINLEVEL_OUTOF10: 2

## 2020-10-07 ASSESSMENT — PAIN DESCRIPTION - PAIN TYPE: TYPE: ACUTE PAIN

## 2020-10-07 ASSESSMENT — PAIN DESCRIPTION - LOCATION: LOCATION: ABDOMEN

## 2020-10-07 ASSESSMENT — PAIN DESCRIPTION - ORIENTATION: ORIENTATION: RIGHT;UPPER

## 2020-10-07 ASSESSMENT — PAIN DESCRIPTION - DESCRIPTORS: DESCRIPTORS: ACHING

## 2020-10-07 NOTE — PROGRESS NOTES
4 Eyes Skin Assessment    Rakesh Spears is being assessed upon: Admission    I agree that I, Barbara Willis, along with Jeremy Castaneda (either 2 RN's or 1 LPN and 1 RN) have performed a thorough Head to Toe Skin Assessment on the patient. ALL assessment sites listed below have been assessed. Areas assessed by both nurses:     [x]   Head, Face, and Ears   [x]   Shoulders, Back, and Chest  [x]   Arms, Elbows, and Hands   [x]   Coccyx, Sacrum, and Ischium  [x]   Legs, Feet, and Heels    Does the Patient have Skin Breakdown?  No    [place] Prevention initiated: No  Wound Care Orders initiated: No    [facility] nurse consulted for Pressure Injury (Stage 3,4, Unstageable, DTI, NWPT, and Complex wounds) and New or Established Ostomies: No        Primary Nurse eSignature: Barbara Willis RN on 10/7/2020 at 8:09 AM      Co-Signer eSignature: {Esignature:[id number]}

## 2020-10-07 NOTE — ED PROVIDER NOTES
[facility]  eMERGENCY dEPARTMENT eNCOUnter      Pt Name: Rakesh Spears  MRN: [id number]  [place] 1950  Date of evaluation: 10/7/2020  Provider: Alize Shaffer MD    [address] [place] [address]       Chief Complaint   Patient presents with    Abdominal Pain    Nausea    Diarrhea         HISTORY OF PRESENT ILLNESS   (Location/Symptom, Timing/Onset,Context/Setting, Quality, Duration, Modifying Factors, Severity)  Note limiting factors. Rakesh Spears is a 68 y.o. male who presents to the emergency department with abdominal pain, vomiting, and diarrhea. Patient states symptoms been ongoing for the past 3 to 4 days. Patient states he has had multiple episodes of diarrhea \"every 3 to 4 minutes. \"  [place] pain is located in his right mid abdomen and is described as \"dull. \"  Patient states pain radiates to his back. Patient has been having this pain intermittently \"for months. \"  Patient began vomiting 3 days ago but has vomited 3-4 times since yesterday. Pain is rated at a scale of 3-4 over 10. Patient states when he turns on his right side it increases pain. Patient states that he believes greasy food causes pain to occur. Patient ate at [facility] prior to onset of this pain. Patient denies black or bloody stool, fever, cough, congestion, chest pain, difficulty breathing, lower extremity pain or swelling, or rash. HPI    NursingNotes were reviewed. REVIEW OF SYSTEMS    (2-9 systems for level 4, 10 or more for level 5)     Review of Systems   Constitutional: Negative for chills and fever. HENT: Negative for congestion and rhinorrhea. Eyes: Negative for discharge. Respiratory: Negative for cough and shortness of breath. Cardiovascular: Negative for chest pain and palpitations. Gastrointestinal: Positive for abdominal pain, diarrhea, nausea and vomiting. Genitourinary: Negative for difficulty urinating and dysuria. Musculoskeletal: Negative for back pain and neck pain.    Skin: Negative for color change and pallor. Neurological: Negative for syncope and light-headedness. Psychiatric/Behavioral: Negative for agitation and confusion. All other systems reviewed and are negative.            PAST MEDICALHISTORY     Past Medical History:   Diagnosis Date    Acid reflux     Arrhythmia     Diabetes mellitus ([address]     Double vision     Glaucoma     Hyperchloremia     Hypertension     Obesity     Tic douloureux     Vision problems     wears glass         SURGICAL HISTORY       Past Surgical History:   Procedure Laterality Date    CARDIAC CATHETERIZATION      CORONARY ANGIOPLASTY WITH STENT PLACEMENT           CURRENT MEDICATIONS     Previous Medications    AMLODIPINE (NORVASC) 10 MG TABLET    Take 10 mg by mouth daily    ASPIRIN 81 MG EC TABLET    Take 81 mg by mouth daily    ATENOLOL (TENORMIN) 100 MG TABLET    Take 100 mg by mouth daily    ATORVASTATIN (LIPITOR) 80 MG TABLET    Take 80 mg by mouth daily    CARBAMAZEPINE (TEGRETOL XR) 100 MG EXTENDED RELEASE TABLET    Take 100 mg by mouth every morning (before breakfast) Indications: 4am 4 tabs at breakfast, 3 at lunch, 3 tabs at bedtime    CARBAMAZEPINE (TEGRETOL) 100 MG CHEWABLE TABLET    Take 300 mg by mouth Daily with supper    CARBAMAZEPINE (TEGRETOL) 100 MG CHEWABLE TABLET    Take 300 mg by mouth nightly    CLOPIDOGREL (PLAVIX) 75 MG TABLET    Take 75 mg by mouth daily    COLESTIPOL (COLESTID) 1 G TABLET    Take 1 g by mouth 2 times daily    DOCUSATE SODIUM (COLACE) 100 MG CAPSULE    Take 100 mg by mouth 2 times daily    DOXAZOSIN (CARDURA) 4 MG TABLET    Take 4 mg by mouth nightly    EMPAGLIFLOZIN (JARDIANCE) 10 MG TABLET    Take 10 mg by mouth daily    INSULIN GLARGINE (LANTUS) 100 UNIT/ML INJECTION VIAL    Inject 200 Units into the skin every morning (before breakfast)     ISOSORBIDE MONONITRATE (IMDUR) 30 MG EXTENDED RELEASE TABLET    Take 15 mg by mouth daily    LISINOPRIL (PRINIVIL;ZESTRIL) 40 MG TABLET    Take 40 mg by mouth 2 times daily    METFORMIN (GLUCOPHAGE) 500 MG TABLET    Take 500 mg by mouth 2 times daily (with meals)    MULTIPLE VITAMINS-MINERALS (EYE VITAMINS) CAPS    Take by mouth    OMEGA-3 FATTY ACIDS (FISH OIL) 1000 MG CAPS    Take 3,000 mg by mouth 2 times daily    OMEPRAZOLE (PRILOSEC) 20 MG DELAYED RELEASE CAPSULE    Take 40 mg by mouth daily    POTASSIUM CHLORIDE (KLOR-CON M) 10 MEQ EXTENDED RELEASE TABLET    Take 10 mEq by mouth 2 times daily    RANOLAZINE (RANEXA) 500 MG EXTENDED RELEASE TABLET    Take 500 mg by mouth 2 times daily    SITAGLIPTIN (JANUVIA) 100 MG TABLET    Take 100 mg by mouth daily    SPIRONOLACTONE (ALDACTONE) 25 MG TABLET    Take 25 mg by mouth daily    VITAMIN D (CHOLECALCIFEROL) 1000 UNIT TABS TABLET    Take 1,000 Units by mouth daily       ALLERGIES     Patient has no known allergies.     FAMILY HISTORY       Family History   Problem Relation Age of Onset    Heart Disease Mother     High Blood Pressure Mother     Heart Disease Father     High Blood Pressure Father     Heart Disease Sister     High Blood Pressure Sister           SOCIAL HISTORY       Social History     Socioeconomic History    Marital status:      Spouse name: None    Number of children: None    Years of education: None    Highest education level: None   Occupational History    None   Social Needs    Financial resource strain: None    Food insecurity     Worry: None     Inability: None    Transportation needs     Medical: None     Non-medical: None   Tobacco Use    Smoking status: Never Smoker   Substance and Sexual Activity    Alcohol use: No    Drug use: No    Sexual activity: None   Lifestyle    Physical activity     Days per week: None     Minutes per session: None    Stress: None   Relationships    Social connections     Talks on phone: None     Gets together: None     Attends [employer] service: None     Active member of club or organization: None     Attends meetings of clubs or organizations: None     Relationship status: None    Intimate partner violence     Fear of current or ex partner: None     Emotionally abused: None     Physically abused: None     Forced sexual activity: None   Other Topics Concern    None   Social History Narrative    None       SCREENINGS             PHYSICAL EXAM    (up to 7 for level 4, 8 or more for level 5)     ED Triage Vitals [10/07/20 0212]   BP Temp Temp Source Pulse Resp SpO2 Height Weight   (!) 147/66 98.1 °F (36.7 °C) Oral 77 16 96 % 6' (1.829 m) 226 lb (102.5 kg)       Physical Exam  Vitals signs and nursing note reviewed. Constitutional:       General: He is not in acute distress. Appearance: Normal appearance. HENT:      Head: Normocephalic and atraumatic. Right Ear: External ear normal.      Left Ear: External ear normal.      Nose: Nose normal.      Mouth/Throat:      Mouth: Mucous membranes are moist.      Pharynx: Oropharynx is clear. No oropharyngeal exudate. Eyes:      General: No scleral icterus. Conjunctiva/sclera: Conjunctivae normal.      Pupils: Pupils are equal, round, and reactive to light. Neck:      Musculoskeletal: Neck supple. No neck rigidity. Cardiovascular:      Rate and Rhythm: Normal rate and regular rhythm. Pulses: Normal pulses. Heart sounds: Normal heart sounds. Pulmonary:      Effort: Pulmonary effort is normal.      Breath sounds: Normal breath sounds. Abdominal:      General: Bowel sounds are normal.      Palpations: Abdomen is soft. Tenderness: There is abdominal tenderness (Right middle abdomen with tenderness to palpation without guarding or rebound. ). There is no right CVA tenderness, left CVA tenderness or guarding. Musculoskeletal:         General: No tenderness or deformity. Skin:     General: Skin is warm and dry. Capillary Refill: Capillary refill takes less than 2 seconds. Coloration: Skin is not jaundiced. Neurological:      General: No focal deficit present. Mental Status: He is alert and oriented to person, place, and time. Mental status is at baseline. Coordination: Coordination normal.   Psychiatric:         Mood and Affect: Mood normal.         Behavior: Behavior normal.         DIAGNOSTIC RESULTS     RADIOLOGY:  Non-plain film images such as CT, Ultrasound and MRI are read by the radiologist. Plain radiographic images are visualized and preliminarily interpreted bythe emergency physician with the below findings:      CT ABDOMEN PELVIS WO CONTRAST Additional Contrast? None   Final Result   No acute abnormality of the abdomen or pelvis. Appendix is normal.   A 2 mm nonobstructing calculus in the mid left kidney. Bilateral renal cysts which are incompletely evaluated in this study. Sonography may be obtained for further follow-up. A 7 mm noncalcified nodule in the right lower lobe is incompletely   evaluated. Further evaluation with CT scan of the chest may be   obtained. The above study was initially reviewed and reported by [facility]. I do   not find any discrepancies. Signed by Dr Camilla Rose on 10/7/2020 6:51 AM      [address] [place] organ? LIVER, GALLBLADDER, PANCREAS    (Results Pending)   US RENAL COMPLETE    (Results Pending)           LABS:  Labs Reviewed   CBC WITH AUTO DIFFERENTIAL - Abnormal; Notable for the following components:       Result Value    RBC 4.48 (*)     MCV 95.3 (*)     MCH 32.4 (*)     Neutrophils % 66.5 (*)     Monocytes % 10.3 (*)     All other components within normal limits   COMPREHENSIVE METABOLIC PANEL - Abnormal; Notable for the following components:    Potassium 5.5 (*)     Glucose 181 (*)     BUN 56 (*)     CREATININE 2.7 (*)     GFR Non- 23 (*)     GFR  28 (*)     All other components within normal limits   LIPASE   POTASSIUM   POCT GLUCOSE   POCT GLUCOSE   POCT GLUCOSE       All other labs were within normal range or not returned as of this dictation.     EMERGENCY DEPARTMENT COURSE and DIFFERENTIAL DIAGNOSIS/MDM:   Vitals:    Vitals:    10/07/20 0212 10/07/20 0310 10/07/20 0430 10/07/20 0600   BP: (!) 147/66 115/68 (!) 109/46 (!) 103/44   Pulse: 77   63   Resp: 16   14   Temp: 98.1 °F (36.7 °C)      TempSrc: Oral      SpO2: 96%  92% 95%   Weight: 226 lb (102.5 kg)      Height: 6' (1.829 m)          MDM  13-year-old male presents with abdominal pain, vomiting, and diarrhea. Lab and radiology results reviewed. Patient found to have acute renal insufficiency. Patient admitted for further evaluation and treatment. Discussed with Dr. Divina Zaldivar, hospitalist.  IV fluids, IV Zofran, and IV morphine 2 mg given in the emergency department. CONSULTS:  None    PROCEDURES:  Unless otherwise noted below, none     Procedures    FINAL IMPRESSION      1. Acute renal failure, unspecified acute renal failure type ([address]    2. Gastroenteritis    3. Dehydration          DISPOSITION/PLAN   DISPOSITION Admitted 10/07/2020 06:16:06 AM      PATIENT REFERRED TO:  No follow-up provider specified.     DISCHARGE MEDICATIONS:  New Prescriptions    No medications on file          (Please note that portions of this note were completed with a voice recognition program.  Efforts were made to edit thedictations but occasionally words are mis-transcribed.)    Hitesh Escamilla MD (electronically signed)  Attending Emergency Physician          Hitesh Escamilla MD  10/07/20 7935

## 2020-10-07 NOTE — PROGRESS NOTES
Huong Cruz arrived to room # 324. Presented with: TREY  Mental Status: Patient is oriented, alert, coherent, logical, thought processes intact and able to concentrate and follow conversation. Vitals:    10/07/20 0745   BP: 123/74   Pulse: 67   Resp: 16   Temp: 96.4 °F (35.8 °C)   SpO2: 96%     Patient safety contract and falls prevention contract reviewed with patient Yes. Oriented Patient and Family to room. Call light within reach. Yes.   Needs, issues or concerns expressed at this time: no.      Electronically signed by Robby Woodard RN on 10/7/2020 at 7:55 AM

## 2020-10-07 NOTE — H&P
Hospitalist: History and Physical    Date: 10/7/2020 Time: 6:16 AM    Name: Isac Kidd : 1950 MRN: [id number]    Code Status: No Order No additional code details    PCP: Erna Preciado MD, MD    Patient's Chief Complaint Is: Nausea vomiting diarrhea    HPI: Patient is a 68 y.o. male with a past medical history of DM, hyper tension, obesity, GERD. Patient presented to [facility] ED due to 3 to 4 days of nausea, vomiting and diarrhea. No hematemesis, hematochezia or melena. He is also had associated right upper abdominal pain. He has not had a cholecystectomy. In the ED, work-up significant for potassium 5.5, BUN 56, creatinine 2.7 (chart review shows most recent creatinine of 0.8). Blood glucose 181. CT abdomen/pelvis obtained showed some findings throughout the bowel concerning for an enteritis.         Past Medical History:   Diagnosis Date    Acid reflux     Arrhythmia     Diabetes mellitus ([address]     Double vision     Glaucoma     Hyperchloremia     Hypertension     Obesity     Tic douloureux     Vision problems     wears glass     Past Surgical History:   Procedure Laterality Date    CARDIAC CATHETERIZATION      CORONARY ANGIOPLASTY WITH STENT PLACEMENT       Family History   Problem Relation Age of Onset    Heart Disease Mother     High Blood Pressure Mother     Heart Disease Father     High Blood Pressure Father     Heart Disease Sister     High Blood Pressure Sister      Social History     Socioeconomic History    Marital status:      Spouse name: Not on file    Number of children: Not on file    Years of education: Not on file    Highest education level: Not on file   Occupational History    Not on file   Social Needs    Financial resource strain: Not on file    Food insecurity     Worry: Not on file     Inability: Not on file    Transportation needs     Medical: Not on file     Non-medical: Not on file   Tobacco Use    Smoking status: Never Smoker Substance and Sexual Activity    Alcohol use: No    Drug use: No    Sexual activity: Not on file   Lifestyle    Physical activity     Days per week: Not on file     Minutes per session: Not on file    Stress: Not on file   Relationships    Social connections     Talks on phone: Not on file     Gets together: Not on file     Attends Jew service: Not on file     Active member of club or organization: Not on file     Attends meetings of clubs or organizations: Not on file     Relationship status: Not on file    Intimate partner violence     Fear of current or ex partner: Not on file     Emotionally abused: Not on file     Physically abused: Not on file     Forced sexual activity: Not on file   Other Topics Concern    Not on file   Social History Narrative    Not on file     No Known Allergies  Prior to Admission medications    Medication Sig Start Date End Date Taking?  Authorizing Provider   clopidogrel (PLAVIX) 75 MG tablet Take 75 mg by mouth daily 8/27/20  Yes Historical Provider, MD   empagliflozin (JARDIANCE) 10 MG tablet Take 10 mg by mouth daily   Yes Historical Provider, MD   ranolazine (RANEXA) 500 MG extended release tablet Take 500 mg by mouth 2 times daily   Yes Historical Provider, MD   potassium chloride (KLOR-CON M) 10 MEQ extended release tablet Take 10 mEq by mouth 2 times daily   Yes Historical Provider, MD   insulin glargine (LANTUS) 100 UNIT/ML injection vial Inject 200 Units into the skin every morning (before breakfast)    Yes Historical Provider, MD   SITagliptin (JANUVIA) 100 MG tablet Take 100 mg by mouth daily   Yes Historical Provider, MD   doxazosin (CARDURA) 4 MG tablet Take 4 mg by mouth nightly   Yes Historical Provider, MD   lisinopril (PRINIVIL;ZESTRIL) 40 MG tablet Take 40 mg by mouth 2 times daily   Yes Historical Provider, MD   colestipol (COLESTID) 1 G tablet Take 1 g by mouth 2 times daily   Yes Historical Provider, MD   amLODIPine (NORVASC) 10 MG tablet Take 10 mg by mouth daily   Yes Historical Provider, MD   carBAMazepine (TEGRETOL XR) 100 MG extended release tablet Take 100 mg by mouth every morning (before breakfast) Indications: 4am 4 tabs at breakfast, 3 at lunch, 3 tabs at bedtime   Yes Historical Provider, MD   omeprazole (PRILOSEC) 20 MG delayed release capsule Take 40 mg by mouth daily   Yes Historical Provider, MD   metFORMIN (GLUCOPHAGE) 500 MG tablet Take 500 mg by mouth 2 times daily (with meals)   Yes Historical Provider, MD   atenolol (TENORMIN) 100 MG tablet Take 100 mg by mouth daily   Yes Historical Provider, MD   spironolactone (ALDACTONE) 25 MG tablet Take 25 mg by mouth daily   Yes Historical Provider, MD   aspirin 81 MG EC tablet Take 81 mg by mouth daily   Yes Historical Provider, MD   atorvastatin (LIPITOR) 80 MG tablet Take 80 mg by mouth daily   Yes Historical Provider, MD   isosorbide mononitrate (IMDUR) 30 MG extended release tablet Take 15 mg by mouth daily   Yes Historical Provider, MD   carBAMazepine (TEGRETOL) 100 MG chewable tablet Take 300 mg by mouth Daily with supper    Historical Provider, MD   carBAMazepine (TEGRETOL) 100 MG chewable tablet Take 300 mg by mouth nightly    Historical Provider, MD   docusate sodium (COLACE) 100 MG capsule Take 100 mg by mouth 2 times daily    Historical Provider, MD   Omega-3 Fatty Acids (FISH OIL) 1000 MG CAPS Take 3,000 mg by mouth 2 times daily    Historical Provider, MD   vitamin D (CHOLECALCIFEROL) 1000 UNIT TABS tablet Take 1,000 Units by mouth daily    Historical Provider, MD   Multiple Vitamins-Minerals (EYE VITAMINS) CAPS Take by mouth    Historical Provider, MD       I have reviewed all pertinent history. Prior medical records and laboratory evaluation reviewed. Imaging independently reviewed. Review of Systems:   As per HPI, otherwise all other ROS performed and found to be negative at this time.     Physical Exam:  Vitals:    10/07/20 0600   BP: (!) 103/44   Pulse: 63   Resp: 14   Temp: SpO2: 95%     CONSTITUTIONAL: Appears ill, no acute distress  PSYCH: Judgement and insight are normal. Mental status alert and oriented to person, place and time. Mood and affect are normal  HEENT: Normocephalic/atraumatic, no scleral icterus, dry mucous membranes  NECK: Trachea is midline. Thyroid is non-tender. LUNGS: Normal respiratory effort, no intercostal retractions or accessory muscle use. Clear to auscultation bilaterally with no crackles, wheezes or rales  CARDIOVASCULAR: Regular rate and rhythm, no murmurs, rubs or gallops. No JVD. Pulses are equal bilaterally. GI/ABDOMEN: Soft, tenderness and RUQ and epigastric region without rebound or guarding. Bowel sounds are normal.   SKIN: Warm and dry.    NEUROLOGICAL: No deficits  EXTREMITIES: Normal ROM    Labs:   Recent Results (from the past 72 hour(s))   CBC Auto Differential    Collection Time: 10/07/20  2:23 AM   Result Value Ref Range    WBC 9.1 4.8 - 10.8 K/uL    RBC 4.48 (L) 4.70 - 6.10 M/uL    Hemoglobin 14.5 14.0 - 18.0 g/dL    Hematocrit 42.7 42.0 - 52.0 %    MCV 95.3 (H) 80.0 - 94.0 fL    MCH 32.4 (H) 27.0 - 31.0 pg    MCHC 34.0 33.0 - 37.0 g/dL    RDW 12.7 11.5 - 14.5 %    Platelets 166 478 - 174 K/uL    MPV 10.5 9.4 - 12.4 fL    Neutrophils % 66.5 (H) 50.0 - 65.0 %    Lymphocytes % 21.4 20.0 - 40.0 %    Monocytes % 10.3 (H) 0.0 - 10.0 %    Eosinophils % 1.2 0.0 - 5.0 %    Basophils % 0.2 0.0 - 1.0 %    Neutrophils Absolute 6.1 1.5 - 7.5 K/uL    Immature Granulocytes # 0.0 K/uL    Lymphocytes Absolute 2.0 1.1 - 4.5 K/uL    Monocytes Absolute 0.90 0.00 - 0.90 K/uL    Eosinophils Absolute 0.10 0.00 - 0.60 K/uL    Basophils Absolute 0.00 0.00 - 0.20 K/uL   Comprehensive Metabolic Panel    Collection Time: 10/07/20  2:23 AM   Result Value Ref Range    Sodium 138 136 - 145 mmol/L    Potassium 5.5 (H) 3.5 - 5.0 mmol/L    Chloride 103 98 - 111 mmol/L    CO2 22 22 - 29 mmol/L    Anion Gap 13 7 - 19 mmol/L    Glucose 181 (H) 74 - 109 mg/dL    BUN 56 (H) 8 - 23 mg/dL    CREATININE 2.7 (H) 0.5 - 1.2 mg/dL    GFR Non-African American 23 (A) >60    GFR  28 (L) >59    Calcium 10.0 8.8 - 10.2 mg/dL    Total Protein 7.5 6.6 - 8.7 g/dL    Alb 4.5 3.5 - 5.2 g/dL    Total Bilirubin 0.3 0.2 - 1.2 mg/dL    Alkaline Phosphatase 91 40 - 130 U/L    ALT 19 5 - 41 U/L    AST 14 5 - 40 U/L   Lipase    Collection Time: 10/07/20  2:23 AM   Result Value Ref Range    Lipase 46 13 - 60 U/L       Imaging: No results found. Assessment/Plan:     Active Problems:    Acute kidney injury ([address]  Resolved Problems:    * No resolved hospital problems.  *       TREY, prerenal with vomiting and diarrhea  - IV fluids, monitor for improvement  -avoid nephrotoxic agents, hold home ACE-I and Aldactone  - Work-up further if no significant improvement with IV fluids  -Renal ultrasound    Hyperkalemia  - Repeat K and treat if over 6    Right upper quadrant pain  - Follow-up right upper quadrant ultrasound    Vomiting and diarrhea in setting of enteritis noted on imaging  -PRN antiemetics  -Supportive care for likely viral enteritis  - Replace volume losses with IV fluids    DM  -hold home PO meds  -poc glucose q6h  -sliding scale insulin as needed    Other chronic issues  HTN- continue appropriate home meds, hold ACE-I and aldactone  Cardiovascular disease - continue aspirin, plavix  GERD -PPI  Continue all other appropriate home meds for chronic conditions    DVT prophylaxis-Heparin    Veronica Lucas  10/7/2020 6:16 AM

## 2020-10-08 LAB
ANION GAP SERPL CALCULATED.3IONS-SCNC: 10 MMOL/L (ref 7–19)
BASOPHILS ABSOLUTE: 0 K/UL (ref 0–0.2)
BASOPHILS RELATIVE PERCENT: 0.5 % (ref 0–1)
BUN BLDV-MCNC: 45 MG/DL (ref 8–23)
CALCIUM SERPL-MCNC: 8.8 MG/DL (ref 8.8–10.2)
CHLORIDE BLD-SCNC: 111 MMOL/L (ref 98–111)
CO2: 22 MMOL/L (ref 22–29)
CREAT SERPL-MCNC: 1.4 MG/DL (ref 0.5–1.2)
EOSINOPHILS ABSOLUTE: 0.1 K/UL (ref 0–0.6)
EOSINOPHILS RELATIVE PERCENT: 1.9 % (ref 0–5)
GFR AFRICAN AMERICAN: >59
GFR NON-AFRICAN AMERICAN: 50
GLUCOSE BLD-MCNC: 160 MG/DL (ref 70–99)
GLUCOSE BLD-MCNC: 177 MG/DL (ref 70–99)
GLUCOSE BLD-MCNC: 178 MG/DL (ref 70–99)
GLUCOSE BLD-MCNC: 74 MG/DL (ref 74–109)
GLUCOSE BLD-MCNC: 97 MG/DL (ref 70–99)
HCT VFR BLD CALC: 38 % (ref 42–52)
HEMOGLOBIN: 12.7 G/DL (ref 14–18)
IMMATURE GRANULOCYTES #: 0 K/UL
LYMPHOCYTES ABSOLUTE: 2 K/UL (ref 1.1–4.5)
LYMPHOCYTES RELATIVE PERCENT: 31.7 % (ref 20–40)
MCH RBC QN AUTO: 32.3 PG (ref 27–31)
MCHC RBC AUTO-ENTMCNC: 33.4 G/DL (ref 33–37)
MCV RBC AUTO: 96.7 FL (ref 80–94)
MONOCYTES ABSOLUTE: 0.8 K/UL (ref 0–0.9)
MONOCYTES RELATIVE PERCENT: 12.1 % (ref 0–10)
NEUTROPHILS ABSOLUTE: 3.3 K/UL (ref 1.5–7.5)
NEUTROPHILS RELATIVE PERCENT: 53.5 % (ref 50–65)
PDW BLD-RTO: 12.4 % (ref 11.5–14.5)
PERFORMED ON: ABNORMAL
PERFORMED ON: NORMAL
PLATELET # BLD: 139 K/UL (ref 130–400)
PMV BLD AUTO: 10.5 FL (ref 9.4–12.4)
POTASSIUM REFLEX MAGNESIUM: 4.5 MMOL/L (ref 3.5–5)
RBC # BLD: 3.93 M/UL (ref 4.7–6.1)
SODIUM BLD-SCNC: 143 MMOL/L (ref 136–145)
WBC # BLD: 6.2 K/UL (ref 4.8–10.8)

## 2020-10-08 PROCEDURE — 2580000003 HC RX 258: Performed by: STUDENT IN AN ORGANIZED HEALTH CARE EDUCATION/TRAINING PROGRAM

## 2020-10-08 PROCEDURE — 36415 COLL VENOUS BLD VENIPUNCTURE: CPT

## 2020-10-08 PROCEDURE — 1210000000 HC MED SURG R&B

## 2020-10-08 PROCEDURE — 85025 COMPLETE CBC W/AUTO DIFF WBC: CPT

## 2020-10-08 PROCEDURE — 80048 BASIC METABOLIC PNL TOTAL CA: CPT

## 2020-10-08 PROCEDURE — 6360000002 HC RX W HCPCS: Performed by: STUDENT IN AN ORGANIZED HEALTH CARE EDUCATION/TRAINING PROGRAM

## 2020-10-08 PROCEDURE — 6370000000 HC RX 637 (ALT 250 FOR IP): Performed by: STUDENT IN AN ORGANIZED HEALTH CARE EDUCATION/TRAINING PROGRAM

## 2020-10-08 PROCEDURE — 82947 ASSAY GLUCOSE BLOOD QUANT: CPT

## 2020-10-08 RX ADMIN — INSULIN LISPRO 2 UNITS: 100 INJECTION, SOLUTION INTRAVENOUS; SUBCUTANEOUS at 12:35

## 2020-10-08 RX ADMIN — ATENOLOL 100 MG: 25 TABLET ORAL at 08:51

## 2020-10-08 RX ADMIN — CARBAMAZEPINE 100 MG: 100 TABLET, EXTENDED RELEASE ORAL at 06:02

## 2020-10-08 RX ADMIN — HEPARIN SODIUM 5000 UNITS: 5000 INJECTION INTRAVENOUS; SUBCUTANEOUS at 13:12

## 2020-10-08 RX ADMIN — RANOLAZINE 500 MG: 500 TABLET, FILM COATED, EXTENDED RELEASE ORAL at 08:51

## 2020-10-08 RX ADMIN — ATORVASTATIN CALCIUM 80 MG: 80 TABLET, FILM COATED ORAL at 08:51

## 2020-10-08 RX ADMIN — CLOPIDOGREL BISULFATE 75 MG: 75 TABLET ORAL at 08:51

## 2020-10-08 RX ADMIN — ISOSORBIDE MONONITRATE 15 MG: 30 TABLET, EXTENDED RELEASE ORAL at 08:51

## 2020-10-08 RX ADMIN — SODIUM CHLORIDE: 9 INJECTION, SOLUTION INTRAVENOUS at 10:24

## 2020-10-08 RX ADMIN — SODIUM CHLORIDE, PRESERVATIVE FREE 10 ML: 5 INJECTION INTRAVENOUS at 21:01

## 2020-10-08 RX ADMIN — INSULIN GLARGINE 50 UNITS: 100 INJECTION, SOLUTION SUBCUTANEOUS at 20:56

## 2020-10-08 RX ADMIN — AMLODIPINE BESYLATE 10 MG: 5 TABLET ORAL at 08:51

## 2020-10-08 RX ADMIN — CHOLESTYRAMINE 4 G: 4 POWDER, FOR SUSPENSION ORAL at 08:50

## 2020-10-08 RX ADMIN — HEPARIN SODIUM 5000 UNITS: 5000 INJECTION INTRAVENOUS; SUBCUTANEOUS at 06:02

## 2020-10-08 RX ADMIN — PANTOPRAZOLE SODIUM 40 MG: 40 TABLET, DELAYED RELEASE ORAL at 06:02

## 2020-10-08 RX ADMIN — CARBAMAZEPINE 300 MG: 100 TABLET, CHEWABLE ORAL at 17:42

## 2020-10-08 RX ADMIN — RANOLAZINE 500 MG: 500 TABLET, FILM COATED, EXTENDED RELEASE ORAL at 20:55

## 2020-10-08 RX ADMIN — SODIUM CHLORIDE: 9 INJECTION, SOLUTION INTRAVENOUS at 21:03

## 2020-10-08 RX ADMIN — HEPARIN SODIUM 5000 UNITS: 5000 INJECTION INTRAVENOUS; SUBCUTANEOUS at 20:55

## 2020-10-08 RX ADMIN — ASPIRIN 81 MG: 81 TABLET, COATED ORAL at 08:51

## 2020-10-08 ASSESSMENT — PAIN SCALES - GENERAL
PAINLEVEL_OUTOF10: 0
PAINLEVEL_OUTOF10: 0

## 2020-10-08 NOTE — PROGRESS NOTES
palpation, no organomegaly noted, rebound noted. extremities: no tenderness, no edema, moves all extremities  Psych: Affect normal and good eye contact, behavioral normal.      Labs/Imaging/Diagnostics    Labs:  CBC:  Recent Labs     10/07/20  0223 10/08/20  0133   WBC 9.1 6.2   RBC 4.48* 3.93*   HGB 14.5 12.7*   HCT 42.7 38.0*   MCV 95.3* 96.7*   RDW 12.7 12.4    139     CHEMISTRIES:  Recent Labs     10/07/20  0223 10/07/20  0838 10/07/20  1511 10/08/20  0133     --   --  143   K 5.5* 6.0* 4.8 4.5     --   --  111   CO2 22  --   --  22   BUN 56*  --   --  45*   CREATININE 2.7*  --   --  1.4*   GLUCOSE 181*  --   --  74     PT/INR:No results for input(s): PROTIME, INR in the last 72 hours. APTT:No results for input(s): APTT in the last 72 hours. LIVER PROFILE:  Recent Labs     10/07/20  0223   AST 14   ALT 19   BILITOT 0.3   ALKPHOS 91       Imaging Last 24 Hours:  Ct Abdomen Pelvis Wo Contrast Additional Contrast? None    Result Date: 10/7/2020  Examination. CT ABDOMEN PELVIS WO CONTRAST 10/7/2020 4:08 AM History: Right-sided abdominal pain nausea and diarrhea. DLP: 1099.62 mGycm. The CT scan of the abdomen and pelvis is performed without intravenous contrast enhancement. The images are acquired in axial plane with subsequent reconstruction in coronal and sagittal planes. No previous study for comparison. The lung bases included in the study show a partially visualized irregular shaped oval nodule in the right middle lobe laterally, image axial plane, measuring 7 mm in greatest dimension. The remaining lungs are unremarkable. The partially included cardiomediastinal structures show severe atheromatous changes of the coronary arteries. The unenhanced liver and spleen appear unremarkable. No radiopaque gallstones. The unenhanced pancreas appeared unremarkable. The adrenal glands are normal. There is lobulation of renal contour bilaterally.  There is a small high density nodule located medially in the left kidney measuring 1 cm in diameter. There are smaller low density nodules in both kidneys the largest one in the lower pole of the left kidney medially measuring 1.5 cm in diameter. The CT density suggest a cyst. The remaining nodules are too small to be further characterized. There is a 2 mm punctate calcification in the mid pole of the left kidney. No hydronephrosis on either side. The visualized ureters appear normal. The urinary bladder is poorly distended with moderate thickening of the walls. No obvious intrinsic abnormality. The prostate is borderline enlarged. The small fat-containing inguinal hernias are seen, left larger than right. A small fat-containing umbilical hernia is seen. The stomach duodenum and small bowel appear normal. Appendix is normal. Moderate gas, stool are seen in the colon. There is diverticulosis of the distal colon. No evidence for diverticulitis. Atheromatous changes of the abdominal aorta and iliac arteries are seen. No aneurysmal dilatation. No evidence of abdominal or pelvic lymphadenopathy. The images reviewed in bone window show no acute bony abnormality. Chronic degenerative changes of the lumbar and visualized thoracic spine is noted. No acute abnormality of the abdomen or pelvis. Appendix is normal. A 2 mm nonobstructing calculus in the mid left kidney. Bilateral renal cysts which are incompletely evaluated in this study. Sonography may be obtained for further follow-up. A 7 mm noncalcified nodule in the right lower lobe is incompletely evaluated. Further evaluation with CT scan of the chest may be obtained. The above study was initially reviewed and reported by stat rad[facility]. I do not find any discrepancies. Signed by Dr Daren Rubin on 10/7/2020 6:51 AM    Us Renal Complete    Result Date: 10/7/2020  EXAMINATION: US RENAL COMPLETE 10/7/2020 7:25 AM HISTORY: Abdominal pain, acute renal failure. Report: Sonographic images of the kidneys were obtained.  COMPARISON: Comparison is made with CT abdomen and pelvis without contrast 10/7/2020. The right kidney measures 12.5 x 5.6 x 4.9 cm and has normal cortical echogenicity, with a cortical thickness that ranges from 17.3 to 21.5 mm. There is a small 1.1 x 1.2 cm cyst at the interpolar level which appears benign. There is extensive second cyst at the inferior pole the right measuring 1.5 x 1.4 x 1.2 cm, which appears benign. No hydronephrosis or solid mass is identified. Color Doppler images demonstrate vascular flow within the right kidney. The left kidney measures 12.5 x 5.9 x 4.9 cm and has normal morphology and cortical echogenicity. No mass or hydronephrosis is identified. Left renal cortical thickness ranges from 16.2 to 16.9 mm. A small round hyperattenuating nodule or proteinaceous cyst seen on CT at the inferior pole the left kidney is not visualized. Color Doppler images demonstrate vascular flow within the left kidney. Images of the bladder show incomplete bladder distention with mild wall thickening. 1. 2 small cysts are identified in the right kidney, with a benign appearance. No solid renal mass is identified. A small subcentimeter hyperattenuating nodule or proteinaceous cyst seen at the inferior pole the left kidney on abdominal CT today is not visualized on ultrasound. Based on its CT appearance, this is likely benign, consider repeat CT of the abdomen and pelvis in 6 months. 2. No evidence of hydronephrosis. 3. Poor distention of the bladder with mild bladder wall thickening. Signed by Dr Ana Paula Stoll. Carmita on 10/7/2020 7:31 AM    Us Abdomen Limited Specify Organ? Liver, Gallbladder, Pancreas    Result Date: 10/7/2020  EXAM: US ABDOMEN LIMITED  -- 10/7/2020 5:38 AM HISTORY: 69 years, Male, abdominal pain COMPARISON:  10/7/2020 TECHNIQUE: Real-time ultrasound performed with representative images saved to PACS. FINDINGS: LIVER: Patent portal vein with normal hepatopedal flow. Normal liver echogenicity and contour. BILE DUCTS AND GALLBLADDER: Layering echogenicity in the gallbladder suggests sludge. No pericholecystic fluid or gallbladder wall thickening demonstrated. Common bile duct measures 0.4 cm, within normal limits. RIGHT KIDNEY: Measures 11.8 x 5.0 x 4.1 cm. No hydronephrosis demonstrated. PANCREAS: Not well-demonstrated IVC: Obscured AORTA: Obscured    1. Gallbladder sludge. No evidence of acute cholecystitis. Signed by Dr Noemy Denny on 10/7/2020 7:21 AM    Assessment//Plan           Hospital Problems           Last Modified POA    Acute kidney injury ([facility] [address] 10/7/2020 Yes        Assessment & Plan    TREY, prerenal with vomiting and diarrhea  Continue IV fluids  avoid nephrotoxic agents, hold home ACE-I and Aldactone  Renal ultrasound noted.     Hyperkalemia: Resolved  Status post sodium bicarb, Kayexalate, insulin and dextrose.     Right upper quadrant pain  No evidence of gallbladder or acute cholecystitis on ultrasound of the abdomen.     Vomiting and diarrhea in setting of enteritis: Resolved  -PRN antiemetics  -Supportive care for likely viral enteritis  - Replace volume losses with IV fluids     DM  -hold home PO meds  -poc glucose q6h  -sliding scale insulin as needed     Other chronic issues  HTN- continue appropriate home meds, hold ACE-I and aldactone  Cardiovascular disease - continue aspirin, plavix  GERD -PPI  Continue all other appropriate home meds for chronic conditions     DVT prophylaxis-Heparin      Electronically signed by   Jeronimo Kimble   Internal Medicine Hospitalist  On 10/8/2020  At 1:08 PM    EMR Dragon/Transcription disclaimer:   Much of this encounter note is an electronic transcription/translation of spoken language to printed text.  The electronic translation of spoken language may permit erroneous, or at times, nonsensical words or phrases to be inadvertently transcribed; although attempts have made to review the note for such errors, some may still exist.

## 2020-10-08 NOTE — PLAN OF CARE
Complications related to intravenous access or infusion will be avoided or minimized  Outcome: Ongoing

## 2020-10-09 VITALS
SYSTOLIC BLOOD PRESSURE: 142 MMHG | OXYGEN SATURATION: 95 % | WEIGHT: 216.9 LBS | HEIGHT: 72 IN | DIASTOLIC BLOOD PRESSURE: 69 MMHG | BODY MASS INDEX: 29.38 KG/M2 | TEMPERATURE: 97 F | HEART RATE: 57 BPM | RESPIRATION RATE: 18 BRPM

## 2020-10-09 LAB
ANION GAP SERPL CALCULATED.3IONS-SCNC: 9 MMOL/L (ref 7–19)
BUN BLDV-MCNC: 21 MG/DL (ref 8–23)
CALCIUM SERPL-MCNC: 9.1 MG/DL (ref 8.8–10.2)
CHLORIDE BLD-SCNC: 112 MMOL/L (ref 98–111)
CO2: 23 MMOL/L (ref 22–29)
CREAT SERPL-MCNC: 1 MG/DL (ref 0.5–1.2)
GFR AFRICAN AMERICAN: >59
GFR NON-AFRICAN AMERICAN: >60
GLUCOSE BLD-MCNC: 237 MG/DL (ref 70–99)
GLUCOSE BLD-MCNC: 83 MG/DL (ref 70–99)
GLUCOSE BLD-MCNC: 99 MG/DL (ref 74–109)
PERFORMED ON: ABNORMAL
PERFORMED ON: NORMAL
POTASSIUM REFLEX MAGNESIUM: 4.8 MMOL/L (ref 3.5–5)
SODIUM BLD-SCNC: 144 MMOL/L (ref 136–145)

## 2020-10-09 PROCEDURE — 36415 COLL VENOUS BLD VENIPUNCTURE: CPT

## 2020-10-09 PROCEDURE — 82947 ASSAY GLUCOSE BLOOD QUANT: CPT

## 2020-10-09 PROCEDURE — 6370000000 HC RX 637 (ALT 250 FOR IP): Performed by: STUDENT IN AN ORGANIZED HEALTH CARE EDUCATION/TRAINING PROGRAM

## 2020-10-09 PROCEDURE — 6360000002 HC RX W HCPCS: Performed by: STUDENT IN AN ORGANIZED HEALTH CARE EDUCATION/TRAINING PROGRAM

## 2020-10-09 PROCEDURE — 80048 BASIC METABOLIC PNL TOTAL CA: CPT

## 2020-10-09 RX ORDER — LISINOPRIL 40 MG/1
40 TABLET ORAL DAILY
Qty: 30 TABLET | Refills: 3 | Status: SHIPPED
Start: 2020-10-09

## 2020-10-09 RX ADMIN — AMLODIPINE BESYLATE 10 MG: 5 TABLET ORAL at 08:53

## 2020-10-09 RX ADMIN — CLOPIDOGREL BISULFATE 75 MG: 75 TABLET ORAL at 08:53

## 2020-10-09 RX ADMIN — CHOLESTYRAMINE 4 G: 4 POWDER, FOR SUSPENSION ORAL at 08:53

## 2020-10-09 RX ADMIN — ATENOLOL 100 MG: 25 TABLET ORAL at 08:52

## 2020-10-09 RX ADMIN — RANOLAZINE 500 MG: 500 TABLET, FILM COATED, EXTENDED RELEASE ORAL at 08:53

## 2020-10-09 RX ADMIN — CARBAMAZEPINE 100 MG: 100 TABLET, EXTENDED RELEASE ORAL at 06:13

## 2020-10-09 RX ADMIN — ISOSORBIDE MONONITRATE 15 MG: 30 TABLET, EXTENDED RELEASE ORAL at 08:53

## 2020-10-09 RX ADMIN — ASPIRIN 81 MG: 81 TABLET, COATED ORAL at 08:52

## 2020-10-09 RX ADMIN — PANTOPRAZOLE SODIUM 40 MG: 40 TABLET, DELAYED RELEASE ORAL at 06:13

## 2020-10-09 RX ADMIN — ATORVASTATIN CALCIUM 80 MG: 80 TABLET, FILM COATED ORAL at 08:53

## 2020-10-09 RX ADMIN — HEPARIN SODIUM 5000 UNITS: 5000 INJECTION INTRAVENOUS; SUBCUTANEOUS at 06:13

## 2020-10-09 RX ADMIN — INSULIN LISPRO 4 UNITS: 100 INJECTION, SOLUTION INTRAVENOUS; SUBCUTANEOUS at 13:09

## 2020-10-09 ASSESSMENT — PAIN SCALES - GENERAL: PAINLEVEL_OUTOF10: 0

## 2020-10-09 NOTE — DISCHARGE SUMMARY
Discharge Summary      Date:10/9/2020        Patient Mohsen Castrejon     YOB: 1950     Age:69 y.o. Admit Date:10/7/2020   Admission Condition:fair   Discharged Condition:stable  Discharge Date: 10/09/20       Discharge Diagnoses   Active Problems:    Acute kidney injury [facility])  Resolved Problems:    * No resolved hospital problems. [facility] Stay   Narrative of Hospital Course:     68 y. o. male with a past medical history of DM, hyper tension, obesity, GERD. Patient presented to [facility] ED due to days of nausea, vomiting and diarrhea. In the ED, work-up significant for potassium 5.5, BUN 56, creatinine 2.7 (chart review shows most recent creatinine of 0.8). CT abdomen/pelvis with findings throughout the bowel concerning for an enteritis. Patient renal function has improved with IV fluids and now close to his baseline.  On day of discharge, Denied any nausea, vomiting, diarrhea, chest pain or shortness of breath. Tolerating diet well with no recurrence of diarrhea. Some right upper quadrant intermittent abdominal pain for over a month, resolved abdomen with no acute abnormality, no evidence of gallstones or cholecystitis. Was encouraged to follow-up outpatient with PCP. Physical examination:  General: Well-developed, no acute distress lying comfortably in bed. HEENT: Atraumatic normocephalic, range of motion normal  Cardiac: Normal S1-S2 no murmurs rub or gallop. Respiratory: clear To auscultation bilaterally, no rhonchi or rales, no wheezing  Abdomen: Soft, positive bowel sounds in all quadrants, no distention, nontender to palpation, no organomegaly noted, rebound noted.     extremities: no tenderness, no edema, moves all extremities  Psych: Affect normal and good eye contact, behavioral normal.        Consultants:   None    Time Spent on Discharge:  >30 minutes were spent in patient examination, evaluation, counseling as well as medication reconciliation, prescriptions for required medications, discharge plan and follow up. Surgeries/Procedures Performed:  NONE      Significant Diagnostic Studies:   Recent Labs:  CBC:   Lab Results   Component Value Date    WBC 6.2 10/08/2020    RBC 3.93 10/08/2020    HGB 12.7 10/08/2020    HCT 38.0 10/08/2020    MCV 96.7 10/08/2020    MCH 32.3 10/08/2020    MCHC 33.4 10/08/2020    RDW 12.4 10/08/2020     10/08/2020     BMP:    Lab Results   Component Value Date    GLUCOSE 99 10/09/2020     10/09/2020    K 4.8 10/09/2020     10/09/2020    CO2 23 10/09/2020    ANIONGAP 9 10/09/2020    BUN 21 10/09/2020    CREATININE 1.0 10/09/2020    CALCIUM 9.1 10/09/2020    LABGLOM >60 10/09/2020    GFRAA >59 10/09/2020       Radiology Last 7 Days:  Ct Abdomen Pelvis Wo Contrast Additional Contrast? None    Result Date: 10/7/2020  No acute abnormality of the abdomen or pelvis. Appendix is normal. A 2 mm nonobstructing calculus in the mid left kidney. Bilateral renal cysts which are incompletely evaluated in this study. Sonography may be obtained for further follow-up. A 7 mm noncalcified nodule in the right lower lobe is incompletely evaluated. Further evaluation with CT scan of the chest may be obtained. The above study was initially reviewed and reported by stat rad[facility]. I do not find any discrepancies. Signed by Dr Delores Sampson on 10/7/2020 6:51 AM    Us Renal Complete    Result Date: 10/7/2020  1. 2 small cysts are identified in the right kidney, with a benign appearance. No solid renal mass is identified. A small subcentimeter hyperattenuating nodule or proteinaceous cyst seen at the inferior pole the left kidney on abdominal CT today is not visualized on ultrasound. Based on its CT appearance, this is likely benign, consider repeat CT of the abdomen and pelvis in 6 months. 2. No evidence of hydronephrosis. 3. Poor distention of the bladder with mild bladder wall thickening. Signed by Dr Zhen Garnett.  Carmita on 10/7/2020 7:31 AM    Us Abdomen Limited Specify Organ? Liver, Gallbladder, Pancreas    Result Date: 10/7/2020  1. Gallbladder sludge. No evidence of acute cholecystitis. Signed by Dr Terra Mckeon on 10/7/2020 7:21 AM      Discharge Plan   Disposition: Home    Provider Follow-Up:   Remington Tsang MD  [address]  [place] [id number]  [phone]    On 10/15/2020  Hospital follow up @ 1:15pm. This will be a telephone visit. Patient Instructions   Diet: cardiac diet and diabetic diet    Activity: activity as tolerated      Discharge Medications         Medication List      CHANGE how you take these medications    * carBAMazepine 100 MG extended release tablet  Commonly known as:  TEGRETOL XR  What changed:  Another medication with the same name was removed. Continue taking this medication, and follow the directions you see here. * carBAMazepine 100 MG chewable tablet  Commonly known as:  TEGRETOL  What changed:  Another medication with the same name was removed. Continue taking this medication, and follow the directions you see here. lisinopril 40 MG tablet  Commonly known as:  PRINIVIL;ZESTRIL  Take 1 tablet by mouth daily  What changed:  when to take this         * This list has 2 medication(s) that are the same as other medications prescribed for you. Read the directions carefully, and ask your doctor or other care provider to review them with you.             CONTINUE taking these medications    amLODIPine 10 MG tablet  Commonly known as:  NORVASC     aspirin 81 MG EC tablet     atenolol 100 MG tablet  Commonly known as:  TENORMIN     atorvastatin 80 MG tablet  Commonly known as:  LIPITOR     clopidogrel 75 MG tablet  Commonly known as:  PLAVIX     Colace 100 MG capsule  Generic drug:  docusate sodium     colestipol 1 g tablet  Commonly known as:  COLESTID     doxazosin 4 MG tablet  Commonly known as:  CARDURA     empagliflozin 10 MG tablet  Commonly known as:  JARDIANCE     Eye Vitamins Caps     fish oil 1000 MG Caps     insulin glargine 100 UNIT/ML injection vial  Commonly known as:  LANTUS     isosorbide mononitrate 30 MG extended release tablet  Commonly known as:  IMDUR     metFORMIN 500 MG tablet  Commonly known as:  GLUCOPHAGE     omeprazole 20 MG delayed release capsule  Commonly known as:  PRILOSEC     ranolazine 500 MG extended release tablet  Commonly known as:  RANEXA     SITagliptin 100 MG tablet  Commonly known as:  JANUVIA     spironolactone 25 MG tablet  Commonly known as:  ALDACTONE     vitamin D 1000 UNIT Tabs tablet  Commonly known as:  CHOLECALCIFEROL        STOP taking these medications    Effient 10 MG Tabs  Generic drug:  prasugrel     potassium chloride 10 MEQ extended release tablet  Commonly known as:  KLOR-CON M           Where to Get Your Medications      Information about where to get these medications is not yet available    Ask your nurse or doctor about these medications  · lisinopril 40 MG tablet         Electronically signed by Niki Diaz MD on 10/9/20 at 1:27 PM CDT

## 2020-10-09 NOTE — PLAN OF CARE
Problem: Pain:  Goal: Pain level will decrease  Description: Pain level will decrease  Outcome: Ongoing  Goal: Control of acute pain  Description: Control of acute pain  Outcome: Ongoing  Goal: Control of chronic pain  Description: Control of chronic pain  Outcome: Ongoing     Problem: Sensory:  Goal: Ability to identify factors that increase the pain will improve  Description: Ability to identify factors that increase the pain will improve  Outcome: Ongoing  Goal: Ability to demonstrate ways to enhance comfort will improve  Description: Ability to demonstrate ways to enhance comfort will improve  Outcome: Ongoing  Goal: General experience of comfort will improve  Description: General experience of comfort will improve  Outcome: Ongoing     Problem: Discharge Planning:  Goal: Discharged to appropriate level of care  Description: Discharged to appropriate level of care  Outcome: Ongoing     Problem: Nutritional:  Goal: Nutritional status will improve  Description: Nutritional status will improve  Outcome: Ongoing     Problem: Physical Regulation:  Goal: Diagnostic test results will improve  Description: Diagnostic test results will improve  Outcome: Ongoing  Goal: Will remain free from infection  Description: Will remain free from infection  Outcome: Ongoing  Goal: Ability to maintain vital signs within normal range will improve  Description: Ability to maintain vital signs within normal range will improve  Outcome: Ongoing     Problem: Respiratory:  Goal: Ability to maintain normal respiratory secretions will improve  Description: Ability to maintain normal respiratory secretions will improve  Outcome: Ongoing     Problem: Skin Integrity:  Goal: Demonstration of wound healing without infection will improve  Description: Demonstration of wound healing without infection will improve  Outcome: Ongoing  Goal: Complications related to intravenous access or infusion will be avoided or minimized  Description: Complications related to intravenous access or infusion will be avoided or minimized  Outcome: Ongoing

## 2020-10-15 ENCOUNTER — OFFICE VISIT (OUTPATIENT)
Dept: FAMILY MEDICINE CLINIC | Facility: CLINIC | Age: 70
End: 2020-10-15

## 2020-10-15 DIAGNOSIS — E86.0 DEHYDRATION: ICD-10-CM

## 2020-10-15 DIAGNOSIS — E11.65 TYPE 2 DIABETES MELLITUS WITH HYPERGLYCEMIA, WITH LONG-TERM CURRENT USE OF INSULIN (HCC): ICD-10-CM

## 2020-10-15 DIAGNOSIS — Z79.4 TYPE 2 DIABETES MELLITUS WITH HYPERGLYCEMIA, WITH LONG-TERM CURRENT USE OF INSULIN (HCC): ICD-10-CM

## 2020-10-15 DIAGNOSIS — R19.7 VOMITING AND DIARRHEA: Primary | ICD-10-CM

## 2020-10-15 DIAGNOSIS — R11.10 VOMITING AND DIARRHEA: Primary | ICD-10-CM

## 2020-10-15 PROCEDURE — 99442 PR PHYS/QHP TELEPHONE EVALUATION 11-20 MIN: CPT | Performed by: FAMILY MEDICINE

## 2020-10-15 RX ORDER — INSULIN GLARGINE 100 [IU]/ML
190 INJECTION, SOLUTION SUBCUTANEOUS DAILY
Qty: 60 ML | Refills: 5 | Status: SHIPPED | OUTPATIENT
Start: 2020-10-15 | End: 2020-12-01 | Stop reason: SDUPTHER

## 2020-10-15 NOTE — PROGRESS NOTES
Subjective   Beka Zavaleta is a 69 y.o. male.     Diarrhea   This is a new problem. The current episode started in the past 7 days. The problem has been resolved. Associated symptoms include abdominal pain and vomiting. Pertinent negatives include no fever. Nothing aggravates the symptoms. There are no known risk factors. He has tried increased fluids for the symptoms. The treatment provided significant relief.   Diabetes  He presents for his follow-up diabetic visit. He has type 2 diabetes mellitus. His disease course has been fluctuating. (Physical been on 200 units of insulin a day 58-year-old man is having recurrent hypoglycemic reactions but still continues to run high at times.)        The following portions of the patient's history were reviewed and updated as appropriate: allergies, current medications, past family history, past medical history, past social history, past surgical history and problem list.    Review of Systems   Constitutional: Negative for fever.   Gastrointestinal: Positive for abdominal pain, diarrhea and vomiting.       Objective   Physical Exam  Neurological:      Mental Status: He is alert and oriented to person, place, and time.         Assessment/Plan   Diagnoses and all orders for this visit:    1. Vomiting and diarrhea (Primary)    2. Type 2 diabetes mellitus with hyperglycemia, with long-term current use of insulin (CMS/[facility])  -     insulin glargine (Lantus) 100 UNIT/ML injection; Inject 190 Units under the skin into the appropriate area as directed Daily.  Dispense: 60 mL; Refill: 5    3. Dehydration    Patient comes in for hospital recheck he was admitted to [facility] for nausea vomiting diarrhea.  He had dehydration with an elevated creatinine of 2.7 but appears returned to 1.0 with IV hydration.  He had CAT scans and ultrasounds which were essentially unremarkable except for some gallbladder sludge.  His symptoms resolved and no other diagnosis other than  suspected viral gastritis noted.  He still has a little bit of crampiness stomach he thinks because he had been vomiting.  That is improving.  At this point no further work-up is indicated special senses reddening improved with IV hydration.  He is also having some hypoglycemia will decrease his insulin from 200 units a day down to 190 units today.  Discussed 3 small meals and 3 small diet diabetic diet also    I attest all information H&P and review of systems is accurate.\  You have chosen to receive care through a telephone visit. Do you consent to use a telephone visit for your medical care today? Yes  This visit has been rescheduled as a phone visit to comply with patient safety concerns in accordance with CDC recommendations. Total time of discussion was 12 minutes.

## 2020-11-04 ENCOUNTER — TELEPHONE (OUTPATIENT)
Dept: FAMILY MEDICINE CLINIC | Facility: CLINIC | Age: 70
End: 2020-11-04

## 2020-11-04 NOTE — TELEPHONE ENCOUNTER
PATIENTS PHARMACY CALLING IN REQUESTING TO SPEAK WITH A NURSE REGARDING A PRESCRIPTION CHANGE. ATTEMPTED TO TRANSFER TO THE OFFICE TWICE AND WAS HUNG UP ON.     NETTA OSORIO [facility] IS REQUESTING A CALL BACK.     CALL BACK NUMBER: [phone]

## 2020-11-04 NOTE — TELEPHONE ENCOUNTER
Spoke with [facility] pharmacy this morning and forwarded message to Dr. Hawkins regarding this patient and medication issue.  Waiting for response and decision from Dr. Hawkins.

## 2020-11-16 RX ORDER — SITAGLIPTIN 100 MG/1
TABLET, FILM COATED ORAL
Qty: 30 TABLET | Refills: 1 | Status: SHIPPED | OUTPATIENT
Start: 2020-11-16 | End: 2021-01-12 | Stop reason: SDUPTHER

## 2020-11-16 RX ORDER — LISINOPRIL 40 MG/1
40 TABLET ORAL 2 TIMES DAILY
Qty: 180 TABLET | Refills: 0 | Status: SHIPPED | OUTPATIENT
Start: 2020-11-16 | End: 2021-03-17

## 2020-11-16 RX ORDER — OMEPRAZOLE 40 MG/1
40 CAPSULE, DELAYED RELEASE ORAL DAILY
Qty: 90 CAPSULE | Refills: 0 | Status: SHIPPED | OUTPATIENT
Start: 2020-11-16 | End: 2021-12-07 | Stop reason: SDUPTHER

## 2020-11-16 NOTE — TELEPHONE ENCOUNTER
Caller: Talia Zavaleta    Relationship: Emergency Contact    Best call back number: [phone]    Medication needed:   Requested Prescriptions     Pending Prescriptions Disp Refills   • Januvia 100 MG tablet [Pharmacy Med Name: Januvia 100 MG Oral Tablet] 30 tablet 0     Sig: Take 1 tablet by mouth once daily   • lisinopril (PRINIVIL,ZESTRIL) 40 MG tablet 180 tablet 1     Sig: Take 1 tablet by mouth 2 (Two) Times a Day.   • omeprazole (priLOSEC) 40 MG capsule 90 capsule 3     Sig: Take 1 capsule by mouth Daily.       When do you need the refill by:   ASAP    What details did the patient provide when requesting the medication:   N/A    Does the patient have less than a 3 day supply:  [x] Yes  [] No    What is the patient's preferred pharmacy: [address] [facility] - [phone] [facility] [phone] FX

## 2020-11-30 RX ORDER — AMLODIPINE BESYLATE 10 MG/1
TABLET ORAL
Qty: 90 TABLET | Refills: 0 | Status: SHIPPED | OUTPATIENT
Start: 2020-11-30 | End: 2021-03-02 | Stop reason: SDUPTHER

## 2020-12-01 ENCOUNTER — OFFICE VISIT (OUTPATIENT)
Dept: FAMILY MEDICINE CLINIC | Facility: CLINIC | Age: 70
End: 2020-12-01

## 2020-12-01 VITALS — BODY MASS INDEX: 29.12 KG/M2 | WEIGHT: 215 LBS | HEIGHT: 72 IN

## 2020-12-01 DIAGNOSIS — I25.10 ATHEROSCLEROSIS OF NATIVE CORONARY ARTERY OF NATIVE HEART WITHOUT ANGINA PECTORIS: ICD-10-CM

## 2020-12-01 DIAGNOSIS — E78.00 HYPERCHOLESTEROLEMIA: ICD-10-CM

## 2020-12-01 DIAGNOSIS — Z79.4 TYPE 2 DIABETES MELLITUS WITH HYPERGLYCEMIA, WITH LONG-TERM CURRENT USE OF INSULIN (HCC): ICD-10-CM

## 2020-12-01 DIAGNOSIS — Z12.5 PROSTATE CANCER SCREENING: ICD-10-CM

## 2020-12-01 DIAGNOSIS — I10 ESSENTIAL HYPERTENSION: Primary | ICD-10-CM

## 2020-12-01 DIAGNOSIS — E11.65 TYPE 2 DIABETES MELLITUS WITH HYPERGLYCEMIA, WITH LONG-TERM CURRENT USE OF INSULIN (HCC): ICD-10-CM

## 2020-12-01 DIAGNOSIS — E03.9 ACQUIRED HYPOTHYROIDISM: ICD-10-CM

## 2020-12-01 DIAGNOSIS — K21.9 GASTROESOPHAGEAL REFLUX DISEASE WITHOUT ESOPHAGITIS: ICD-10-CM

## 2020-12-01 DIAGNOSIS — E53.8 VITAMIN B 12 DEFICIENCY: ICD-10-CM

## 2020-12-01 PROCEDURE — 99442 PR PHYS/QHP TELEPHONE EVALUATION 11-20 MIN: CPT | Performed by: FAMILY MEDICINE

## 2020-12-01 RX ORDER — INSULIN GLARGINE 100 [IU]/ML
200 INJECTION, SOLUTION SUBCUTANEOUS DAILY
Qty: 60 ML | Refills: 5 | Status: SHIPPED | OUTPATIENT
Start: 2020-12-01 | End: 2021-03-02 | Stop reason: SDUPTHER

## 2020-12-01 NOTE — PROGRESS NOTES
Subjective   Beka Zavaleta is a 69 y.o. male.     Hypertension  This is a chronic problem. The current episode started more than 1 year ago. The problem is unchanged. The problem is controlled. Associated symptoms include shortness of breath (olson). Pertinent negatives include no chest pain, orthopnea, palpitations, peripheral edema or PND.   Diabetes  He presents for his follow-up diabetic visit. He has type 2 diabetes mellitus. His disease course has been fluctuating. Pertinent negatives for diabetes include no chest pain and no foot paresthesias. His home blood glucose trend is fluctuating dramatically. His breakfast blood glucose range is generally 130-140 mg/dl. (180 - 333  ) An ACE inhibitor/angiotensin II receptor blocker is being taken.   Coronary Artery Disease  Presents for follow-up visit. Symptoms include shortness of breath (olson). Pertinent negatives include no chest pain, chest pressure, chest tightness, leg swelling, palpitations or weight gain. Risk factors include hyperlipidemia.   Heartburn  He complains of heartburn. He reports no chest pain or no dysphagia. This is a chronic problem. The current episode started more than 1 year ago. The problem occurs occasionally.   Hyperlipidemia  This is a chronic problem. The current episode started more than 1 year ago. The problem is controlled. Recent lipid tests were reviewed and are normal. Associated symptoms include myalgias and shortness of breath (olson). Pertinent negatives include no chest pain.        The following portions of the patient's history were reviewed and updated as appropriate: allergies, current medications, past family history, past medical history, past social history, past surgical history and problem list.    Review of Systems   Constitutional: Negative for weight gain.   Respiratory: Positive for shortness of breath (olson). Negative for chest tightness.    Cardiovascular: Negative for chest pain, palpitations, orthopnea, leg  swelling and PND.   Gastrointestinal: Positive for heartburn. Negative for dysphagia.   Musculoskeletal: Positive for myalgias.       Objective   Physical Exam   Constitutional: He is oriented to person, place, and time. No distress.   Neurological: He is alert and oriented to person, place, and time.   Psychiatric: His behavior is normal. Judgment and thought content normal.       Assessment/Plan   Problems Addressed this Visit        Cardiovascular and Mediastinum    Coronary atherosclerosis (Chronic)    Hypercholesterolemia    Relevant Orders    Lipid Panel    Essential hypertension - Primary    Relevant Orders    Comprehensive Metabolic Panel       Digestive    GERD (gastroesophageal reflux disease)    Relevant Orders    Vitamin B12    Magnesium    Vitamin B 12 deficiency       Endocrine    Type 2 diabetes mellitus (CMS/[facility])    Relevant Medications    insulin glargine (Lantus) 100 UNIT/ML injection    Other Relevant Orders    Hemoglobin A1c    Microalbumin / Creatinine Urine Ratio - Urine, Clean Catch      Other Visit Diagnoses     Acquired hypothyroidism        Relevant Orders    T4, Free    TSH    Prostate cancer screening        Relevant Orders    PSA Screen      Diagnoses       Codes Comments    Essential hypertension    -  Primary ICD-10-CM: I10  ICD-9-CM: 401.9     Type 2 diabetes mellitus with hyperglycemia, with long-term current use of insulin (CMS/[facility])     ICD-10-CM: E11.65, Z79.4  ICD-9-CM: 250.00, 790.29, V58.67     Gastroesophageal reflux disease without esophagitis     ICD-10-CM: K21.9  ICD-9-CM: 530.81     Hypercholesterolemia     ICD-10-CM: E78.00  ICD-9-CM: 272.0     Acquired hypothyroidism     ICD-10-CM: E03.9  ICD-9-CM: 244.9     Vitamin B 12 deficiency     ICD-10-CM: E53.8  ICD-9-CM: 266.2     Prostate cancer screening     ICD-10-CM: Z12.5  ICD-9-CM: V76.44     Atherosclerosis of native coronary artery of native heart without angina pectoris     ICD-10-CM: I25.10  ICD-9-CM: 414.01       You  "have chosen to receive care through a telephone visit. Do you consent to use a telephone visit for your medical care today? Yes  This visit has been rescheduled as a phone visit to comply with patient safety concerns in accordance with CDC recommendations. Total time of discussion was 19   minutes.    I attest that all information history review of systems is accurate.    For hypertension continue lisinopril, metoprolol, spironolactone, amlodipine and Lasix.  Also check CMP.    For GERD continue omeprazole..  We will also check a B12 and magnesium.        For atherosclerosis of the native coronary artery continue Brilinta, aspirin, and Plavix.    For hyperlipidemia continue atorvastatin, colestipol and check lipid profile.    For B12 deficiency continue B12 replacement and check B12 today.    4 hypothyroidism continue levothyroxine check free T4 and TSH today.    For diabetes continue Januvia, increase insulin from 190 to 200 units a day, and continue Metformin.  Also check hemoglobin A1c and urine for microalbuminuria.                Visit Vitals  Ht 182.9 cm (72\")   Wt 97.5 kg (215 lb)   BMI 29.16 kg/m²       Body mass index is 29.16 kg/m².            This document has been electronically signed by Fede Hawkins MD on December 1, 2020 09:19 CST      "

## 2020-12-23 RX ORDER — CARBAMAZEPINE 100 MG/1
TABLET, EXTENDED RELEASE ORAL
Qty: 300 TABLET | Refills: 0 | Status: SHIPPED | OUTPATIENT
Start: 2020-12-23 | End: 2021-03-02 | Stop reason: SDUPTHER

## 2021-01-12 ENCOUNTER — TELEPHONE (OUTPATIENT)
Dept: FAMILY MEDICINE CLINIC | Facility: CLINIC | Age: 71
End: 2021-01-12

## 2021-01-12 NOTE — TELEPHONE ENCOUNTER
Spouse called and said Beka needs refill sent to [facility] in [place] for [place] appt with Dr Stevenson scheduled for March

## 2021-02-17 ENCOUNTER — APPOINTMENT (OUTPATIENT)
Dept: VACCINE CLINIC | Facility: HOSPITAL | Age: 71
End: 2021-02-17

## 2021-02-22 ENCOUNTER — IMMUNIZATION (OUTPATIENT)
Dept: VACCINE CLINIC | Facility: HOSPITAL | Age: 71
End: 2021-02-22

## 2021-02-22 PROCEDURE — 91300 HC SARSCOV02 VAC 30MCG/0.3ML IM: CPT | Performed by: THORACIC SURGERY (CARDIOTHORACIC VASCULAR SURGERY)

## 2021-02-22 PROCEDURE — 0001A: CPT | Performed by: THORACIC SURGERY (CARDIOTHORACIC VASCULAR SURGERY)

## 2021-03-02 ENCOUNTER — LAB (OUTPATIENT)
Dept: LAB | Facility: HOSPITAL | Age: 71
End: 2021-03-02

## 2021-03-02 ENCOUNTER — OFFICE VISIT (OUTPATIENT)
Dept: FAMILY MEDICINE CLINIC | Facility: CLINIC | Age: 71
End: 2021-03-02

## 2021-03-02 VITALS
HEIGHT: 72 IN | OXYGEN SATURATION: 99 % | SYSTOLIC BLOOD PRESSURE: 148 MMHG | BODY MASS INDEX: 30.72 KG/M2 | HEART RATE: 75 BPM | DIASTOLIC BLOOD PRESSURE: 74 MMHG | WEIGHT: 226.8 LBS

## 2021-03-02 DIAGNOSIS — I10 ESSENTIAL HYPERTENSION: ICD-10-CM

## 2021-03-02 DIAGNOSIS — E11.65 TYPE 2 DIABETES MELLITUS WITH HYPERGLYCEMIA, WITH LONG-TERM CURRENT USE OF INSULIN (HCC): ICD-10-CM

## 2021-03-02 DIAGNOSIS — G50.0 TRIGEMINAL NEURALGIA: ICD-10-CM

## 2021-03-02 DIAGNOSIS — I25.10 ATHEROSCLEROSIS OF NATIVE CORONARY ARTERY OF NATIVE HEART WITHOUT ANGINA PECTORIS: ICD-10-CM

## 2021-03-02 DIAGNOSIS — E11.65 TYPE 2 DIABETES MELLITUS WITH HYPERGLYCEMIA, WITH LONG-TERM CURRENT USE OF INSULIN (HCC): Primary | ICD-10-CM

## 2021-03-02 DIAGNOSIS — Z79.4 TYPE 2 DIABETES MELLITUS WITH HYPERGLYCEMIA, WITH LONG-TERM CURRENT USE OF INSULIN (HCC): ICD-10-CM

## 2021-03-02 DIAGNOSIS — Z76.89 ENCOUNTER TO ESTABLISH CARE: ICD-10-CM

## 2021-03-02 DIAGNOSIS — Z79.4 TYPE 2 DIABETES MELLITUS WITH HYPERGLYCEMIA, WITH LONG-TERM CURRENT USE OF INSULIN (HCC): Primary | ICD-10-CM

## 2021-03-02 LAB
ALBUMIN SERPL-MCNC: 4.6 G/DL (ref 3.5–5.2)
ALBUMIN UR-MCNC: <1.2 MG/DL
ALBUMIN/GLOB SERPL: 1.4 G/DL
ALP SERPL-CCNC: 77 U/L (ref 39–117)
ALT SERPL W P-5'-P-CCNC: 23 U/L (ref 1–41)
ANION GAP SERPL CALCULATED.3IONS-SCNC: 11.6 MMOL/L (ref 5–15)
AST SERPL-CCNC: 22 U/L (ref 1–40)
BASOPHILS # BLD AUTO: 0.02 10*3/MM3 (ref 0–0.2)
BASOPHILS NFR BLD AUTO: 0.4 % (ref 0–1.5)
BILIRUB SERPL-MCNC: 0.3 MG/DL (ref 0–1.2)
BUN SERPL-MCNC: 17 MG/DL (ref 8–23)
BUN/CREAT SERPL: 21.8 (ref 7–25)
CALCIUM SPEC-SCNC: 10.1 MG/DL (ref 8.6–10.5)
CHLORIDE SERPL-SCNC: 105 MMOL/L (ref 98–107)
CHOLEST SERPL-MCNC: 146 MG/DL (ref 0–200)
CO2 SERPL-SCNC: 25.4 MMOL/L (ref 22–29)
CREAT SERPL-MCNC: 0.78 MG/DL (ref 0.76–1.27)
CREAT UR-MCNC: 48 MG/DL
DEPRECATED RDW RBC AUTO: 43.1 FL (ref 37–54)
EOSINOPHIL # BLD AUTO: 0.04 10*3/MM3 (ref 0–0.4)
EOSINOPHIL NFR BLD AUTO: 0.8 % (ref 0.3–6.2)
ERYTHROCYTE [DISTWIDTH] IN BLOOD BY AUTOMATED COUNT: 13 % (ref 12.3–15.4)
GFR SERPL CREATININE-BSD FRML MDRD: 98 ML/MIN/1.73
GLOBULIN UR ELPH-MCNC: 3.3 GM/DL
GLUCOSE SERPL-MCNC: 82 MG/DL (ref 65–99)
HBA1C MFR BLD: 8.61 % (ref 4.8–5.6)
HCT VFR BLD AUTO: 39 % (ref 37.5–51)
HDLC SERPL-MCNC: 39 MG/DL (ref 40–60)
HGB BLD-MCNC: 13.5 G/DL (ref 13–17.7)
IMM GRANULOCYTES # BLD AUTO: 0.03 10*3/MM3 (ref 0–0.05)
IMM GRANULOCYTES NFR BLD AUTO: 0.6 % (ref 0–0.5)
LDLC SERPL CALC-MCNC: 61 MG/DL (ref 0–100)
LDLC/HDLC SERPL: 1.26 {RATIO}
LYMPHOCYTES # BLD AUTO: 1.88 10*3/MM3 (ref 0.7–3.1)
LYMPHOCYTES NFR BLD AUTO: 35.6 % (ref 19.6–45.3)
MCH RBC QN AUTO: 32.1 PG (ref 26.6–33)
MCHC RBC AUTO-ENTMCNC: 34.6 G/DL (ref 31.5–35.7)
MCV RBC AUTO: 92.6 FL (ref 79–97)
MICROALBUMIN/CREAT UR: NORMAL MG/G{CREAT}
MONOCYTES # BLD AUTO: 0.49 10*3/MM3 (ref 0.1–0.9)
MONOCYTES NFR BLD AUTO: 9.3 % (ref 5–12)
NEUTROPHILS NFR BLD AUTO: 2.82 10*3/MM3 (ref 1.7–7)
NEUTROPHILS NFR BLD AUTO: 53.3 % (ref 42.7–76)
NRBC BLD AUTO-RTO: 0 /100 WBC (ref 0–0.2)
PLATELET # BLD AUTO: 138 10*3/MM3 (ref 140–450)
PMV BLD AUTO: 11.3 FL (ref 6–12)
POTASSIUM SERPL-SCNC: 4.5 MMOL/L (ref 3.5–5.2)
PROT SERPL-MCNC: 7.9 G/DL (ref 6–8.5)
RBC # BLD AUTO: 4.21 10*6/MM3 (ref 4.14–5.8)
SODIUM SERPL-SCNC: 142 MMOL/L (ref 136–145)
TRIGL SERPL-MCNC: 289 MG/DL (ref 0–150)
VLDLC SERPL-MCNC: 46 MG/DL (ref 5–40)
WBC # BLD AUTO: 5.28 10*3/MM3 (ref 3.4–10.8)

## 2021-03-02 PROCEDURE — 99214 OFFICE O/P EST MOD 30 MIN: CPT | Performed by: FAMILY MEDICINE

## 2021-03-02 PROCEDURE — 82570 ASSAY OF URINE CREATININE: CPT | Performed by: FAMILY MEDICINE

## 2021-03-02 PROCEDURE — 80053 COMPREHEN METABOLIC PANEL: CPT

## 2021-03-02 PROCEDURE — 80156 ASSAY CARBAMAZEPINE TOTAL: CPT

## 2021-03-02 PROCEDURE — 36415 COLL VENOUS BLD VENIPUNCTURE: CPT

## 2021-03-02 PROCEDURE — 80061 LIPID PANEL: CPT

## 2021-03-02 PROCEDURE — 80157 ASSAY CARBAMAZEPINE FREE: CPT

## 2021-03-02 PROCEDURE — 82043 UR ALBUMIN QUANTITATIVE: CPT | Performed by: FAMILY MEDICINE

## 2021-03-02 PROCEDURE — 83036 HEMOGLOBIN GLYCOSYLATED A1C: CPT

## 2021-03-02 PROCEDURE — 85025 COMPLETE CBC W/AUTO DIFF WBC: CPT

## 2021-03-02 RX ORDER — CARBAMAZEPINE 100 MG/1
TABLET, EXTENDED RELEASE ORAL
Qty: 300 TABLET | Refills: 0 | Status: SHIPPED | OUTPATIENT
Start: 2021-03-02 | End: 2021-04-06

## 2021-03-02 RX ORDER — INSULIN GLARGINE 100 [IU]/ML
200 INJECTION, SOLUTION SUBCUTANEOUS DAILY
Qty: 60 ML | Refills: 5 | Status: SHIPPED | OUTPATIENT
Start: 2021-03-02 | End: 2021-04-01 | Stop reason: SDUPTHER

## 2021-03-02 RX ORDER — AMLODIPINE BESYLATE 10 MG/1
10 TABLET ORAL DAILY
Qty: 90 TABLET | Refills: 3 | Status: SHIPPED | OUTPATIENT
Start: 2021-03-02

## 2021-03-02 NOTE — PROGRESS NOTES
"Chief Complaint  Establish Care, Hypertension, and Diabetes    Subjective          Beka Zavaleta presents to [facility]  History of Present Illness    Patient presents today to establish care.    Diabetes - Not well controlled.  She is taking Lantus 200 units in the morning, Januvia 100 mg daily and metformin 500 mg twice daily. He has tried jardiance and trulicity before, and notes that he had significant diarrhea with these medications - unable to tolerate.  Diet is \"not good\".    Diarrhea with jardiance and trulicity    Patient has hypertension and coronary artery disease.  Follows with cardiology in [place].  He is currently taking norvasc 10 mg daily, metoprolol  mg daily, imdur 30 mg daily, cardura 4 mg daily, sprinolactone 25 mg, aspirin 81 mg, plavix 75 mg, lasix 20 mg daily as needed and lipitor 80 mg daily.   Recently taken off lisinopril by cardiology.      Trigeminal neuralagia - Patient has this diagnosis.  Taking tegretol  mg, 4 tabs in the morning, 3 tabs at lunch and 3 tabs at bedtime.  Pain symptoms controlled with this medication.  Has been a while since lab was checked.        Objective   Vital Signs:   /74   Pulse 75   Ht 182.9 cm (72\")   Wt 103 kg (226 lb 12.8 oz)   SpO2 99%   BMI 30.76 kg/m²     Physical Exam  Vitals reviewed.   Constitutional:       General: He is not in acute distress.     Appearance: He is well-developed.   HENT:      Head: Normocephalic and atraumatic.   Cardiovascular:      Rate and Rhythm: Normal rate and regular rhythm.      Heart sounds: No murmur heard.     Pulmonary:      Effort: Pulmonary effort is normal. No respiratory distress.      Breath sounds: Normal breath sounds. No wheezing.   Abdominal:      Palpations: Abdomen is soft.      Tenderness: There is no abdominal tenderness.   Musculoskeletal:      Cervical back: Neck supple.   Skin:     General: Skin is warm and dry.      Findings: No rash. "   Neurological:      Mental Status: He is alert and oriented to person, place, and time.        Result Review :   The following data was reviewed by: Gisela Stevenson MD on 03/02/2021:    BMP and CBC from 10/08/2020 reviewed       Assessment and Plan    Diagnoses and all orders for this visit:    1. Type 2 diabetes mellitus with hyperglycemia, with long-term current use of insulin (CMS/[facility]) (Primary)  -     metFORMIN (GLUCOPHAGE) 500 MG tablet; Take 1 tablet by mouth 2 (Two) Times a Day With Meals.  Dispense: 180 tablet; Refill: 3  -     SITagliptin (Januvia) 100 MG tablet; Take 1 tablet by mouth Daily.  Dispense: 90 tablet; Refill: 1  -     insulin glargine (Lantus) 100 UNIT/ML injection; Inject 200 Units under the skin into the appropriate area as directed Daily.  Dispense: 60 mL; Refill: 5  -     Microalbumin / Creatinine Urine Ratio - Urine, Clean Catch  -     Hemoglobin A1c; Future    2. Trigeminal neuralgia  -     Carbamazepine Level, Free & Total; Future  -     carBAMazepine XR (TEGretol  XR) 100 MG 12 hr tablet; Take 4 tabs by mouth in the morning, 3 tabs at lunch and 3 tabs at bedtime  Dispense: 300 tablet; Refill: 0    3. Essential hypertension  -     amLODIPine (NORVASC) 10 MG tablet; Take 1 tablet by mouth Daily.  Dispense: 90 tablet; Refill: 3  -     Lipid Panel; Future  -     CBC & Differential; Future  -     Comprehensive Metabolic Panel; Future    4. Atherosclerosis of native coronary artery of native heart without angina pectoris    5. Encounter to establish care      Patient seen today to establish care.  Blood glucose levels at home improved.  Not well controlled, hyperglycemia present.  Last HgbA1c over a year ago was 8.3%.  Will recheck HgbA1c today.  Patient will keep blood glucose log and bring to the office in 2 weeks for review.  Medication adjustments may need to be made at that time.    Systolic blood pressure borderline today.  Will monitor.  Continue current antihypertensive  medications.    Trigeminal neuralgia symptoms controlled with tegretol XR.  Will check level today.  Continue medication at current dose.     Patient will continue following with cardiology for coronary artery disease.  Continue medical management and risk factor modification.          Follow Up   Return in about 4 weeks (around 3/30/2021) for Recheck.  Patient was given instructions and counseling regarding his condition or for health maintenance advice. Please see specific information pulled into the AVS if appropriate.         This document has been electronically signed by Gisela Stevenson MD

## 2021-03-03 ENCOUNTER — TELEPHONE (OUTPATIENT)
Dept: FAMILY MEDICINE CLINIC | Facility: CLINIC | Age: 71
End: 2021-03-03

## 2021-03-03 NOTE — TELEPHONE ENCOUNTER
Please contact patient and let him know the follow labs:     - Microalbumin/Cr ratio ok  - CMP is normal  - CBC shows slightly low platelets.  Looks like they have been low/borderline for a while. Will need continued monitoring.  - HgbA1c is 8.61%, which is above goal - Please remind patient to check blood glucose levels for the next 1-2 weeks and drop off paperwork at that time.    - Lipid panel is ok.      Carbamazepine level is still processing.  Will call with results once available.      Thanks, GRACIA Stevenson

## 2021-03-05 LAB
CARBAMAZEPINE FREE SERPL-MCNC: 2 UG/ML (ref 0.6–4.2)
CARBAMAZEPINE SERPL-MCNC: 8.1 UG/ML (ref 4–12)

## 2021-03-09 ENCOUNTER — TELEPHONE (OUTPATIENT)
Dept: FAMILY MEDICINE CLINIC | Facility: CLINIC | Age: 71
End: 2021-03-09

## 2021-03-09 NOTE — TELEPHONE ENCOUNTER
Per Dr. Stevenson Hollie Zavaleta has been called with recent lab results & recommendations.  Continue current medications and follow-up as planned or sooner if any problems.

## 2021-03-09 NOTE — TELEPHONE ENCOUNTER
Please call and let patient know that carbamazepine level is normal.  Continue current dose.  Will plan to monitor at least every 6 months.  ThanksGRACIA

## 2021-03-15 ENCOUNTER — IMMUNIZATION (OUTPATIENT)
Dept: VACCINE CLINIC | Facility: HOSPITAL | Age: 71
End: 2021-03-15

## 2021-03-15 PROCEDURE — 0002A: CPT | Performed by: THORACIC SURGERY (CARDIOTHORACIC VASCULAR SURGERY)

## 2021-03-15 PROCEDURE — 91300 HC SARSCOV02 VAC 30MCG/0.3ML IM: CPT | Performed by: THORACIC SURGERY (CARDIOTHORACIC VASCULAR SURGERY)

## 2021-03-23 ENCOUNTER — TELEPHONE (OUTPATIENT)
Dept: FAMILY MEDICINE CLINIC | Facility: CLINIC | Age: 71
End: 2021-03-23

## 2021-03-23 NOTE — TELEPHONE ENCOUNTER
Samia with [employer] [facility] called needing to speak with the nurse regarding directions for medications she is trying to help him get on an assistance program

## 2021-03-30 ENCOUNTER — OFFICE VISIT (OUTPATIENT)
Dept: FAMILY MEDICINE CLINIC | Facility: CLINIC | Age: 71
End: 2021-03-30

## 2021-03-30 VITALS
OXYGEN SATURATION: 98 % | SYSTOLIC BLOOD PRESSURE: 100 MMHG | BODY MASS INDEX: 30.68 KG/M2 | HEART RATE: 76 BPM | HEIGHT: 72 IN | DIASTOLIC BLOOD PRESSURE: 52 MMHG | WEIGHT: 226.5 LBS

## 2021-03-30 DIAGNOSIS — I10 ESSENTIAL HYPERTENSION: ICD-10-CM

## 2021-03-30 DIAGNOSIS — Z79.4 TYPE 2 DIABETES MELLITUS WITH HYPOGLYCEMIA WITHOUT COMA, WITH LONG-TERM CURRENT USE OF INSULIN (HCC): Primary | ICD-10-CM

## 2021-03-30 DIAGNOSIS — E11.649 TYPE 2 DIABETES MELLITUS WITH HYPOGLYCEMIA WITHOUT COMA, WITH LONG-TERM CURRENT USE OF INSULIN (HCC): Primary | ICD-10-CM

## 2021-03-30 PROCEDURE — 99214 OFFICE O/P EST MOD 30 MIN: CPT | Performed by: FAMILY MEDICINE

## 2021-03-30 RX ORDER — LISINOPRIL 40 MG/1
40 TABLET ORAL DAILY
COMMUNITY
End: 2021-04-12 | Stop reason: SDUPTHER

## 2021-03-31 ENCOUNTER — TELEPHONE (OUTPATIENT)
Dept: FAMILY MEDICINE CLINIC | Facility: CLINIC | Age: 71
End: 2021-03-31

## 2021-03-31 NOTE — TELEPHONE ENCOUNTER
Samia from [facility] [phone] says she would like for you to call her back about pt.

## 2021-04-01 RX ORDER — INSULIN GLARGINE 100 [IU]/ML
185 INJECTION, SOLUTION SUBCUTANEOUS DAILY
Qty: 200 ML | Refills: 3 | Status: SHIPPED | OUTPATIENT
Start: 2021-04-01 | End: 2022-01-26 | Stop reason: SDUPTHER

## 2021-04-06 DIAGNOSIS — G50.0 TRIGEMINAL NEURALGIA: ICD-10-CM

## 2021-04-06 RX ORDER — CARBAMAZEPINE 100 MG/1
TABLET, EXTENDED RELEASE ORAL
Qty: 300 TABLET | Refills: 0 | Status: SHIPPED | OUTPATIENT
Start: 2021-04-06 | End: 2021-05-04

## 2021-04-12 RX ORDER — LISINOPRIL 40 MG/1
40 TABLET ORAL DAILY
Qty: 90 TABLET | Refills: 3 | Status: SHIPPED | OUTPATIENT
Start: 2021-04-12 | End: 2021-05-27 | Stop reason: SDUPTHER

## 2021-04-13 ENCOUNTER — OFFICE VISIT (OUTPATIENT)
Dept: FAMILY MEDICINE CLINIC | Facility: CLINIC | Age: 71
End: 2021-04-13

## 2021-04-13 VITALS
BODY MASS INDEX: 30.95 KG/M2 | OXYGEN SATURATION: 99 % | DIASTOLIC BLOOD PRESSURE: 82 MMHG | HEART RATE: 74 BPM | SYSTOLIC BLOOD PRESSURE: 170 MMHG | WEIGHT: 228.5 LBS | HEIGHT: 72 IN

## 2021-04-13 DIAGNOSIS — Z79.4 TYPE 2 DIABETES MELLITUS WITHOUT COMPLICATION, WITH LONG-TERM CURRENT USE OF INSULIN (HCC): Primary | ICD-10-CM

## 2021-04-13 DIAGNOSIS — I10 ESSENTIAL HYPERTENSION: ICD-10-CM

## 2021-04-13 DIAGNOSIS — E11.9 TYPE 2 DIABETES MELLITUS WITHOUT COMPLICATION, WITH LONG-TERM CURRENT USE OF INSULIN (HCC): Primary | ICD-10-CM

## 2021-04-13 PROCEDURE — 99214 OFFICE O/P EST MOD 30 MIN: CPT | Performed by: FAMILY MEDICINE

## 2021-04-23 DIAGNOSIS — Z79.4 TYPE 2 DIABETES MELLITUS WITH HYPOGLYCEMIA WITHOUT COMA, WITH LONG-TERM CURRENT USE OF INSULIN (HCC): Primary | ICD-10-CM

## 2021-04-23 DIAGNOSIS — E11.649 TYPE 2 DIABETES MELLITUS WITH HYPOGLYCEMIA WITHOUT COMA, WITH LONG-TERM CURRENT USE OF INSULIN (HCC): Primary | ICD-10-CM

## 2021-04-23 NOTE — TELEPHONE ENCOUNTER
Pt needs refill of his test strips and lancets sent to [facility]. The pharmacy phone is [phone].

## 2021-04-28 DIAGNOSIS — E11.649 TYPE 2 DIABETES MELLITUS WITH HYPOGLYCEMIA WITHOUT COMA, WITH LONG-TERM CURRENT USE OF INSULIN (HCC): Primary | ICD-10-CM

## 2021-04-28 DIAGNOSIS — Z79.4 TYPE 2 DIABETES MELLITUS WITH HYPOGLYCEMIA WITHOUT COMA, WITH LONG-TERM CURRENT USE OF INSULIN (HCC): Primary | ICD-10-CM

## 2021-04-28 RX ORDER — BLOOD-GLUCOSE METER
EACH MISCELLANEOUS
Qty: 400 EACH | Refills: 3 | Status: SHIPPED | OUTPATIENT
Start: 2021-04-28 | End: 2021-05-03 | Stop reason: SDUPTHER

## 2021-04-28 NOTE — TELEPHONE ENCOUNTER
Please call in Rx for Embrace lancets for pt's new glucometer.    Uses [facility] for diabetic supplies   no

## 2021-04-30 ENCOUNTER — TELEPHONE (OUTPATIENT)
Dept: FAMILY MEDICINE CLINIC | Facility: CLINIC | Age: 71
End: 2021-04-30

## 2021-05-03 DIAGNOSIS — E11.649 TYPE 2 DIABETES MELLITUS WITH HYPOGLYCEMIA WITHOUT COMA, WITH LONG-TERM CURRENT USE OF INSULIN (HCC): ICD-10-CM

## 2021-05-03 DIAGNOSIS — Z79.4 TYPE 2 DIABETES MELLITUS WITH HYPOGLYCEMIA WITHOUT COMA, WITH LONG-TERM CURRENT USE OF INSULIN (HCC): ICD-10-CM

## 2021-05-03 RX ORDER — BLOOD-GLUCOSE METER
EACH MISCELLANEOUS
Qty: 400 EACH | Refills: 3 | Status: SHIPPED | OUTPATIENT
Start: 2021-05-03 | End: 2021-05-06 | Stop reason: SDUPTHER

## 2021-05-04 DIAGNOSIS — G50.0 TRIGEMINAL NEURALGIA: ICD-10-CM

## 2021-05-04 RX ORDER — CARBAMAZEPINE 100 MG/1
TABLET, EXTENDED RELEASE ORAL
Qty: 300 TABLET | Refills: 5 | Status: SHIPPED | OUTPATIENT
Start: 2021-05-04 | End: 2021-12-07 | Stop reason: SDUPTHER

## 2021-05-05 ENCOUNTER — TELEPHONE (OUTPATIENT)
Dept: FAMILY MEDICINE CLINIC | Facility: CLINIC | Age: 71
End: 2021-05-05

## 2021-05-05 DIAGNOSIS — Z79.4 TYPE 2 DIABETES MELLITUS WITH HYPOGLYCEMIA WITHOUT COMA, WITH LONG-TERM CURRENT USE OF INSULIN (HCC): ICD-10-CM

## 2021-05-05 DIAGNOSIS — E11.649 TYPE 2 DIABETES MELLITUS WITH HYPOGLYCEMIA WITHOUT COMA, WITH LONG-TERM CURRENT USE OF INSULIN (HCC): ICD-10-CM

## 2021-05-05 NOTE — TELEPHONE ENCOUNTER
Patients wife called needing a refill on his Embrace Lancets Ultra Thin 30G [[id number]] (Order [id number])    glucose blood test strip [[id number]] (Order [id number])    Pharmacy    [place], [place] - [address] - [phone]  - [phone]    [address] [address]   Phone:  [phone]  Fax:  [phone]     Pharmacy states they have not received medications yet.       Thank you

## 2021-05-06 ENCOUNTER — TELEPHONE (OUTPATIENT)
Dept: FAMILY MEDICINE CLINIC | Facility: CLINIC | Age: 71
End: 2021-05-06

## 2021-05-06 RX ORDER — BLOOD-GLUCOSE METER
EACH MISCELLANEOUS
Qty: 400 EACH | Refills: 3 | Status: SHIPPED | OUTPATIENT
Start: 2021-05-06 | End: 2021-05-07 | Stop reason: SDUPTHER

## 2021-05-07 DIAGNOSIS — Z79.4 TYPE 2 DIABETES MELLITUS WITH HYPOGLYCEMIA WITHOUT COMA, WITH LONG-TERM CURRENT USE OF INSULIN (HCC): ICD-10-CM

## 2021-05-07 DIAGNOSIS — E11.649 TYPE 2 DIABETES MELLITUS WITH HYPOGLYCEMIA WITHOUT COMA, WITH LONG-TERM CURRENT USE OF INSULIN (HCC): ICD-10-CM

## 2021-05-07 RX ORDER — BLOOD-GLUCOSE METER
EACH MISCELLANEOUS
Qty: 400 EACH | Refills: 3 | Status: SHIPPED | OUTPATIENT
Start: 2021-05-07 | End: 2021-05-17 | Stop reason: SDUPTHER

## 2021-05-11 ENCOUNTER — OFFICE VISIT (OUTPATIENT)
Dept: FAMILY MEDICINE CLINIC | Facility: CLINIC | Age: 71
End: 2021-05-11

## 2021-05-11 VITALS
DIASTOLIC BLOOD PRESSURE: 72 MMHG | OXYGEN SATURATION: 98 % | SYSTOLIC BLOOD PRESSURE: 150 MMHG | WEIGHT: 229 LBS | HEIGHT: 72 IN | BODY MASS INDEX: 31.02 KG/M2 | HEART RATE: 70 BPM

## 2021-05-11 DIAGNOSIS — E11.649 TYPE 2 DIABETES MELLITUS WITH HYPOGLYCEMIA WITHOUT COMA, WITH LONG-TERM CURRENT USE OF INSULIN (HCC): Primary | ICD-10-CM

## 2021-05-11 DIAGNOSIS — Z79.4 TYPE 2 DIABETES MELLITUS WITH HYPOGLYCEMIA WITHOUT COMA, WITH LONG-TERM CURRENT USE OF INSULIN (HCC): Primary | ICD-10-CM

## 2021-05-11 DIAGNOSIS — I10 ESSENTIAL HYPERTENSION: ICD-10-CM

## 2021-05-11 PROCEDURE — 99213 OFFICE O/P EST LOW 20 MIN: CPT | Performed by: FAMILY MEDICINE

## 2021-05-11 NOTE — PROGRESS NOTES
"Chief Complaint  Diabetes (1 month recheck)    Subjective          Beka Zavaleta presents to [facility]  History of Present Illness    Patient presents today for follow up diabetes.  Blood glucose has been doing ok.  He is taking Lantus 185 units nightly and Novolog 3 units with meals.  Can tell a difference since starting the short acting insulin.  Checking blood glucose levels 4 times daily.  Most morning blood glucose levels are 110s - 180s.  Most daytime blood glucose levels are 140s-190s.   Patient has had 2 low blood glucose levels during the day.  Says that these are likely days where he got busy fishing and forgot to eat.  Having some intermittent high glucose readings as well.  He has been feeling ok.      Does not check blood pressure regularly at home.  Taking metoprolol  mg daily (1/2 tab twice daily), amlodipine 10 mg daily, lisinopril 40 mg daily and doxazosin 4 mg daily.       Objective   Vital Signs:   /72   Pulse 70   Ht 182.9 cm (72\")   Wt 104 kg (229 lb)   SpO2 98%   BMI 31.06 kg/m²     Physical Exam  Vitals reviewed.   Constitutional:       General: He is not in acute distress.     Appearance: He is well-developed.   HENT:      Head: Normocephalic and atraumatic.   Cardiovascular:      Rate and Rhythm: Normal rate and regular rhythm.      Heart sounds: No murmur heard.     Pulmonary:      Effort: Pulmonary effort is normal. No respiratory distress.      Breath sounds: Normal breath sounds. No wheezing.   Abdominal:      Palpations: Abdomen is soft.      Tenderness: There is no abdominal tenderness.   Skin:     General: Skin is warm and dry.   Neurological:      Mental Status: He is alert and oriented to person, place, and time.        Result Review :   The following data was reviewed by: Gisela Stevenson MD on 05/11/2021:  Common labs    Common Labsle 3/2/21 3/2/21 3/2/21 3/2/21 3/2/21    1136 1136 1136 1136 1136   Glucose    82    BUN    17  "   Creatinine    0.78    eGFR Non African Am    98    Sodium    142    Potassium    4.5    Chloride    105    Calcium    10.1    Albumin    4.60    Total Bilirubin    0.3    Alkaline Phosphatase    77    AST (SGOT)    22    ALT (SGPT)    23    WBC 5.28       Hemoglobin 13.5       Hematocrit 39.0       Platelets 138 (A)       Total Cholesterol   146     Triglycerides   289 (A)     HDL Cholesterol   39 (A)     LDL Cholesterol    61     Hemoglobin A1C  8.61 (A)      Microalbumin, Urine     <1.2   (A) Abnormal value       Comments are available for some flowsheets but are not being displayed.                     Assessment and Plan    Diagnoses and all orders for this visit:    1. Type 2 diabetes mellitus with hypoglycemia without coma, with long-term current use of insulin (CMS/[facility]) (Primary)    2. Essential hypertension      Will continue with current insulin regimen  Patient will monitor blood glucose 4 times daily as he has been doing  Call with any hypoglycemia  Encouraged diabetic diet  Patient should try not to go long periods between eating    Blood pressure borderline  Continue current medications  Patient will check at least weekly until next visit            Follow Up   Return for Next scheduled follow up.  Patient was given instructions and counseling regarding his condition or for health maintenance advice. Please see specific information pulled into the AVS if appropriate.           This document has been electronically signed by Gisela Stevenson MD

## 2021-05-12 DIAGNOSIS — E11.649 TYPE 2 DIABETES MELLITUS WITH HYPOGLYCEMIA WITHOUT COMA, WITH LONG-TERM CURRENT USE OF INSULIN (HCC): ICD-10-CM

## 2021-05-12 DIAGNOSIS — Z79.4 TYPE 2 DIABETES MELLITUS WITH HYPOGLYCEMIA WITHOUT COMA, WITH LONG-TERM CURRENT USE OF INSULIN (HCC): ICD-10-CM

## 2021-05-13 ENCOUNTER — TELEPHONE (OUTPATIENT)
Dept: FAMILY MEDICINE CLINIC | Facility: CLINIC | Age: 71
End: 2021-05-13

## 2021-05-17 DIAGNOSIS — E11.649 TYPE 2 DIABETES MELLITUS WITH HYPOGLYCEMIA WITHOUT COMA, WITH LONG-TERM CURRENT USE OF INSULIN (HCC): ICD-10-CM

## 2021-05-17 DIAGNOSIS — Z79.4 TYPE 2 DIABETES MELLITUS WITH HYPOGLYCEMIA WITHOUT COMA, WITH LONG-TERM CURRENT USE OF INSULIN (HCC): ICD-10-CM

## 2021-05-17 RX ORDER — BLOOD-GLUCOSE METER
EACH MISCELLANEOUS
Qty: 400 EACH | Refills: 3 | Status: SHIPPED | OUTPATIENT
Start: 2021-05-17

## 2021-05-17 NOTE — TELEPHONE ENCOUNTER
Talia said she has been trying to get this taken care of for weeks now, she is needing Embrace test strips and Lancets faxed to mail order, has to be faxed not electronic to [facility]     Fax [phone]

## 2021-05-17 NOTE — TELEPHONE ENCOUNTER
Patient's wife returned your call.  She gave me another fax number for you to try and fax the Rx to.    [phone]    Hopefully this one will work.      Please call her and let her know.

## 2021-05-17 NOTE — TELEPHONE ENCOUNTER
I will call patient and let them know I have faxed this RX 3 times. I have tried to call and speak to someone at [facility]-Dr. Stevenson has also, we cannot speak to anyone or get anyone to return call.

## 2021-05-24 ENCOUNTER — TELEPHONE (OUTPATIENT)
Dept: FAMILY MEDICINE CLINIC | Facility: CLINIC | Age: 71
End: 2021-05-24

## 2021-05-24 NOTE — TELEPHONE ENCOUNTER
Patient wife called back in-relayed msg. They will be by sometime later this week to  insulin pens.

## 2021-05-24 NOTE — TELEPHONE ENCOUNTER
Called to let patient know pens are here-we received them last week but no name was attached to them.

## 2021-05-27 ENCOUNTER — TELEPHONE (OUTPATIENT)
Dept: FAMILY MEDICINE CLINIC | Facility: CLINIC | Age: 71
End: 2021-05-27

## 2021-05-27 RX ORDER — LISINOPRIL 40 MG/1
40 TABLET ORAL 2 TIMES DAILY
Qty: 180 TABLET | Refills: 3 | Status: SHIPPED | OUTPATIENT
Start: 2021-05-27 | End: 2021-06-27

## 2021-05-27 NOTE — TELEPHONE ENCOUNTER
Pt's wife called and said that Beka needs a refill of his Lisinopril (PRINIVIL,ZESTRIL) 40 MG tablet 90 day supply sent to [facility] in [place] and he takes it 2 times a day for years and the last time it was sent in as once a day.

## 2021-05-27 NOTE — TELEPHONE ENCOUNTER
Dr. Stevenson,   Mr. Zavaleta is questioning the decreased dosage of his Lisinopril 40 mg from 1 tablet BID to 1 tablet daily.    His med list indicates that it was changed on his reorder on 04/12/2021    Look like there may have been a change on 03/17/2021 as well.     Please Advise

## 2021-05-27 NOTE — TELEPHONE ENCOUNTER
I have sent in for lisinopril to be taken twice daily for now.  If he is able to wean down to once a day and keep his blood pressure normal, this is a safer dose for his kidneys.  It is not recommended to be above lisinopril 40 mg daily once over the age of 65.  Risk of kidney problems increases with age.  His last kidney function was normal.  Thanks, GRACIA Stevenson

## 2021-06-04 ENCOUNTER — LAB (OUTPATIENT)
Dept: LAB | Facility: HOSPITAL | Age: 71
End: 2021-06-04

## 2021-06-04 ENCOUNTER — OFFICE VISIT (OUTPATIENT)
Dept: FAMILY MEDICINE CLINIC | Facility: CLINIC | Age: 71
End: 2021-06-04

## 2021-06-04 ENCOUNTER — TELEPHONE (OUTPATIENT)
Dept: FAMILY MEDICINE CLINIC | Facility: CLINIC | Age: 71
End: 2021-06-04

## 2021-06-04 VITALS
BODY MASS INDEX: 30.2 KG/M2 | WEIGHT: 223 LBS | DIASTOLIC BLOOD PRESSURE: 70 MMHG | HEIGHT: 72 IN | SYSTOLIC BLOOD PRESSURE: 150 MMHG | HEART RATE: 68 BPM | OXYGEN SATURATION: 99 %

## 2021-06-04 DIAGNOSIS — I10 ESSENTIAL HYPERTENSION: ICD-10-CM

## 2021-06-04 DIAGNOSIS — E11.649 TYPE 2 DIABETES MELLITUS WITH HYPOGLYCEMIA WITHOUT COMA, WITH LONG-TERM CURRENT USE OF INSULIN (HCC): Primary | ICD-10-CM

## 2021-06-04 DIAGNOSIS — Z79.4 TYPE 2 DIABETES MELLITUS WITH HYPOGLYCEMIA WITHOUT COMA, WITH LONG-TERM CURRENT USE OF INSULIN (HCC): ICD-10-CM

## 2021-06-04 DIAGNOSIS — E11.649 TYPE 2 DIABETES MELLITUS WITH HYPOGLYCEMIA WITHOUT COMA, WITH LONG-TERM CURRENT USE OF INSULIN (HCC): ICD-10-CM

## 2021-06-04 DIAGNOSIS — D69.6 THROMBOCYTOPENIA (HCC): ICD-10-CM

## 2021-06-04 DIAGNOSIS — Z79.4 TYPE 2 DIABETES MELLITUS WITH HYPOGLYCEMIA WITHOUT COMA, WITH LONG-TERM CURRENT USE OF INSULIN (HCC): Primary | ICD-10-CM

## 2021-06-04 LAB
BASOPHILS # BLD AUTO: 0.02 10*3/MM3 (ref 0–0.2)
BASOPHILS NFR BLD AUTO: 0.3 % (ref 0–1.5)
DEPRECATED RDW RBC AUTO: 43.1 FL (ref 37–54)
EOSINOPHIL # BLD AUTO: 0.04 10*3/MM3 (ref 0–0.4)
EOSINOPHIL NFR BLD AUTO: 0.6 % (ref 0.3–6.2)
ERYTHROCYTE [DISTWIDTH] IN BLOOD BY AUTOMATED COUNT: 12.9 % (ref 12.3–15.4)
HBA1C MFR BLD: 7.65 % (ref 4.8–5.6)
HCT VFR BLD AUTO: 39.2 % (ref 37.5–51)
HGB BLD-MCNC: 13.5 G/DL (ref 13–17.7)
IMM GRANULOCYTES # BLD AUTO: 0.01 10*3/MM3 (ref 0–0.05)
IMM GRANULOCYTES NFR BLD AUTO: 0.2 % (ref 0–0.5)
LYMPHOCYTES # BLD AUTO: 2.62 10*3/MM3 (ref 0.7–3.1)
LYMPHOCYTES NFR BLD AUTO: 40.7 % (ref 19.6–45.3)
MCH RBC QN AUTO: 31.8 PG (ref 26.6–33)
MCHC RBC AUTO-ENTMCNC: 34.4 G/DL (ref 31.5–35.7)
MCV RBC AUTO: 92.2 FL (ref 79–97)
MONOCYTES # BLD AUTO: 0.61 10*3/MM3 (ref 0.1–0.9)
MONOCYTES NFR BLD AUTO: 9.5 % (ref 5–12)
NEUTROPHILS NFR BLD AUTO: 3.14 10*3/MM3 (ref 1.7–7)
NEUTROPHILS NFR BLD AUTO: 48.7 % (ref 42.7–76)
NRBC BLD AUTO-RTO: 0 /100 WBC (ref 0–0.2)
PLATELET # BLD AUTO: 137 10*3/MM3 (ref 140–450)
PMV BLD AUTO: 11.5 FL (ref 6–12)
RBC # BLD AUTO: 4.25 10*6/MM3 (ref 4.14–5.8)
WBC # BLD AUTO: 6.44 10*3/MM3 (ref 3.4–10.8)

## 2021-06-04 PROCEDURE — 83036 HEMOGLOBIN GLYCOSYLATED A1C: CPT

## 2021-06-04 PROCEDURE — 85025 COMPLETE CBC W/AUTO DIFF WBC: CPT

## 2021-06-04 PROCEDURE — 99214 OFFICE O/P EST MOD 30 MIN: CPT | Performed by: FAMILY MEDICINE

## 2021-06-04 PROCEDURE — 36415 COLL VENOUS BLD VENIPUNCTURE: CPT

## 2021-06-04 NOTE — TELEPHONE ENCOUNTER
Per Dr. Stevenson, Mr. Zavaleta's wife has been called with recent lab results and recommendations.  Continue current medications and follow-up as planned or sooner if any problems.

## 2021-06-04 NOTE — TELEPHONE ENCOUNTER
Please call and let patient know that platelets are stable.  HgbA1c is improved at 7.65%.  No medication changes at this time.    (Wife's labs are not back yet)    Thanks, GRACIA Stevenson

## 2021-06-04 NOTE — PROGRESS NOTES
"Chief Complaint  Diabetes    Subjective          Beka Zavaleta presents to [facility]  History of Present Illness    Patient presents today for follow up.  Blood pressure still having some elevations at home.  Taking amlodipine 10 mg daily, metoprolol  mg daily, lisinopril 40 mg daily and cardura 4 mg nightly.  Patient was previously taking lisinopril 40 mg twice daily until recently.  Patient was asked to go to lower dose to decrease risk of medication causing kidney problems.    Diabetes has been doing ok.  Patient is checking blood glucose 4 times daily.  He is taking Lantus 185 units daily and Novolog 3 units with meals.  Using Januvia 100 mg daily and metformin 500 mg twice daily.  No hypoglycemia.  Patient does worry about getting low with missing meals at times.    Objective   Vital Signs:   /70   Pulse 68   Ht 182.9 cm (72\")   Wt 101 kg (223 lb)   SpO2 99%   BMI 30.24 kg/m²     Physical Exam  Vitals reviewed.   Constitutional:       General: He is not in acute distress.     Appearance: He is well-developed.   HENT:      Head: Normocephalic and atraumatic.   Cardiovascular:      Rate and Rhythm: Normal rate and regular rhythm.      Heart sounds: No murmur heard.     Pulmonary:      Effort: Pulmonary effort is normal. No respiratory distress.      Breath sounds: Normal breath sounds. No wheezing.   Abdominal:      Palpations: Abdomen is soft.      Tenderness: There is no abdominal tenderness.   Skin:     General: Skin is warm and dry.      Findings: No rash.   Neurological:      Mental Status: He is alert and oriented to person, place, and time.        Result Review :   The following data was reviewed by: Gisela Stevenson MD on 06/04/2021:  Common labs    Common Labsle 3/2/21 3/2/21 3/2/21 3/2/21 3/2/21    1136 1136 1136 1136 1136   Glucose    82    BUN    17    Creatinine    0.78    eGFR Non African Am    98    Sodium    142    Potassium    4.5    Chloride   "  105    Calcium    10.1    Albumin    4.60    Total Bilirubin    0.3    Alkaline Phosphatase    77    AST (SGOT)    22    ALT (SGPT)    23    WBC 5.28       Hemoglobin 13.5       Hematocrit 39.0       Platelets 138 (A)       Total Cholesterol   146     Triglycerides   289 (A)     HDL Cholesterol   39 (A)     LDL Cholesterol    61     Hemoglobin A1C  8.61 (A)      Microalbumin, Urine     <1.2   Comments are available for some flowsheets but are not being displayed.                     Assessment and Plan    Diagnoses and all orders for this visit:    1. Type 2 diabetes mellitus with hypoglycemia without coma, with long-term current use of insulin (CMS/[facility]) (Primary)  -     Hemoglobin A1c; Future  -     CBC & Differential; Future    2. Essential hypertension    3. Thrombocytopenia (CMS/[facility])  -     CBC & Differential; Future      Patient seen today for follow up  Needs frequent monitoring as medication adjustments have been made  Patient is currently taking high dose of basal insulin, concern for hypoglycemia with this long term  Will continue to transition to more even long acting/rapid acting doses  Continue 3 units as standard for meals now, but may need additional units with larger/higher carb meals  Check HgbA1c today    Blood pressure borderline  Continue current medications  Increase lisinopril to 60 mg daily (1.5 tabs daily)  Encouraged low salt diet    Recheck CBC for concerns of borderline thrombocytopenia  New finding on labs, first seen starting 10/2020.  Will continue to monitor for now          Follow Up   Return in about 3 months (around 9/4/2021) for Recheck.  Patient was given instructions and counseling regarding his condition or for health maintenance advice. Please see specific information pulled into the AVS if appropriate.         This document has been electronically signed by Gisela Stevenson MD

## 2021-06-14 ENCOUNTER — TELEPHONE (OUTPATIENT)
Dept: FAMILY MEDICINE CLINIC | Facility: CLINIC | Age: 71
End: 2021-06-14

## 2021-06-14 NOTE — TELEPHONE ENCOUNTER
Talia called and said Beka needs an rx sent to  [facility] for a new Glucameter.  Their phone number [phone] and Customer # [id number]

## 2021-06-21 ENCOUNTER — TELEPHONE (OUTPATIENT)
Dept: FAMILY MEDICINE CLINIC | Facility: CLINIC | Age: 71
End: 2021-06-21

## 2021-06-21 NOTE — TELEPHONE ENCOUNTER
Talia called and said that [facility] called them and Beka is wanting a glucometer that you don't use a phone with and you don't have to prick your finger.  I asked her what kind/name and she did not know.  She said they have tried to contact us numerous times, which I told her they have not, and that Elsa tried to call them back and got transferred around and hung up on.  I told her we have to know which device to order and Lauren will try them again. She gave me a different phone number and fax     Ph# [phone]  Fax [phone]

## 2021-06-27 RX ORDER — LISINOPRIL 40 MG/1
60 TABLET ORAL DAILY
Qty: 180 TABLET | Refills: 3
Start: 2021-06-27 | End: 2022-07-13 | Stop reason: SDUPTHER

## 2021-07-30 ENCOUNTER — TELEPHONE (OUTPATIENT)
Dept: FAMILY MEDICINE CLINIC | Facility: CLINIC | Age: 71
End: 2021-07-30

## 2021-07-30 NOTE — TELEPHONE ENCOUNTER
Pt needs refill of his Metoprolol  MG 90 day supply sent to [facility] in [place].

## 2021-08-03 ENCOUNTER — TELEPHONE (OUTPATIENT)
Dept: FAMILY MEDICINE CLINIC | Facility: CLINIC | Age: 71
End: 2021-08-03

## 2021-08-03 RX ORDER — METOPROLOL SUCCINATE 100 MG/1
100 TABLET, EXTENDED RELEASE ORAL DAILY
Qty: 90 TABLET | Refills: 3 | Status: SHIPPED | OUTPATIENT
Start: 2021-08-03 | End: 2022-07-13 | Stop reason: SDUPTHER

## 2021-08-03 NOTE — TELEPHONE ENCOUNTER
The wrong type of medicine was called in for Metoprolol to pharmacy, it was sprinkles. He needs  Metoprolol 100 mg tablet sent to [facility] in [place]

## 2021-08-19 ENCOUNTER — TELEPHONE (OUTPATIENT)
Dept: FAMILY MEDICINE CLINIC | Facility: CLINIC | Age: 71
End: 2021-08-19

## 2021-08-19 NOTE — TELEPHONE ENCOUNTER
Spoke to Talia and let her know insulin is not in office. She was confused wasn't sure if it was suppose to come here or their house. I told her if it comes to the office she will be contacted.

## 2021-09-07 ENCOUNTER — OFFICE VISIT (OUTPATIENT)
Dept: FAMILY MEDICINE CLINIC | Facility: CLINIC | Age: 71
End: 2021-09-07

## 2021-09-07 ENCOUNTER — TELEPHONE (OUTPATIENT)
Dept: FAMILY MEDICINE CLINIC | Facility: CLINIC | Age: 71
End: 2021-09-07

## 2021-09-07 ENCOUNTER — LAB (OUTPATIENT)
Dept: LAB | Facility: HOSPITAL | Age: 71
End: 2021-09-07

## 2021-09-07 VITALS
BODY MASS INDEX: 30.88 KG/M2 | HEART RATE: 70 BPM | SYSTOLIC BLOOD PRESSURE: 126 MMHG | WEIGHT: 228 LBS | OXYGEN SATURATION: 96 % | HEIGHT: 72 IN | DIASTOLIC BLOOD PRESSURE: 60 MMHG

## 2021-09-07 DIAGNOSIS — R19.7 DIARRHEA, UNSPECIFIED TYPE: ICD-10-CM

## 2021-09-07 DIAGNOSIS — Z79.4 TYPE 2 DIABETES MELLITUS WITH HYPOGLYCEMIA WITHOUT COMA, WITH LONG-TERM CURRENT USE OF INSULIN (HCC): Primary | ICD-10-CM

## 2021-09-07 DIAGNOSIS — E11.649 TYPE 2 DIABETES MELLITUS WITH HYPOGLYCEMIA WITHOUT COMA, WITH LONG-TERM CURRENT USE OF INSULIN (HCC): Primary | ICD-10-CM

## 2021-09-07 DIAGNOSIS — I10 ESSENTIAL HYPERTENSION: ICD-10-CM

## 2021-09-07 DIAGNOSIS — Z79.4 TYPE 2 DIABETES MELLITUS WITH HYPOGLYCEMIA WITHOUT COMA, WITH LONG-TERM CURRENT USE OF INSULIN (HCC): ICD-10-CM

## 2021-09-07 DIAGNOSIS — E11.649 TYPE 2 DIABETES MELLITUS WITH HYPOGLYCEMIA WITHOUT COMA, WITH LONG-TERM CURRENT USE OF INSULIN (HCC): ICD-10-CM

## 2021-09-07 LAB — HBA1C MFR BLD: 7.7 % (ref 4.8–5.6)

## 2021-09-07 PROCEDURE — 99213 OFFICE O/P EST LOW 20 MIN: CPT | Performed by: FAMILY MEDICINE

## 2021-09-07 PROCEDURE — 36415 COLL VENOUS BLD VENIPUNCTURE: CPT

## 2021-09-07 PROCEDURE — 83036 HEMOGLOBIN GLYCOSYLATED A1C: CPT

## 2021-09-07 RX ORDER — MONTELUKAST SODIUM 4 MG/1
2 TABLET, CHEWABLE ORAL 2 TIMES DAILY
Qty: 360 TABLET | Refills: 3 | Status: SHIPPED | OUTPATIENT
Start: 2021-09-07 | End: 2022-03-17

## 2021-09-07 NOTE — PROGRESS NOTES
"Chief Complaint  Diabetes    Subjective          Bkea Zavaleta presents to [facility] [place]  History of Present Illness    Patient presents today for follow-up hypertension diabetes.  Blood pressures been doing okay.  Patient is taking amlodipine 10 mg daily, metoprolol  mg daily, doxazosin 4 mg nightly and lisinopril 60 mg daily.  For diabetes, patient is using Januvia 100 mg daily, metformin 500 mg twice daily, Lantus 185 units daily and NovoLog 3 units with meals.  Patient is tolerating frequency of blood glucose checks, monitoring 4 times daily.  No significant hypoglycemia episodes.    Objective   Vital Signs:   /60   Pulse 70   Ht 182.9 cm (72\")   Wt 103 kg (228 lb)   SpO2 96%   BMI 30.92 kg/m²     Physical Exam  Vitals reviewed.   Constitutional:       General: He is not in acute distress.     Appearance: He is well-developed.   Cardiovascular:      Rate and Rhythm: Normal rate and regular rhythm.      Heart sounds: No murmur heard.     Pulmonary:      Effort: Pulmonary effort is normal. No respiratory distress.      Breath sounds: Normal breath sounds. No wheezing.   Abdominal:      Palpations: Abdomen is soft.      Tenderness: There is no abdominal tenderness.   Skin:     General: Skin is warm and dry.      Findings: No rash.   Neurological:      Mental Status: He is alert and oriented to person, place, and time.        Result Review :   The following data was reviewed by: Gisela Stevenson MD on 09/07/2021:  Common labs    Common Labsle 2/26/21 3/2/21 3/2/21 3/2/21 3/2/21 3/2/21 6/4/21 6/4/21     1136 1136 1136 1136 1136 0851 0851   Glucose     82      BUN     17      Creatinine     0.78      eGFR Non African Am     98      Sodium     142      Potassium 4.9    4.5      Chloride     105      Calcium     10.1      Albumin     4.60      Total Bilirubin     0.3      Alkaline Phosphatase     77      AST (SGOT)     22      ALT (SGPT)     23    "   WBC  5.28     6.44    Hemoglobin  13.5     13.5    Hematocrit  39.0     39.2    Platelets  138 (A)     137 (A)    Total Cholesterol    146       Triglycerides    289 (A)       HDL Cholesterol    39 (A)       LDL Cholesterol     61       Hemoglobin A1C   8.61 (A)     7.65 (A)   Microalbumin, Urine      <1.2     (A) Abnormal value       Comments are available for some flowsheets but are not being displayed.                     Assessment and Plan    Diagnoses and all orders for this visit:    1. Type 2 diabetes mellitus with hypoglycemia without coma, with long-term current use of insulin (CMS/[facility]) (Primary)  -     Hemoglobin A1c; Future    2. Essential hypertension    3. Diarrhea, unspecified type  -     colestipol (COLESTID) 1 g tablet; Take 2 tablets by mouth 2 (Two) Times a Day.  Dispense: 360 tablet; Refill: 3      Patient seen today for follow up  Patient is currently taking high dose of basal insulin, concern for hypoglycemia with this long term  Will continue to transition to more even long acting/rapid acting doses  Decrease Lantus to 180 units  Increase to Novolog 5 units as standard for meals now, but may need additional units with larger/higher carb meals (sliding scale)  Check HgbA1c today  Patient will bring glucose log to the office for review    Blood pressure controlled  Continue current medication    Needed refills provided today            Follow Up   Return in about 3 months (around 12/7/2021) for Recheck.  Patient was given instructions and counseling regarding his condition or for health maintenance advice. Please see specific information pulled into the AVS if appropriate.         This document has been electronically signed by Gisela Stevenson MD

## 2021-09-08 NOTE — TELEPHONE ENCOUNTER
Please call and let patient know that HgbA1c is 7.7%, same as last check.  Continue with plan for increasing mealtime insulin as discussed at office visit.  ThanksGRACIA

## 2021-09-08 NOTE — TELEPHONE ENCOUNTER
Per Dr. Elva Md. Waqar has been called with recent lab results and recommendations.  Continue current medications and follow-up as planned or sooner if any problems.

## 2021-10-28 NOTE — TELEPHONE ENCOUNTER
Pr Dr. Hawkins's instruction  Labs look Good, Liver Function Studies are Stable.   Continue his current medications and follow-up as recommended or sooner if any problems      ----- Message from Fede Hawkins MD sent at 3/31/2017  1:09 PM CDT -----  OK, liver stable     VTE

## 2021-11-05 RX ORDER — CLOPIDOGREL BISULFATE 75 MG/1
75 TABLET ORAL DAILY
Qty: 90 TABLET | Refills: 3 | Status: SHIPPED | OUTPATIENT
Start: 2021-11-05 | End: 2022-11-07

## 2021-11-05 RX ORDER — ATORVASTATIN CALCIUM 80 MG/1
80 TABLET, FILM COATED ORAL DAILY
Qty: 90 TABLET | Refills: 3 | Status: SHIPPED | OUTPATIENT
Start: 2021-11-05 | End: 2022-11-07

## 2021-11-05 NOTE — TELEPHONE ENCOUNTER
Incoming Refill Request      Medication requested (name and dose): Atorvastatin 80mg  Clopidogrel 75mg    Pharmacy where request should be sent: [facility]     Additional details provided by patient:     Best call back number: [phone]    Does the patient have less than a 3 day supply:  [] Yes  [x] No    Ciarra Redman Rep  11/05/21, 08:13 CDT

## 2021-11-08 RX ORDER — DOXAZOSIN MESYLATE 4 MG/1
4 TABLET ORAL NIGHTLY
Qty: 90 TABLET | Refills: 3 | Status: SHIPPED | OUTPATIENT
Start: 2021-11-08 | End: 2022-08-09

## 2021-11-08 NOTE — TELEPHONE ENCOUNTER
Incoming Refill Request      Medication requested (name and dose): Doxazosin    Pharmacy where request should be sent: [facility] in [place]     Additional details provided by patient:     Best call back number: [phone]    Does the patient have less than a 3 day supply:  [x] Yes  [] No    Ciarra Redman Rep  11/08/21, 08:37 CST

## 2021-11-18 ENCOUNTER — TELEPHONE (OUTPATIENT)
Dept: FAMILY MEDICINE CLINIC | Facility: CLINIC | Age: 71
End: 2021-11-18

## 2021-11-18 NOTE — TELEPHONE ENCOUNTER
Called and spoke to Samia. She is going to be sending paperwork for us to sign and fax back to her for patient to get diabetic medications.

## 2021-11-18 NOTE — TELEPHONE ENCOUNTER
AMY FROM [facility] IN [place] CALLED WANTING TO DISCUSS THE PATIENTS HEALTHCARE    THANKS

## 2021-12-07 ENCOUNTER — TELEPHONE (OUTPATIENT)
Dept: FAMILY MEDICINE CLINIC | Facility: CLINIC | Age: 71
End: 2021-12-07

## 2021-12-07 ENCOUNTER — OFFICE VISIT (OUTPATIENT)
Dept: FAMILY MEDICINE CLINIC | Facility: CLINIC | Age: 71
End: 2021-12-07

## 2021-12-07 VITALS
BODY MASS INDEX: 31.02 KG/M2 | WEIGHT: 229 LBS | SYSTOLIC BLOOD PRESSURE: 146 MMHG | HEIGHT: 72 IN | DIASTOLIC BLOOD PRESSURE: 72 MMHG | OXYGEN SATURATION: 96 % | HEART RATE: 65 BPM

## 2021-12-07 DIAGNOSIS — E11.649 TYPE 2 DIABETES MELLITUS WITH HYPOGLYCEMIA WITHOUT COMA, WITH LONG-TERM CURRENT USE OF INSULIN: Primary | ICD-10-CM

## 2021-12-07 DIAGNOSIS — K21.9 GASTROESOPHAGEAL REFLUX DISEASE WITHOUT ESOPHAGITIS: ICD-10-CM

## 2021-12-07 DIAGNOSIS — Z79.4 TYPE 2 DIABETES MELLITUS WITH HYPOGLYCEMIA WITHOUT COMA, WITH LONG-TERM CURRENT USE OF INSULIN: Primary | ICD-10-CM

## 2021-12-07 DIAGNOSIS — G50.0 TRIGEMINAL NEURALGIA: ICD-10-CM

## 2021-12-07 PROCEDURE — 99213 OFFICE O/P EST LOW 20 MIN: CPT | Performed by: FAMILY MEDICINE

## 2021-12-07 RX ORDER — CARBAMAZEPINE 100 MG/1
TABLET, EXTENDED RELEASE ORAL
Qty: 300 TABLET | Refills: 5 | Status: CANCELLED | OUTPATIENT
Start: 2021-12-07

## 2021-12-07 RX ORDER — OMEPRAZOLE 40 MG/1
40 CAPSULE, DELAYED RELEASE ORAL DAILY
Qty: 90 CAPSULE | Refills: 3 | Status: SHIPPED | OUTPATIENT
Start: 2021-12-07 | End: 2022-11-16 | Stop reason: SDUPTHER

## 2021-12-07 RX ORDER — CARBAMAZEPINE 100 MG/1
TABLET, EXTENDED RELEASE ORAL
Qty: 300 TABLET | Refills: 5 | Status: SHIPPED | OUTPATIENT
Start: 2021-12-07 | End: 2022-03-14 | Stop reason: SDUPTHER

## 2021-12-07 NOTE — TELEPHONE ENCOUNTER
Samia from [facility] called and said she did not get the fax that was sent last week on Beka, his medications.     Fax [phone]  Ph [phone]

## 2021-12-27 ENCOUNTER — TELEPHONE (OUTPATIENT)
Dept: FAMILY MEDICINE CLINIC | Facility: CLINIC | Age: 71
End: 2021-12-27

## 2021-12-27 DIAGNOSIS — Z79.4 TYPE 2 DIABETES MELLITUS WITH HYPOGLYCEMIA WITHOUT COMA, WITH LONG-TERM CURRENT USE OF INSULIN (HCC): ICD-10-CM

## 2021-12-27 DIAGNOSIS — E11.649 TYPE 2 DIABETES MELLITUS WITH HYPOGLYCEMIA WITHOUT COMA, WITH LONG-TERM CURRENT USE OF INSULIN (HCC): ICD-10-CM

## 2021-12-27 NOTE — TELEPHONE ENCOUNTER
Please find out current insulin regimen.  Ask if he is eating any snack at bedtime or middle of the night?  Thanks, GRACIA Stevenson

## 2021-12-27 NOTE — TELEPHONE ENCOUNTER
FYI     *12/7/21 am 202  Lunch 215  Supper 245  *12/8/21 am 200  Lunch 155  Supper 197  *12/9/21 am 234  Supper 150  *12/10/21  Am 232  Lunch 213  Supper 103  *12/11/21 279  Supper 187  *12/12/21  Am 206  Lunch 204  Supper 111  *12/13/21 218 am  132 lunch   233 supper  *12/14/21 218 am   171 lunch   227 supper  *12/15/21 350 am   Supper 196  *12/16/21 237  Supper 214  *12/17/21 246  165 lunch  182 supper   *12/18/21 254 am  Supper 215  *12/19/21 253am   216 lunch   282 supper   *12/20/21 295 am   437 supper   *12/21/21 218am   199 lunch  154 supper   *12/22/21 248 am  175 lunch   251 supper   *12/23/21 318 am   267 lunch   380 supper   *12/24/21 231am  272 lunch  *12/25/21 218 am   239 lunch   302 supper   *12/26/21 237 am   211 lunch   162 supper   *12/27/21 200am

## 2021-12-27 NOTE — TELEPHONE ENCOUNTER
His wife state he is taking Lantus 170 units in the AM and Novolog Sliding Scale TID anywhere from 2-8 units depending on Blood Sugar.     Sometime he eats a snack at bedtime but does not get up in the middle of the night and eat

## 2021-12-27 NOTE — TELEPHONE ENCOUNTER
Per Dr Stevenson, MsHollie Jeffrey has been called with new sliding scale recommendations.   New script sent  With up dated information

## 2021-12-27 NOTE — TELEPHONE ENCOUNTER
Let's adjust his sliding scale from 4-12 units.  Please make sure he is using his sliding scale with breakfast and only using when he is going to eat a meal.     150-200: 4 units  200-250: 6 units  250-300: 8 units  300-350: 10 units  Over 350: 12 units    GRACIA Bocanegra

## 2022-01-03 ENCOUNTER — TELEPHONE (OUTPATIENT)
Dept: FAMILY MEDICINE CLINIC | Facility: CLINIC | Age: 72
End: 2022-01-03

## 2022-01-03 NOTE — TELEPHONE ENCOUNTER
Pt  called back with a number for you to call about his novalog pen. It is [phone] Her name is Samia Rubi

## 2022-01-03 NOTE — TELEPHONE ENCOUNTER
Elsa,     Have we gotten his insulin yet?  Please communicate with wife.      Thanks, GRACIA Stevenson

## 2022-01-03 NOTE — TELEPHONE ENCOUNTER
MRS CONN CALLED THE  AND SHE SAID IT WAS DELIVERED TO US ON 12/09 AND SIGNED FOR BY SOMEONE WITH THE LAST NAME KAVON. SHE STATES HE DOESN'T HAVE ENOUGH TO LAST THE REST OF TODAY

## 2022-01-03 NOTE — TELEPHONE ENCOUNTER
MRS. CONN CALLED TO CHECK ON THE STATUS OF THE INSULIN PEN THAT WAS SUPPOSED TO HAVE BEEN SENT HERE. SHE STATES HE ALMOST OUT.

## 2022-01-10 ENCOUNTER — LAB (OUTPATIENT)
Dept: LAB | Facility: OTHER | Age: 72
End: 2022-01-10

## 2022-01-10 PROCEDURE — 87635 SARS-COV-2 COVID-19 AMP PRB: CPT | Performed by: PHYSICIAN ASSISTANT

## 2022-01-24 ENCOUNTER — TELEPHONE (OUTPATIENT)
Dept: FAMILY MEDICINE CLINIC | Facility: CLINIC | Age: 72
End: 2022-01-24

## 2022-01-24 NOTE — TELEPHONE ENCOUNTER
Patients wife called to check on insulin that was sent to our office, she received a letter last week stating it was sent here. She said he is almost out.     Please Advise    Thanks

## 2022-01-26 DIAGNOSIS — E11.649 TYPE 2 DIABETES MELLITUS WITH HYPOGLYCEMIA WITHOUT COMA, WITH LONG-TERM CURRENT USE OF INSULIN: ICD-10-CM

## 2022-01-26 DIAGNOSIS — Z79.4 TYPE 2 DIABETES MELLITUS WITH HYPOGLYCEMIA WITHOUT COMA, WITH LONG-TERM CURRENT USE OF INSULIN: ICD-10-CM

## 2022-01-26 RX ORDER — INSULIN GLARGINE 100 [IU]/ML
185 INJECTION, SOLUTION SUBCUTANEOUS DAILY
Qty: 200 ML | Refills: 3 | Status: SHIPPED | OUTPATIENT
Start: 2022-01-26 | End: 2022-01-26 | Stop reason: SDUPTHER

## 2022-01-26 RX ORDER — INSULIN GLARGINE 100 [IU]/ML
185 INJECTION, SOLUTION SUBCUTANEOUS DAILY
Qty: 200 ML | Refills: 3 | Status: SHIPPED | OUTPATIENT
Start: 2022-01-26 | End: 2022-02-14 | Stop reason: SDUPTHER

## 2022-01-26 NOTE — TELEPHONE ENCOUNTER
Belem said that insulin was never received last week. I haven't had any deliveries this week. I have tried to contact Samia to see when it was sent out, but unable to reach her. Candelaria @  has relayed this message to patient wife.

## 2022-01-26 NOTE — TELEPHONE ENCOUNTER
Incoming Refill Request      Medication requested (name and dose): [facility]     Pharmacy where request should be sent: [facility]    Additional details provided by patient: PT WOULD LIKE IT TODAY    Best call back number:     Does the patient have less than a 3 day supply:  [] Yes  [] No    Ciarra Juarez Rep  01/26/22, 11:51 CST

## 2022-02-14 ENCOUNTER — TELEPHONE (OUTPATIENT)
Dept: FAMILY MEDICINE CLINIC | Facility: CLINIC | Age: 72
End: 2022-02-14

## 2022-02-14 DIAGNOSIS — E11.649 TYPE 2 DIABETES MELLITUS WITH HYPOGLYCEMIA WITHOUT COMA, WITH LONG-TERM CURRENT USE OF INSULIN: ICD-10-CM

## 2022-02-14 DIAGNOSIS — Z79.4 TYPE 2 DIABETES MELLITUS WITH HYPOGLYCEMIA WITHOUT COMA, WITH LONG-TERM CURRENT USE OF INSULIN: ICD-10-CM

## 2022-02-14 RX ORDER — INSULIN GLARGINE 100 [IU]/ML
185 INJECTION, SOLUTION SUBCUTANEOUS DAILY
Qty: 200 ML | Refills: 3 | Status: SHIPPED | OUTPATIENT
Start: 2022-02-14

## 2022-02-14 NOTE — TELEPHONE ENCOUNTER
Incoming Refill Request      Medication requested (name and dose):[facility]    Pharmacy where request should be sent: [facility]    Additional details provided by patient: non    Best call back number: [phone] or [phone]    Does the patient have less than a 3 day supply:  [] Yes  [x] No    Monica Pearson  02/14/22, 08:26 CST

## 2022-02-14 NOTE — TELEPHONE ENCOUNTER
Patient's wife called and said that she talked to Martita about his Novolog and it shows that it was shipped on 2-4-22. Call his wife Talia at [phone].

## 2022-02-23 ENCOUNTER — TELEPHONE (OUTPATIENT)
Dept: FAMILY MEDICINE CLINIC | Facility: CLINIC | Age: 72
End: 2022-02-23

## 2022-02-23 NOTE — TELEPHONE ENCOUNTER
On 02/14/2022 they were told it was shipped on 02/04/2022 and told the wife it could take 10-21 days.     I have called the only number that she has to contact the lady about his Insulin shipment Samia at [phone].  Ms. Zavaleta could not tell me what company she is with.   I have left a message to pedro me back.     She states he has one pen left I told her that we would have to call in a script to the Pharmacy if they do not receive it by the time he runs out.     I do not think we have samples of insulin but will check.

## 2022-02-23 NOTE — TELEPHONE ENCOUNTER
Just an FYI:    I talked to Samia and she said that his order is still in processing for shipment.   She said it could be the middle of March.   The order was not released until 02/14/2022.    She had originally been told it was released on 02/04/2022    It could still take 10-21 days for the shipment to ship    Ms. Zavaleta has been called.   She wanted to know if we have any samples for the Novolog Flex Pen?  I told her I would check and see, if we don't a script was sent to [facility] in [place]  12/27/2021 for 3 pens and 3 refills

## 2022-03-08 ENCOUNTER — TELEPHONE (OUTPATIENT)
Dept: FAMILY MEDICINE CLINIC | Facility: CLINIC | Age: 72
End: 2022-03-08

## 2022-03-08 ENCOUNTER — LAB (OUTPATIENT)
Dept: LAB | Facility: HOSPITAL | Age: 72
End: 2022-03-08

## 2022-03-08 ENCOUNTER — OFFICE VISIT (OUTPATIENT)
Dept: FAMILY MEDICINE CLINIC | Facility: CLINIC | Age: 72
End: 2022-03-08

## 2022-03-08 VITALS
RESPIRATION RATE: 18 BRPM | WEIGHT: 229 LBS | DIASTOLIC BLOOD PRESSURE: 70 MMHG | BODY MASS INDEX: 31.02 KG/M2 | HEART RATE: 69 BPM | OXYGEN SATURATION: 96 % | SYSTOLIC BLOOD PRESSURE: 128 MMHG | HEIGHT: 72 IN

## 2022-03-08 DIAGNOSIS — G50.0 TRIGEMINAL NEURALGIA: ICD-10-CM

## 2022-03-08 DIAGNOSIS — I10 ESSENTIAL HYPERTENSION: ICD-10-CM

## 2022-03-08 DIAGNOSIS — Z79.4 TYPE 2 DIABETES MELLITUS WITH HYPERGLYCEMIA, WITH LONG-TERM CURRENT USE OF INSULIN: Primary | ICD-10-CM

## 2022-03-08 DIAGNOSIS — Z79.4 TYPE 2 DIABETES MELLITUS WITH HYPERGLYCEMIA, WITH LONG-TERM CURRENT USE OF INSULIN: ICD-10-CM

## 2022-03-08 DIAGNOSIS — E11.65 TYPE 2 DIABETES MELLITUS WITH HYPERGLYCEMIA, WITH LONG-TERM CURRENT USE OF INSULIN: Primary | ICD-10-CM

## 2022-03-08 DIAGNOSIS — E11.65 TYPE 2 DIABETES MELLITUS WITH HYPERGLYCEMIA, WITH LONG-TERM CURRENT USE OF INSULIN: ICD-10-CM

## 2022-03-08 LAB
ALBUMIN SERPL-MCNC: 4.8 G/DL (ref 3.5–5.2)
ALBUMIN/GLOB SERPL: 1.8 G/DL
ALP SERPL-CCNC: 80 U/L (ref 39–117)
ALT SERPL W P-5'-P-CCNC: 24 U/L (ref 1–41)
ANION GAP SERPL CALCULATED.3IONS-SCNC: 11.7 MMOL/L (ref 5–15)
AST SERPL-CCNC: 17 U/L (ref 1–40)
BASOPHILS # BLD AUTO: 0.02 10*3/MM3 (ref 0–0.2)
BASOPHILS NFR BLD AUTO: 0.4 % (ref 0–1.5)
BILIRUB SERPL-MCNC: 0.3 MG/DL (ref 0–1.2)
BUN SERPL-MCNC: 16 MG/DL (ref 8–23)
BUN/CREAT SERPL: 16.2 (ref 7–25)
CALCIUM SPEC-SCNC: 10.2 MG/DL (ref 8.6–10.5)
CHLORIDE SERPL-SCNC: 102 MMOL/L (ref 98–107)
CHOLEST SERPL-MCNC: 160 MG/DL (ref 0–200)
CO2 SERPL-SCNC: 26.3 MMOL/L (ref 22–29)
CREAT SERPL-MCNC: 0.99 MG/DL (ref 0.76–1.27)
DEPRECATED RDW RBC AUTO: 46.3 FL (ref 37–54)
EGFRCR SERPLBLD CKD-EPI 2021: 80.4 ML/MIN/1.73
EOSINOPHIL # BLD AUTO: 0.04 10*3/MM3 (ref 0–0.4)
EOSINOPHIL NFR BLD AUTO: 0.7 % (ref 0.3–6.2)
ERYTHROCYTE [DISTWIDTH] IN BLOOD BY AUTOMATED COUNT: 13.5 % (ref 12.3–15.4)
GLOBULIN UR ELPH-MCNC: 2.7 GM/DL
GLUCOSE SERPL-MCNC: 182 MG/DL (ref 65–99)
HBA1C MFR BLD: 9.1 % (ref 4.8–5.6)
HCT VFR BLD AUTO: 40.3 % (ref 37.5–51)
HDLC SERPL-MCNC: 41 MG/DL (ref 40–60)
HGB BLD-MCNC: 13.8 G/DL (ref 13–17.7)
IMM GRANULOCYTES # BLD AUTO: 0.02 10*3/MM3 (ref 0–0.05)
IMM GRANULOCYTES NFR BLD AUTO: 0.4 % (ref 0–0.5)
LDLC SERPL CALC-MCNC: 77 MG/DL (ref 0–100)
LDLC/HDLC SERPL: 1.66 {RATIO}
LYMPHOCYTES # BLD AUTO: 1.73 10*3/MM3 (ref 0.7–3.1)
LYMPHOCYTES NFR BLD AUTO: 31.7 % (ref 19.6–45.3)
MCH RBC QN AUTO: 31.8 PG (ref 26.6–33)
MCHC RBC AUTO-ENTMCNC: 34.2 G/DL (ref 31.5–35.7)
MCV RBC AUTO: 92.9 FL (ref 79–97)
MONOCYTES # BLD AUTO: 0.53 10*3/MM3 (ref 0.1–0.9)
MONOCYTES NFR BLD AUTO: 9.7 % (ref 5–12)
NEUTROPHILS NFR BLD AUTO: 3.11 10*3/MM3 (ref 1.7–7)
NEUTROPHILS NFR BLD AUTO: 57.1 % (ref 42.7–76)
NRBC BLD AUTO-RTO: 0.2 /100 WBC (ref 0–0.2)
PLATELET # BLD AUTO: 120 10*3/MM3 (ref 140–450)
PMV BLD AUTO: 11.4 FL (ref 6–12)
POTASSIUM SERPL-SCNC: 5.1 MMOL/L (ref 3.5–5.2)
PROT SERPL-MCNC: 7.5 G/DL (ref 6–8.5)
RBC # BLD AUTO: 4.34 10*6/MM3 (ref 4.14–5.8)
SODIUM SERPL-SCNC: 140 MMOL/L (ref 136–145)
TRIGL SERPL-MCNC: 254 MG/DL (ref 0–150)
VLDLC SERPL-MCNC: 42 MG/DL (ref 5–40)
WBC NRBC COR # BLD: 5.45 10*3/MM3 (ref 3.4–10.8)

## 2022-03-08 PROCEDURE — 80156 ASSAY CARBAMAZEPINE TOTAL: CPT

## 2022-03-08 PROCEDURE — 99214 OFFICE O/P EST MOD 30 MIN: CPT | Performed by: FAMILY MEDICINE

## 2022-03-08 PROCEDURE — 80157 ASSAY CARBAMAZEPINE FREE: CPT

## 2022-03-08 PROCEDURE — 85025 COMPLETE CBC W/AUTO DIFF WBC: CPT

## 2022-03-08 PROCEDURE — 83036 HEMOGLOBIN GLYCOSYLATED A1C: CPT

## 2022-03-08 PROCEDURE — 80061 LIPID PANEL: CPT

## 2022-03-08 PROCEDURE — 80053 COMPREHEN METABOLIC PANEL: CPT

## 2022-03-08 PROCEDURE — 36415 COLL VENOUS BLD VENIPUNCTURE: CPT

## 2022-03-08 RX ORDER — FLASH GLUCOSE SCANNING READER
1 EACH MISCELLANEOUS ONCE
Qty: 1 EACH | Refills: 0 | Status: SHIPPED | OUTPATIENT
Start: 2022-03-08 | End: 2022-03-08

## 2022-03-08 RX ORDER — FLASH GLUCOSE SENSOR
1 KIT MISCELLANEOUS
Qty: 6 EACH | Refills: 1 | Status: SHIPPED | OUTPATIENT
Start: 2022-03-08

## 2022-03-08 NOTE — TELEPHONE ENCOUNTER
I called Samia to check on pt's Novalog pen injectors. Samia stated that there was a shipment lost in December, when she called on 02/23/2022 she was told the order was processed and should ship out in 10-14 business days. Samia expects that we should have the new order by the end of this week, she is going to call and try to get a tracking number on latest shipment. Pt and Dr Stevenson are aware of conversation. Called Samia at [phone]. Samia is going to call me back with update and tracking number.

## 2022-03-08 NOTE — TELEPHONE ENCOUNTER
AMY RETURNED YOUR CALL REGARDING [employer] CON[employer]      SHE SAYS IT IS SUPPOSED TO BE DELIVERED TODAY AND SHE IS CALLING THE PT TO INFORM. SHE SAID YOU WOULD KNOW WHAT SHE IS TALKING ABOUT

## 2022-03-08 NOTE — TELEPHONE ENCOUNTER
Belem,   Do you know anything about his Insulin?  Did it come in today?     He is 's patient  Can you or Samia please let them know.    Thank you  JAMIL Lanza

## 2022-03-08 NOTE — TELEPHONE ENCOUNTER
I have called Mr. Zavaleta and told them that I had just received his Insulin, its to late for him to come and get it today so he will come in tomorrow 2022 to get it.     I have marked it with his name BRISEYDA and Dr. Stevenson's name on it and I gave it to her.     She has placed it in the fridge in the Medication Room.   I have let the girls out front know that he will be coming in in the morning to pick it up.     JAMIL Lanza

## 2022-03-08 NOTE — PROGRESS NOTES
"Chief Complaint  Follow-up, Hypertension, and Diabetes    Subjective          Beka Zavaleta presents to [facility] [place]  History of Present Illness    Patient seen today for follow-up hypertension and diabetes.  Blood pressure has been doing okay.  Patient is currently taking amlodipine 10 mg daily, lisinopril 60, Cardura 4 mg at night metoprolol  mg daily.  No problems with blood pressure medications.    Patient continues to struggle with management of his diabetes.  Currently taking Lantus 170 units daily, NovoLog on sliding scale 4 to 12 units, Januvia 100 mg daily and Metformin 500 mg twice daily with meals.  Has appointment scheduled to see endocrinology for consultation on 3/18/2022.  Patient is checking his blood glucose levels about 3 times a day.  Breakfast readings are 157 - 303, lunch readings are 155-304 and dinner readings are 103 -304.  Patient has been hesitant to drop his basal insulin.  Says that decreasing Lantus from 185 units daily to 170 units daily has made his glucose worse.  Patient does not follow a diabetic diet very stringently.     Taking carbamazepine for almost 20 years at current dose for trigeminal neuralgia.  Received notice from insurance that there is a 120 tablet quantity limit.  Patient has been skipping nighttime dose for concerns of running out of medication.    Objective   Vital Signs:   /70   Pulse 69   Resp 18   Ht 182.9 cm (72\")   Wt 104 kg (229 lb)   SpO2 96%   BMI 31.06 kg/m²     Physical Exam  Vitals reviewed.   Constitutional:       General: He is not in acute distress.     Appearance: He is well-developed.   Cardiovascular:      Rate and Rhythm: Normal rate and regular rhythm.      Heart sounds: No murmur heard.  Pulmonary:      Effort: Pulmonary effort is normal. No respiratory distress.      Breath sounds: Normal breath sounds. No wheezing.   Skin:     General: Skin is warm and dry. "   Neurological:      Mental Status: He is alert and oriented to person, place, and time.        Result Review :   The following data was reviewed by: Gisela Stevenson MD on 03/08/2022:  Common labs    Common Labsle 6/4/21 6/4/21 9/7/21    0851 0851    WBC 6.44     Hemoglobin 13.5     Hematocrit 39.2     Platelets 137 (A)     Hemoglobin A1C  7.65 (A) 7.70 (A)                  Assessment and Plan    Diagnoses and all orders for this visit:    1. Type 2 diabetes mellitus with hyperglycemia, with long-term current use of insulin (HCC) (Primary)  -     Hemoglobin A1c; Future  -     Continuous Blood Gluc  (FreeStyle Gabriella 14 Day [place]) device; 1 each 1 (One) Time for 1 dose.  Dispense: 1 each; Refill: 0  -     Continuous Blood Gluc Sensor (FreeStyle Gabriella 14 Day Sensor) misc; 1 each Every 14 (Fourteen) Days.  Dispense: 6 each; Refill: 1    2. Essential hypertension  -     Lipid Panel; Future  -     CBC & Differential; Future  -     Comprehensive Metabolic Panel; Future    3. Trigeminal neuralgia  -     Carbamazepine Level, Free & Total; Future    4. Acquired hypothyroidism      Patient seen today for follow up  Type 2 diabetes not well controlled  Temporarily increase back to 175 units of basal  Continue with current sliding scale for now  Continue current oral medications  Patient has endocrinology referral next week  Sent prescription for Freestyle Gabriella system for closer glucose monitoring  Patient has recently been having hyperglycemia, but has had significant trouble with hypoglycemia in the past - which is why changes have been made to decrease basal insulin.  Patient will discuss management plan of glucose with endocrinology provider at upcoming visit  Recheck HgbA1c    Blood pressure controlled, continue current antihypertensive medications    Continue carbamazepine for trigeminal neuralgia, will work on getting it covered with [place]  For now can take 300 mg in the morning, 200 mg mid-day and 200 mg in the  evening  Check levels          Follow Up   Return in about 4 months (around 7/8/2022) for Medicare Wellness, Recheck.  Patient was given instructions and counseling regarding his condition or for health maintenance advice. Please see specific information pulled into the AVS if appropriate.         This document has been electronically signed by Gisela Stevenson MD

## 2022-03-09 ENCOUNTER — DOCUMENTATION (OUTPATIENT)
Dept: FAMILY MEDICINE CLINIC | Facility: CLINIC | Age: 72
End: 2022-03-09

## 2022-03-09 NOTE — PROGRESS NOTES
Prior Authorization was submitted for Carbamazepine Er 100MG ER tablets on 03/09/2022  (Key: [id number])

## 2022-03-11 ENCOUNTER — TELEPHONE (OUTPATIENT)
Dept: FAMILY MEDICINE CLINIC | Facility: CLINIC | Age: 72
End: 2022-03-11

## 2022-03-11 LAB
CARBAMAZEPINE FREE SERPL-MCNC: NORMAL UG/ML
CARBAMAZEPINE SERPL-MCNC: 7.2 UG/ML (ref 4–12)

## 2022-03-14 ENCOUNTER — TELEPHONE (OUTPATIENT)
Dept: FAMILY MEDICINE CLINIC | Facility: CLINIC | Age: 72
End: 2022-03-14

## 2022-03-14 DIAGNOSIS — G50.0 TRIGEMINAL NEURALGIA: ICD-10-CM

## 2022-03-14 DIAGNOSIS — R19.7 DIARRHEA, UNSPECIFIED TYPE: ICD-10-CM

## 2022-03-14 RX ORDER — CARBAMAZEPINE 100 MG/1
TABLET, EXTENDED RELEASE ORAL
Qty: 300 TABLET | Refills: 5 | Status: SHIPPED | OUTPATIENT
Start: 2022-03-14 | End: 2022-08-09

## 2022-03-14 NOTE — TELEPHONE ENCOUNTER
is calling for Mr. Zavaleta's lab work.     Refill has been sent.     I saw a Message on Friday where she called for his results as well as her results (Talia Zavaleta)     JAMIL Lanza

## 2022-03-14 NOTE — TELEPHONE ENCOUNTER
Incoming Refill Request      Medication requested (name and dose): carBAMazepine XR (TEGretol  XR) 100 MG 12 hr tablet    Pharmacy where request should be sent: [facility]    Additional details provided by patient: Patients wife also wanted to speak to someone about the test results    Best call back number: [phone]    Does the patient have less than a 3 day supply:  [x] Yes  [] No    Ciarra Villalpando Rep  03/14/22, 08:39 CDT

## 2022-03-15 ENCOUNTER — TELEPHONE (OUTPATIENT)
Dept: FAMILY MEDICINE CLINIC | Facility: CLINIC | Age: 72
End: 2022-03-15

## 2022-03-15 NOTE — TELEPHONE ENCOUNTER
Per Dr. Stevenson, Ms. Zavaelta has been called with recent Lab results and recommendations.   Continue current medications and follow-up as planned or sooner if any problems.

## 2022-03-15 NOTE — TELEPHONE ENCOUNTER
Ms. Zavaleta states they can no longer get Beka's Cholesterol Medication the Colestid 1 gram, he takes 2 gram BID.     I did tell her to give us a couple of days to get something else called in that you were having to leave due to a family emergency and she is good with that.   She said he had a couple of days left of the medication.

## 2022-03-15 NOTE — TELEPHONE ENCOUNTER
Please call and let patient know that carbamazepine level is normal.  CMP and CBC ok, will need to continue monitoring platelets (continues to be slightly low).  Cholesterol is ok.  HgbA1c is elevated at 9.1%.  Keep consultation appointment with endocrinology as scheduled. Thanks, GRACIA Stevenson

## 2022-03-17 RX ORDER — COLESEVELAM 180 1/1
1875 TABLET ORAL 2 TIMES DAILY WITH MEALS
Qty: 180 TABLET | Refills: 2 | Status: SHIPPED | OUTPATIENT
Start: 2022-03-17 | End: 2022-03-17 | Stop reason: CLARIF

## 2022-03-17 RX ORDER — MONTELUKAST SODIUM 4 MG/1
2 TABLET, CHEWABLE ORAL 2 TIMES DAILY
Qty: 360 TABLET | Refills: 3 | Status: SHIPPED | OUTPATIENT
Start: 2022-03-17 | End: 2022-08-09

## 2022-03-17 NOTE — TELEPHONE ENCOUNTER
Dr. Stevenson,     I have sent a refill on his Colestid to [facility].  They now have the medication in stock.    I have called [facility] and told them to void the WelChol script his Insurance would not pay for it anyway.

## 2022-03-17 NOTE — TELEPHONE ENCOUNTER
I sent colesevelam 625 mg tabs, take 3 tabs by mouth twice daily.  This will replace previous medication.  Sent to [facility].  Not sure if this will be covered either.  May have to check out paperwork to see what alternatives we have.  Thanks, GRACIA Stevenson

## 2022-03-18 ENCOUNTER — OFFICE VISIT (OUTPATIENT)
Dept: ENDOCRINOLOGY | Facility: CLINIC | Age: 72
End: 2022-03-18

## 2022-03-18 ENCOUNTER — DOCUMENTATION (OUTPATIENT)
Dept: ENDOCRINOLOGY | Facility: CLINIC | Age: 72
End: 2022-03-18

## 2022-03-18 VITALS
HEIGHT: 72 IN | BODY MASS INDEX: 31.64 KG/M2 | HEART RATE: 81 BPM | SYSTOLIC BLOOD PRESSURE: 128 MMHG | WEIGHT: 233.6 LBS | DIASTOLIC BLOOD PRESSURE: 70 MMHG | OXYGEN SATURATION: 97 %

## 2022-03-18 DIAGNOSIS — Z79.4 TYPE 2 DIABETES MELLITUS WITH HYPERGLYCEMIA, WITH LONG-TERM CURRENT USE OF INSULIN: ICD-10-CM

## 2022-03-18 DIAGNOSIS — E78.00 HYPERCHOLESTEROLEMIA: ICD-10-CM

## 2022-03-18 DIAGNOSIS — E11.65 TYPE 2 DIABETES MELLITUS WITH HYPERGLYCEMIA, WITH LONG-TERM CURRENT USE OF INSULIN: ICD-10-CM

## 2022-03-18 DIAGNOSIS — I10 ESSENTIAL HYPERTENSION: Primary | ICD-10-CM

## 2022-03-18 PROCEDURE — 99214 OFFICE O/P EST MOD 30 MIN: CPT | Performed by: NURSE PRACTITIONER

## 2022-03-18 NOTE — TELEPHONE ENCOUNTER
Incoming Refill Request      Medication requested (name and dose): METFORMIN     Pharmacy where request should be sent: [facility]     Additional details provided by patient:     Best call back number:     Does the patient have less than a 3 day supply:  [] Yes  [] No    Ciarra Juarez Rep  03/18/22, 10:41 CDT

## 2022-03-18 NOTE — PATIENT INSTRUCTIONS
Taking lantus 175  once daily - stop and change     Taking novolog --stop change to the following     using sliding scale     Taking metformin 500 mg 2 daily --keep     Taking januvia 100 mg --keep       Start Lantus 60 units once daily     Start novolog for meals     For small carb meal give  12 units     For regular carb meal give 16 units     For large carb meal give 20 units       Sliding scale     151-200     2 units   201-250     4 units   251-300    6 units   Above 301   8 units                   Goals for sugar    Fasting and before meals 80 to 130    2 hours after meals 180 or less      Aim for 60  grams of carbohydrate per meal    Aim for 15 grams of carbohydrate per snack

## 2022-03-18 NOTE — PROGRESS NOTES
"Chief Complaint  Diabetes    Subjective          Beka Zavaleta presents to [facility] ENDOCRINOLOGY  History of Present Illness     In office visit       Chief Complaint   Patient presents with   • Diabetes       HPI    Referring provider Gisela Stevenson MD     71 year old male presents for consultation         Reason -- diabetes mellitus type 2     Duration-- diagnosed before 1990     Context --- presented for work up for another problem and found to have diabetes     Timing constant     Quality  Not controlled     Severity moderate     Macrovascular complications  None     Microvascular complications --mild neuropathy, no DR , no renal disease     Current diabetes regimen       Insulin, oral medication     Current glucose monitoring     fingerstick's     Checks 4 times daily     History of low sugars       Am 200     Daytime 100 up to 300       Review of Systems - General ROS: negative              Objective   Vital Signs:   /70   Pulse 81   Ht 182.9 cm (72\")   Wt 106 kg (233 lb 9.6 oz)   SpO2 97%   BMI 31.68 kg/m²     Physical Exam  Constitutional:       Appearance: Normal appearance.   Cardiovascular:      Rate and Rhythm: Regular rhythm.      Heart sounds: Normal heart sounds.   Pulmonary:      Breath sounds: Normal breath sounds.   Musculoskeletal:      Cervical back: Normal range of motion.   Neurological:      Mental Status: He is alert.        Result Review :   The following data was reviewed by: EMMANUELLE Wallace on 03/18/2022:  Common labs    Common Labsle 6/4/21 6/4/21 9/7/21 3/8/22 3/8/22 3/8/22 3/8/22    0851 0851  0902 0902 0902 0902   Glucose      182 (A)    BUN      16    Creatinine      0.99    Sodium      140    Potassium      5.1    Chloride      102    Calcium      10.2    Albumin      4.80    Total Bilirubin      0.3    Alkaline Phosphatase      80    AST (SGOT)      17    ALT (SGPT)      24    WBC 6.44   5.45      Hemoglobin 13.5   13.8    "   Hematocrit 39.2   40.3      Platelets 137 (A)   120 (A)      Total Cholesterol     160     Triglycerides     254 (A)     HDL Cholesterol     41     LDL Cholesterol      77     Hemoglobin A1C  7.65 (A) 7.70 (A)    9.10 (A)   (A) Abnormal value                      Assessment and Plan    Diagnoses and all orders for this visit:    1. Essential hypertension (Primary)    2. Hypercholesterolemia    3. Type 2 diabetes mellitus with hyperglycemia, with long-term current use of insulin ([facility])                     Glycemic Management:    Diabetes mellitus type 2     Lab Results   Component Value Date    HGBA1C 9.10 (H) 03/08/2022       Side effects from Trulicity and jardiance     Taking lantus 175  once daily - stop and change     Taking novolog --stop change to the following     using sliding scale     Taking metformin 500 mg 2 daily --keep     Taking januvia 100 mg --keep       Start Lantus 60 units once daily     Start novolog for meals TID     4 units per 15 grams of CHO         For small carb meal give  12 units     For regular carb meal give 16 units     For large carb meal give 20 units       Sliding scale     151-200     2 units   201-250     4 units   251-300    6 units   Above 301   8 units                   Goals for sugar    Fasting and before meals 80 to 130    2 hours after meals 180 or less      Aim for 60  grams of carbohydrate per meal    Aim for 15 grams of carbohydrate per snack         Approve maco 2       The patient has diabetes mellitus, insulin-dependent.     Our Diabetes Department has evaluated the patient in the last six months and will continue counseling on insulin adjustment.      The patient performs blood glucose testing at least four times daily with proven glucose variability from 50 to 300 mg per dl.     The patient is administering basal insulin and prandial insulin four times per day for more than six months.     The patient uses a home blood glucose monitor to assess blood glucose at  least four times daily for more than six months.     The patient requires frequent adjustment of insulin treatment regimen based on blood glucose readings.     The patient has frequent variability in blood glucose readings due to activity and variability in meal content and time.      The patient has completed a diabetes education program with us.     The patient has demonstrated the ability to self-monitor her glucose.      The patient is motivated in improving diabetes control      The patient has hypoglycemia unawareness         Microvascular Complications Monitoring       Last eye exam--yearly , no DR     Neuropathy --mild       Lipid Management:       Lab Results   Component Value Date    LDL 77 03/08/2022            Taking lipitor 80 mg one at night         Blood Pressure Management:      Taking norvasc 10 mg daily         Thyroid Health    Lab Results   Component Value Date    TSH 0.574 11/26/2019           Bone Health       Lab Results   Component Value Date    CALCIUM 10.2 03/08/2022    PHOS 3.7 11/12/2015           Weight Management:      Obesity     Patient's Body mass index is 31.68 kg/m². indicating that he is obese (BMI >30). Obesity-related health conditions include the following: diabetes mellitus. Obesity is unchanged. BMI is is above average; no BMI management plan is appropriate. We discussed portion control and increasing exercise..        Preventive Care:     Non smoker       Records from Dr. Stevenson received and reviewed from 2022   Thank you for this consultation               Follow Up   No follow-ups on file.  Patient was given instructions and counseling regarding his condition or for health maintenance advice. Please see specific information pulled into the AVS if appropriate.         This document has been electronically signed by EMMANUELLE Wallace on March 18, 2022 11:41 CDT.

## 2022-03-25 ENCOUNTER — TELEPHONE (OUTPATIENT)
Dept: ENDOCRINOLOGY | Facility: CLINIC | Age: 72
End: 2022-03-25

## 2022-04-14 ENCOUNTER — TELEPHONE (OUTPATIENT)
Dept: FAMILY MEDICINE CLINIC | Facility: CLINIC | Age: 72
End: 2022-04-14

## 2022-04-14 NOTE — TELEPHONE ENCOUNTER
CHF nurse consult progress note- HF clinic follow up    EMR reviewed.  This is a 77-year-old [place] male who is well-known to the CHF clinic and has a past medical history of HFpEF, CKD, diabetes, dyslipidemia, hypertension, history of CAD.  He has been following Dr. Morgan for concern of cardiac amyloidosis he was admitted to the ICU in the past after developing a cardiac arrest from cardiac tamponade secondary to complication of a cardiac biopsy.  The patient presented to the ED on 1/28/2021 for evaluation of anuria despite taking diuretics and weight gain.  In the ED the patient was noted to have a temperature of 95.9, HR 66, RR 18, /62, SPO2 99%, and a weight of 160 pounds.  Labs are significant for sodium 133, creatinine 2.7, hemoglobin 7.6, and NT proBNP 358.  EKG showed sinus rhythm prolonged NM interval and right axis deviation with prolonged QT interval.  The patient was admitted for further evaluation of CYNDEE on CKD stage IV 2/2 hypervolemia.  Cardiology was consulted.  Diuretics are continued Bumex 1 mg twice daily and plan is for possible ascitic tap for symptomatic relief.    PMH:   Past Medical History:   Diagnosis Date   • Carotid bruit 12/2/2012    Carotid duplex (12/28/15): Bilateral 50-69% ICA stenosis, Lt ECA stenosis.   • CHF (congestive heart failure) (CMS/HCC)    • Chronic diastolic (congestive) heart failure (CMS/HCC) 7/12/2019    Admitted to [facility] (6/27/19 -6/30/19) with acute on chronic HFpEF. Echo (6/28/19): LVEF 64%, grade 1 DD, mild MR, normal RV.   • Diabetes (CMS/HCC)     type II   • Dyslipidemia 12/2/2012   • Essential hypertension 12/2/2012   • No known problems    • Non-ST elevation myocardial infarction (NSTEMI) (CMS/HCC) 3/30/2018    Minimal TnI elevation (0.08) during admission for acute HFpEF (6/27/19). Stress test (4/18/18): Negative at 5 min and 78% of pmHR       EF: 60%  Recent Results (from the past 8760 hours)   TRANSTHORACIC ECHO (TTE) LIMITED W/ W/O IMAGING AGENT     Called and did not reach Samia again.   Impression *[facility]*  [facility] [place] [address]  Limited Transthoracic Echocardiogram (TTE)    Patient: Che Juarez    Study Date/Time:    Dec 10 2020 11:47AM  MRN:     [id number]              FIN#:               [id number]  :     1943           Ht/Wt:              165cm 76kg  Age:     77                   BSA/BMI:            1.83m^2 27.9kg/m^2  Gender:  M                    Baseline BP:        122 / 50     Referring Physician:  Cindy Miranda     Attending Physician:  Valdemar Lu  Performing Physician: Raisa Sanderson DO  Diagnostic Physician: Raisa Sanderson DO  Sonographer:          Marta Mckay     --------------------------------------------------------------------------  INDICATIONS:   Pericardial effusion. Post myocardial biopsy.    --------------------------------------------------------------------------  STUDY CONCLUSIONS  SUMMARY:    1. Left ventricle: The cavity size is normal. Wall thickness is normal.     The ejection fraction was measured by visual estimation. The ejection     fraction is 60%.  2. Pericardium, extracardiac: A trivial pericardial effusion is     identified. There is no evidence of hemodynamic compromise. There is a     left pleural effusion.    --------------------------------------------------------------------------  STUDY DATA:  Comparison is made to the study of 2020.  Procedure:  Transthoracic echocardiography was performed. Image quality was adequate.  Study status:  Routine.  Study completion:  There were no complications.    FINDINGS    LEFT VENTRICLE:  The cavity size is normal. Wall thickness is normal.  Systolic function is normal. Wall motion is normal; there are no regional  wall motion abnormalities.    The ejection fraction was measured by visual  estimation. The ejection fraction is 60%.    RIGHT VENTRICLE:  Systolic pressure is within the normal range. The  estimated peak pressure is 27mm  Hg.    PERICARDIUM:  A trivial pericardial effusion is identified. There is no  evidence of hemodynamic compromise. There is a left pleural effusion.    --------------------------------------------------------------------------  Measurements     Left ventricle  Value   12/09/2020 Ref      Tricuspid valve            Value       12/09/2020 Ref   EF              60    % 65         52 - 72  TR peak v                  2.4   m/sec 2.8        <=2.8                                               Peak RV-RA grad, S         24    mm Hg 31         -----  Legend:  (L)  and  (H)  mona values outside specified reference range.    Prepared and electronically signed by  Raisa Sanderson DO  12/10/2020 15:33       Guideline directed medical therapy: Carvedilol, Bumex    NT Pro BNP:   NT proBNP (pg/mL)   Date Value   11/30/2020 8,273 (H)     NT-proBNP (pg/mL)   Date Value   01/29/2021 18,594 (H)        Recommendations:   Diuresis per physician's orders   Strict I's and O's   Continue to monitor electrolyte and creatinine level   2 g sodium diet, 2 L fluid restriction   Heart failure instructions reinforced.   Continue monitoring fluids and daily weights.      Education: 10 minutes - Follow up scheduled for Monday 2/1/21 at 0830am    Recommend Heart Failure Clinic follow up upon discharge.     Time spent in review: 10 minutes     Please do not hesitate to contact me if you have any questions, in house phone number      DAFNE Blank

## 2022-04-15 ENCOUNTER — OFFICE VISIT (OUTPATIENT)
Dept: ENDOCRINOLOGY | Facility: CLINIC | Age: 72
End: 2022-04-15

## 2022-04-15 VITALS
OXYGEN SATURATION: 96 % | DIASTOLIC BLOOD PRESSURE: 70 MMHG | BODY MASS INDEX: 30.48 KG/M2 | SYSTOLIC BLOOD PRESSURE: 146 MMHG | WEIGHT: 225 LBS | HEART RATE: 87 BPM | HEIGHT: 72 IN

## 2022-04-15 DIAGNOSIS — Z79.4 TYPE 2 DIABETES MELLITUS WITH HYPERGLYCEMIA, WITH LONG-TERM CURRENT USE OF INSULIN: Primary | ICD-10-CM

## 2022-04-15 DIAGNOSIS — E78.2 MIXED HYPERLIPIDEMIA: ICD-10-CM

## 2022-04-15 DIAGNOSIS — E11.65 TYPE 2 DIABETES MELLITUS WITH HYPERGLYCEMIA, WITH LONG-TERM CURRENT USE OF INSULIN: Primary | ICD-10-CM

## 2022-04-15 DIAGNOSIS — I10 ESSENTIAL HYPERTENSION: ICD-10-CM

## 2022-04-15 PROCEDURE — 99214 OFFICE O/P EST MOD 30 MIN: CPT | Performed by: NURSE PRACTITIONER

## 2022-05-03 ENCOUNTER — TELEPHONE (OUTPATIENT)
Dept: ENDOCRINOLOGY | Facility: CLINIC | Age: 72
End: 2022-05-03

## 2022-05-03 DIAGNOSIS — E11.649 TYPE 2 DIABETES MELLITUS WITH HYPOGLYCEMIA WITHOUT COMA, WITH LONG-TERM CURRENT USE OF INSULIN: ICD-10-CM

## 2022-05-03 DIAGNOSIS — Z79.4 TYPE 2 DIABETES MELLITUS WITH HYPOGLYCEMIA WITHOUT COMA, WITH LONG-TERM CURRENT USE OF INSULIN: ICD-10-CM

## 2022-05-12 ENCOUNTER — TELEPHONE (OUTPATIENT)
Dept: FAMILY MEDICINE CLINIC | Facility: CLINIC | Age: 72
End: 2022-05-12

## 2022-05-12 NOTE — TELEPHONE ENCOUNTER
Patients wife called checking stating Mr. Zavaleta's Novolog Flexpen was going to be sent over to Dr. Stevenson's office and she was checking to see if it was there yet.    Please advise @ [phone]

## 2022-07-13 ENCOUNTER — OFFICE VISIT (OUTPATIENT)
Dept: FAMILY MEDICINE CLINIC | Facility: CLINIC | Age: 72
End: 2022-07-13

## 2022-07-13 VITALS
SYSTOLIC BLOOD PRESSURE: 138 MMHG | DIASTOLIC BLOOD PRESSURE: 68 MMHG | WEIGHT: 219 LBS | OXYGEN SATURATION: 98 % | HEART RATE: 65 BPM | BODY MASS INDEX: 29.66 KG/M2 | HEIGHT: 72 IN

## 2022-07-13 DIAGNOSIS — Z79.4 TYPE 2 DIABETES MELLITUS WITH HYPERGLYCEMIA, WITH LONG-TERM CURRENT USE OF INSULIN: ICD-10-CM

## 2022-07-13 DIAGNOSIS — E11.65 TYPE 2 DIABETES MELLITUS WITH HYPERGLYCEMIA, WITH LONG-TERM CURRENT USE OF INSULIN: ICD-10-CM

## 2022-07-13 DIAGNOSIS — L98.9 LESION OF SKIN OF SCALP: ICD-10-CM

## 2022-07-13 DIAGNOSIS — I10 ESSENTIAL HYPERTENSION: ICD-10-CM

## 2022-07-13 DIAGNOSIS — L98.9 SKIN LESION OF NECK: ICD-10-CM

## 2022-07-13 DIAGNOSIS — Z00.00 MEDICARE ANNUAL WELLNESS VISIT, SUBSEQUENT: Primary | ICD-10-CM

## 2022-07-13 PROCEDURE — 1126F AMNT PAIN NOTED NONE PRSNT: CPT | Performed by: FAMILY MEDICINE

## 2022-07-13 PROCEDURE — G0439 PPPS, SUBSEQ VISIT: HCPCS | Performed by: FAMILY MEDICINE

## 2022-07-13 PROCEDURE — 1159F MED LIST DOCD IN RCRD: CPT | Performed by: FAMILY MEDICINE

## 2022-07-13 PROCEDURE — 1170F FXNL STATUS ASSESSED: CPT | Performed by: FAMILY MEDICINE

## 2022-07-13 RX ORDER — HYDROCHLOROTHIAZIDE 12.5 MG/1
1 TABLET ORAL DAILY
COMMUNITY
Start: 2022-03-15

## 2022-07-13 RX ORDER — LISINOPRIL 40 MG/1
60 TABLET ORAL DAILY
Qty: 180 TABLET | Refills: 3
Start: 2022-07-13 | End: 2022-08-24

## 2022-07-13 RX ORDER — METOPROLOL SUCCINATE 100 MG/1
100 TABLET, EXTENDED RELEASE ORAL DAILY
Qty: 90 TABLET | Refills: 3 | Status: SHIPPED | OUTPATIENT
Start: 2022-07-13

## 2022-07-13 NOTE — PATIENT INSTRUCTIONS
Medicare Wellness  Personal Prevention Plan of Service     Date of Office Visit:    Encounter Provider:  Gisela Stevenson MD  Place of Service:  [facility] - [place]  Patient Name: Beka Zavaleta  :  1950    As part of the Medicare Wellness portion of your visit today, we are providing you with this personalized preventive plan of services (PPPS). This plan is based upon recommendations of the [place] Preventive Services Task Force (USPSTF) and the Advisory Committee on Immunization Practices (ACIP).    This lists the preventive care services that should be considered, and provides dates of when you are due. Items listed as completed are up-to-date and do not require any further intervention.    Health Maintenance   Topic Date Due    TDAP/TD VACCINES (2 - Td or Tdap) 2020    DIABETIC FOOT EXAM  2021    ANNUAL WELLNESS VISIT  2021    COVID-19 Vaccine (4 - Booster for Pfizer series) 2022    URINE MICROALBUMIN  2022    HEMOGLOBIN A1C  2022    DIABETIC EYE EXAM  2022    INFLUENZA VACCINE  10/01/2022    LIPID PANEL  2023    COLORECTAL CANCER SCREENING  2024    HEPATITIS C SCREENING  Completed    Pneumococcal Vaccine 65+  Completed    AAA SCREEN (ONE-TIME)  Completed    ZOSTER VACCINE  Completed       Orders Placed This Encounter   Procedures    Ambulatory Referral to Dermatology     Referral Priority:   Routine     Referral Type:   Consultation     Referral Reason:   Specialty Services Required     Referral Location:   [facility] DERMATOLOGY     Requested Specialty:   Dermatology     Number of Visits Requested:   1       Return in about 4 months (around 2022) for Recheck.

## 2022-07-13 NOTE — PROGRESS NOTES
The ABCs of the Annual Wellness Visit  Subsequent Medicare Wellness Visit    Chief Complaint   Patient presents with   • Medicare Wellness-subsequent     Subjective    History of Present Illness:  Beka Zavaleta is a 71 y.o. male who presents for a Subsequent Medicare Wellness Visit.    The following portions of the patient's history were reviewed and   updated as appropriate: allergies, current medications, past family history, past medical history, past social history, past surgical history and problem list.    Compared to one year ago, the patient feels his physical   health is the same.    Compared to one year ago, the patient feels his mental   health is the same.    Recent Hospitalizations:  He was not admitted to the hospital during the last year.       Current Medical Providers:  Patient Care Team:  Gisela Stevenson MD as PCP - General (Family Medicine)  Riley Osorio MD as Surgeon (General Surgery)  Mario Smart MD as Surgeon (Orthopedic Surgery)  Tam Daley MD as Consulting Physician (Ophthalmology)  Ej Hatch MD as Consulting Physician (Interventional Cardiology)  Chao Berrios APRN as Nurse Practitioner (Endocrinology)    Outpatient Medications Prior to Visit   Medication Sig Dispense Refill   • amLODIPine (NORVASC) 10 MG tablet Take 1 tablet by mouth Daily. 90 tablet 3   • amoxicillin (AMOXIL) 500 MG capsule Take 1 capsule by mouth 2 (Two) Times a Day. 20 capsule 0   • aspirin 81 MG tablet Take 81 mg by mouth daily.     • atorvastatin (LIPITOR) 80 MG tablet Take 1 tablet by mouth Daily. 90 tablet 3   • carBAMazepine XR (TEGretol  XR) 100 MG 12 hr tablet Take 4 tabs by mouth in the morning, 3 tabs at lunch and 3 tabs at bedtime 300 tablet 5   • clopidogrel (PLAVIX) 75 MG tablet Take 1 tablet by mouth Daily. 90 tablet 3   • colestipol (COLESTID) 1 g tablet Take 2 tablets by mouth 2 (Two) Times a Day. 360 tablet 3   • Continuous Blood Gluc Sensor (FreeStyle  "Gabriella 14 Day Sensor) misc 1 each Every 14 (Fourteen) Days. 6 each 1   • cyanocobalamin 1000 MCG/ML injection INJECT 1 ML AS DIRECTED EVERY 28 DAYS (INTRAMUSCULAR) 1 mL 12   • doxazosin (CARDURA) 4 MG tablet Take 1 tablet by mouth Every Night. 90 tablet 3   • Embrace Lancets Ultra Thin 30G Use with testing blood sugars 3-4 times daily as instructed 400 each 3   • furosemide (LASIX) 20 MG tablet TAKE ONE TABLET BY MOUTH DAILY FOR 3 DAYS THEN TAKE AS NEEDED FOR INCREASED SHORTNESS OF AIR EDEMA OR 2 3 LB WEIGHT GAIN IN 24 HOURS  3   • glucose blood test strip Test Blood Sugars up to 4 times daily as instructed. 360 each 3   • insulin aspart (novoLOG FLEXPEN) 100 UNIT/ML solution pen-injector sc pen Up to 20 units with each meal TID 9 mL 3   • insulin glargine (Lantus) 100 UNIT/ML injection Inject 185 Units under the skin into the appropriate area as directed Daily. 200 mL 3   • Insulin Syringe-Needle U-100 30G X 7/16\" 1 ML misc daily.     • isosorbide mononitrate (IMDUR) 30 MG 24 hr tablet Take 1 tablet by mouth Daily. Take 1/2 pill once a day (Patient taking differently: Take 30 mg by mouth Daily.) 30 tablet 5   • lisinopril (PRINIVIL,ZESTRIL) 40 MG tablet Take 1.5 tablets by mouth Daily. 180 tablet 3   • magnesium oxide (MAGOX) 400 (241.3 MG) MG tablet tablet Take 400 mg by mouth Daily.     • metFORMIN (GLUCOPHAGE) 500 MG tablet Take 1 tablet by mouth 2 (Two) Times a Day With Meals. 180 tablet 3   • metoprolol succinate XL (Toprol XL) 100 MG 24 hr tablet Take 1 tablet by mouth Daily. 90 tablet 3   • Multiple Vitamins-Minerals (ICAPS PO) Take  by mouth daily.     • nitroglycerin (NITROSTAT) 0.4 MG SL tablet Place 0.4 mg under the tongue As Needed for Chest Pain. repeat X 1 in 5 min. if not relieved and then got to ER or call an ambulance     • omeprazole (priLOSEC) 40 MG capsule Take 1 capsule by mouth Daily. 90 capsule 3   • potassium chloride (K-DUR) 10 MEQ CR tablet TAKE ONE TABLET BY MOUTH DAILY FOR 3 DAYS AND THEN " AS NEEDED WITH LASIX  3   • ranolazine (RANEXA) 500 MG 12 hr tablet Take 1 tablet by mouth Every 12 (Twelve) Hours.     • SITagliptin (Januvia) 100 MG tablet Take 1 tablet by mouth Daily. 90 tablet 1   • verapamil SR (CALAN-SR) 120 MG CR tablet Take 240 mg by mouth.       No facility-administered medications prior to visit.       No opioid medication identified on active medication list. I have reviewed chart for other potential  high risk medication/s and harmful drug interactions in the elderly.          Aspirin is on active medication list. Aspirin use is indicated based on review of current medical condition/s. Pros and cons of this therapy have been discussed today. Benefits of this medication outweigh potential harm.  Patient has been encouraged to continue taking this medication.  .      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, mild stage   • Hepatic steatosis   • Nuclear cataract   • Cortical age-related cataract   • Palpitation   • Cellulitis of right hand   • Cellulitis of finger of right hand   • Vitamin deficiency   • Type 2 diabetes mellitus with hyperglycemia, with long-term current use of insulin (HCC)   • Plantar fasciitis   • Malaise and fatigue   • Hyperkalemia   • History of Holter monitoring   • History of echocardiogram   • Goiter   • Medicare annual wellness visit, subsequent   • Diverticular disease of colon   • Chronic left shoulder pain   • Type 2 diabetes mellitus without complication, without long-term current use of insulin (HCC)   • Rotator cuff syndrome   • Impingement syndrome of left shoulder   • Encounter for  "colonoscopy due to history of adenomatous colonic polyps   • Vitamin B 12 deficiency   • Mixed hyperlipidemia     Advance Care Planning  Advance Directive is not on file.  ACP discussion was held with the patient during this visit. Patient does not have an advance directive, information provided.          Objective    Vitals:    22 0809   BP: 138/68   Pulse: 65   SpO2: 98%   Weight: 99.3 kg (219 lb)   Height: 182.9 cm (72\")   PainSc: 0-No pain     Estimated body mass index is 29.7 kg/m² as calculated from the following:    Height as of this encounter: 182.9 cm (72\").    Weight as of this encounter: 99.3 kg (219 lb).    BMI is >= 25 and <30. (Overweight) The following options were offered after discussion;: exercise counseling/recommendations and nutrition counseling/recommendations      Does the patient have evidence of cognitive impairment? No    Physical Exam  Vitals reviewed.   Constitutional:       General: He is not in acute distress.     Appearance: He is well-developed.   Cardiovascular:      Rate and Rhythm: Normal rate and regular rhythm.      Heart sounds: No murmur heard.  Pulmonary:      Effort: Pulmonary effort is normal. No respiratory distress.      Breath sounds: Normal breath sounds. No wheezing.   Abdominal:      Palpations: Abdomen is soft.      Tenderness: There is no abdominal tenderness.   Musculoskeletal:      Cervical back: Neck supple.   Skin:     General: Skin is warm and dry.      Comments: Raised skin lesion on left lateral neck, some increased vascularity and rolled borders.  Two ulcerated skin lesions on patient's scalp, more posterior lesion is larger in size   Neurological:      Mental Status: He is alert and oriented to person, place, and time.                 HEALTH RISK ASSESSMENT    Smoking Status:  Social History     Tobacco Use   Smoking Status Former Smoker   • Types: Cigarettes   • Quit date: 1988   • Years since quittin.5   Smokeless Tobacco Former User   • " Types: Chew     Alcohol Consumption:  Social History     Substance and Sexual Activity   Alcohol Use No     Fall Risk Screen:    SUSSY Fall Risk Assessment has not been completed.    Depression Screening:  PHQ-2/PHQ-9 Depression Screening 3/2/2021   Retired Total Score 0       Health Habits and Functional and Cognitive Screening:  Functional & Cognitive Status 5/28/2019   Do you have difficulty preparing food and eating? No   Do you have difficulty bathing yourself, getting dressed or grooming yourself? No   Do you have difficulty using the toilet? No   Do you have difficulty moving around from place to place? No   Do you have trouble with steps or getting out of a bed or a chair? No   Current Diet Diabetic Diet   Dental Exam Up to date   Eye Exam Up to date   Exercise (times per week) 0 times per week   Current Exercise Activities Include None   Do you need help using the phone?  No   Are you deaf or do you have serious difficulty hearing?  Yes   Do you need help with transportation? No   Do you need help shopping? No   Do you need help preparing meals?  No   Do you need help with housework?  No   Do you need help with laundry? No   Do you need help taking your medications? No   Do you need help managing money? No   Do you ever drive or ride in a car without wearing a seat belt? No   Have you felt unusual stress, anger or loneliness in the last month? No   Who do you live with? Spouse   If you need help, do you have trouble finding someone available to you? No   Have you been bothered in the last four weeks by sexual problems? Yes   Do you have difficulty concentrating, remembering or making decisions? No       Age-appropriate Screening Schedule:  Refer to the list below for future screening recommendations based on patient's age, sex and/or medical conditions. Orders for these recommended tests are listed in the plan section. The patient has been provided with a written plan.    Health Maintenance   Topic Date  Due   • TDAP/TD VACCINES (2 - Td or Tdap) 04/28/2020   • DIABETIC FOOT EXAM  02/26/2021   • URINE MICROALBUMIN  03/02/2022   • HEMOGLOBIN A1C  09/08/2022   • DIABETIC EYE EXAM  09/27/2022   • INFLUENZA VACCINE  10/01/2022   • LIPID PANEL  03/08/2023   • ZOSTER VACCINE  Completed              Assessment & Plan   CMS Preventative Services Quick Reference  Risk Factors Identified During Encounter  Inactivity/Sedentary  Obesity/Overweight   Polypharmacy  The above risks/problems have been discussed with the patient.  Follow up actions/plans if indicated are seen below in the Assessment/Plan Section.  Pertinent information has been shared with the patient in the After Visit Summary.    Diagnoses and all orders for this visit:    1. Medicare annual wellness visit, subsequent (Primary)    2. Essential hypertension  -     lisinopril (PRINIVIL,ZESTRIL) 40 MG tablet; Take 1.5 tablets by mouth Daily.  Dispense: 180 tablet; Refill: 3  -     metoprolol succinate XL (Toprol XL) 100 MG 24 hr tablet; Take 1 tablet by mouth Daily.  Dispense: 90 tablet; Refill: 3    3. Type 2 diabetes mellitus with hyperglycemia, with long-term current use of insulin (HCC)    4. Lesion of skin of scalp  -     Ambulatory Referral to Dermatology    5. Skin lesion of neck  -     Ambulatory Referral to Dermatology      Medicare wellness visit completed today  Hypertension controlled, continue current medication  Type 2 diabetes managed by endocrinology, improving but still having hyperglycemia regularly  Skin lesion of neck, treatment with cryotherapy by previous PCP, lesion returned  Skin lesion of scalp has been present for months, nonhealing  Referral to dermatology for additional evaluation and management of these lesions    Follow Up:   Return in about 4 months (around 11/13/2022) for Recheck.     An After Visit Summary and PPPS were made available to the patient.                     This document has been electronically signed by Gisela Stevenson,  MD

## 2022-07-25 ENCOUNTER — OFFICE VISIT (OUTPATIENT)
Dept: ENDOCRINOLOGY | Facility: CLINIC | Age: 72
End: 2022-07-25

## 2022-07-25 ENCOUNTER — LAB (OUTPATIENT)
Dept: LAB | Facility: HOSPITAL | Age: 72
End: 2022-07-25

## 2022-07-25 VITALS
DIASTOLIC BLOOD PRESSURE: 70 MMHG | WEIGHT: 220 LBS | HEART RATE: 76 BPM | HEIGHT: 72 IN | SYSTOLIC BLOOD PRESSURE: 136 MMHG | BODY MASS INDEX: 29.8 KG/M2 | OXYGEN SATURATION: 96 %

## 2022-07-25 DIAGNOSIS — E78.2 MIXED HYPERLIPIDEMIA: ICD-10-CM

## 2022-07-25 DIAGNOSIS — E11.65 TYPE 2 DIABETES MELLITUS WITH HYPERGLYCEMIA, WITH LONG-TERM CURRENT USE OF INSULIN: Primary | ICD-10-CM

## 2022-07-25 DIAGNOSIS — E53.8 VITAMIN B 12 DEFICIENCY: ICD-10-CM

## 2022-07-25 DIAGNOSIS — E55.9 VITAMIN D DEFICIENCY, UNSPECIFIED: ICD-10-CM

## 2022-07-25 DIAGNOSIS — Z79.4 TYPE 2 DIABETES MELLITUS WITH HYPERGLYCEMIA, WITH LONG-TERM CURRENT USE OF INSULIN: Primary | ICD-10-CM

## 2022-07-25 DIAGNOSIS — I10 ESSENTIAL HYPERTENSION: ICD-10-CM

## 2022-07-25 LAB
25(OH)D3 SERPL-MCNC: 38.4 NG/ML (ref 30–100)
ALBUMIN SERPL-MCNC: 4.2 G/DL (ref 3.5–5.2)
ALBUMIN UR-MCNC: <1.2 MG/DL
ALBUMIN/GLOB SERPL: 1.6 G/DL
ALP SERPL-CCNC: 81 U/L (ref 39–117)
ALT SERPL W P-5'-P-CCNC: 24 U/L (ref 1–41)
ANION GAP SERPL CALCULATED.3IONS-SCNC: 9 MMOL/L (ref 5–15)
AST SERPL-CCNC: 23 U/L (ref 1–40)
BASOPHILS # BLD AUTO: 0.04 10*3/MM3 (ref 0–0.2)
BASOPHILS NFR BLD AUTO: 0.7 % (ref 0–1.5)
BILIRUB SERPL-MCNC: 0.3 MG/DL (ref 0–1.2)
BUN SERPL-MCNC: 29 MG/DL (ref 8–23)
BUN/CREAT SERPL: 21.8 (ref 7–25)
CALCIUM SPEC-SCNC: 10 MG/DL (ref 8.6–10.5)
CHLORIDE SERPL-SCNC: 104 MMOL/L (ref 98–107)
CHOLEST SERPL-MCNC: 164 MG/DL (ref 0–200)
CO2 SERPL-SCNC: 28 MMOL/L (ref 22–29)
CREAT SERPL-MCNC: 1.33 MG/DL (ref 0.76–1.27)
CREAT UR-MCNC: 141.1 MG/DL
DEPRECATED RDW RBC AUTO: 43.8 FL (ref 37–54)
EGFRCR SERPLBLD CKD-EPI 2021: 56.4 ML/MIN/1.73
EOSINOPHIL # BLD AUTO: 0.04 10*3/MM3 (ref 0–0.4)
EOSINOPHIL NFR BLD AUTO: 0.7 % (ref 0.3–6.2)
ERYTHROCYTE [DISTWIDTH] IN BLOOD BY AUTOMATED COUNT: 13 % (ref 12.3–15.4)
GLOBULIN UR ELPH-MCNC: 2.7 GM/DL
GLUCOSE SERPL-MCNC: 255 MG/DL (ref 65–99)
HBA1C MFR BLD: 8.8 % (ref 4.8–5.6)
HCT VFR BLD AUTO: 37.9 % (ref 37.5–51)
HDLC SERPL-MCNC: 38 MG/DL (ref 40–60)
HGB BLD-MCNC: 13 G/DL (ref 13–17.7)
IMM GRANULOCYTES # BLD AUTO: 0.04 10*3/MM3 (ref 0–0.05)
IMM GRANULOCYTES NFR BLD AUTO: 0.7 % (ref 0–0.5)
LDLC SERPL CALC-MCNC: 72 MG/DL (ref 0–100)
LDLC/HDLC SERPL: 1.53 {RATIO}
LYMPHOCYTES # BLD AUTO: 2.17 10*3/MM3 (ref 0.7–3.1)
LYMPHOCYTES NFR BLD AUTO: 40 % (ref 19.6–45.3)
MCH RBC QN AUTO: 31.6 PG (ref 26.6–33)
MCHC RBC AUTO-ENTMCNC: 34.3 G/DL (ref 31.5–35.7)
MCV RBC AUTO: 92.2 FL (ref 79–97)
MICROALBUMIN/CREAT UR: NORMAL MG/G{CREAT}
MONOCYTES # BLD AUTO: 0.5 10*3/MM3 (ref 0.1–0.9)
MONOCYTES NFR BLD AUTO: 9.2 % (ref 5–12)
NEUTROPHILS NFR BLD AUTO: 2.64 10*3/MM3 (ref 1.7–7)
NEUTROPHILS NFR BLD AUTO: 48.7 % (ref 42.7–76)
NRBC BLD AUTO-RTO: 0 /100 WBC (ref 0–0.2)
PLATELET # BLD AUTO: 143 10*3/MM3 (ref 140–450)
PMV BLD AUTO: 11.2 FL (ref 6–12)
POTASSIUM SERPL-SCNC: 6 MMOL/L (ref 3.5–5.2)
PROT SERPL-MCNC: 6.9 G/DL (ref 6–8.5)
RBC # BLD AUTO: 4.11 10*6/MM3 (ref 4.14–5.8)
SODIUM SERPL-SCNC: 141 MMOL/L (ref 136–145)
TRIGL SERPL-MCNC: 339 MG/DL (ref 0–150)
TSH SERPL DL<=0.05 MIU/L-ACNC: 0.69 UIU/ML (ref 0.27–4.2)
VLDLC SERPL-MCNC: 54 MG/DL (ref 5–40)
WBC NRBC COR # BLD: 5.43 10*3/MM3 (ref 3.4–10.8)

## 2022-07-25 PROCEDURE — 80053 COMPREHEN METABOLIC PANEL: CPT | Performed by: NURSE PRACTITIONER

## 2022-07-25 PROCEDURE — 83036 HEMOGLOBIN GLYCOSYLATED A1C: CPT | Performed by: NURSE PRACTITIONER

## 2022-07-25 PROCEDURE — 36415 COLL VENOUS BLD VENIPUNCTURE: CPT | Performed by: NURSE PRACTITIONER

## 2022-07-25 PROCEDURE — 82570 ASSAY OF URINE CREATININE: CPT | Performed by: NURSE PRACTITIONER

## 2022-07-25 PROCEDURE — 99214 OFFICE O/P EST MOD 30 MIN: CPT | Performed by: NURSE PRACTITIONER

## 2022-07-25 PROCEDURE — 95251 CONT GLUC MNTR ANALYSIS I&R: CPT | Performed by: NURSE PRACTITIONER

## 2022-07-25 PROCEDURE — 85025 COMPLETE CBC W/AUTO DIFF WBC: CPT | Performed by: NURSE PRACTITIONER

## 2022-07-25 PROCEDURE — 82306 VITAMIN D 25 HYDROXY: CPT | Performed by: NURSE PRACTITIONER

## 2022-07-25 PROCEDURE — 84443 ASSAY THYROID STIM HORMONE: CPT | Performed by: NURSE PRACTITIONER

## 2022-07-25 PROCEDURE — 82043 UR ALBUMIN QUANTITATIVE: CPT | Performed by: NURSE PRACTITIONER

## 2022-07-25 PROCEDURE — 80061 LIPID PANEL: CPT | Performed by: NURSE PRACTITIONER

## 2022-07-25 NOTE — PROGRESS NOTES
"Chief Complaint  Diabetes    Subjective          Beka Zavaleta presents to [facility] ENDOCRINOLOGY  History of Present Illness       In office visit      Referring provider Gisela Stevenson MD      71 year old male presents for follow up          Reason -- diabetes mellitus type 2      Duration-- diagnosed before 1990      Context --- presented for work up for another problem and found to have diabetes      Timing constant      Quality  Not controlled      Severity moderate      Macrovascular complications  None      Microvascular complications --mild neuropathy, no DR , no renal disease      Current diabetes regimen         Insulin, oral medication      Current glucose monitoring      fingerstick's      Checks 4 times daily      History of low sugars     Using maco          Review of Systems - General ROS: negative             Objective   Vital Signs:   /70   Pulse 76   Ht 182.9 cm (72\")   Wt 99.8 kg (220 lb)   SpO2 96%   BMI 29.84 kg/m²     Physical Exam  Constitutional:       Appearance: Normal appearance.   Cardiovascular:      Rate and Rhythm: Regular rhythm.      Heart sounds: Normal heart sounds.   Pulmonary:      Breath sounds: Normal breath sounds.   Musculoskeletal:      Cervical back: Normal range of motion.   Neurological:      Mental Status: He is alert.        Result Review :   The following data was reviewed by: EMMANUELLE Wallace on 04/15/2022:  Common labs    Common Labsle 9/7/21 3/8/22 3/8/22 3/8/22 3/8/22     0902 0902 0902 0902   Glucose    182 (A)    BUN    16    Creatinine    0.99    Sodium    140    Potassium    5.1    Chloride    102    Calcium    10.2    Albumin    4.80    Total Bilirubin    0.3    Alkaline Phosphatase    80    AST (SGOT)    17    ALT (SGPT)    24    WBC  5.45      Hemoglobin  13.8      Hematocrit  40.3      Platelets  120 (A)      Total Cholesterol   160     Triglycerides   254 (A)     HDL Cholesterol   41     LDL " Cholesterol    77     Hemoglobin A1C 7.70 (A)    9.10 (A)   (A) Abnormal value                        Assessment and Plan    Diagnoses and all orders for this visit:    1. Type 2 diabetes mellitus with hyperglycemia, with long-term current use of insulin (HCC) (Primary)  -     CBC & Differential  -     Comprehensive Metabolic Panel  -     Hemoglobin A1c  -     Lipid Panel  -     TSH  -     Vitamin D 25 Hydroxy  -     Microalbumin / Creatinine Urine Ratio - Urine, Clean Catch    2. Essential hypertension  -     CBC & Differential  -     Comprehensive Metabolic Panel  -     Hemoglobin A1c  -     Lipid Panel  -     TSH  -     Vitamin D 25 Hydroxy  -     Microalbumin / Creatinine Urine Ratio - Urine, Clean Catch    3. Vitamin B 12 deficiency  -     CBC & Differential  -     Comprehensive Metabolic Panel  -     Hemoglobin A1c  -     Lipid Panel  -     TSH  -     Vitamin D 25 Hydroxy  -     Microalbumin / Creatinine Urine Ratio - Urine, Clean Catch    4. Mixed hyperlipidemia  -     CBC & Differential  -     Comprehensive Metabolic Panel  -     Hemoglobin A1c  -     Lipid Panel  -     TSH  -     Vitamin D 25 Hydroxy  -     Microalbumin / Creatinine Urine Ratio - Urine, Clean Catch    5. Vitamin D deficiency, unspecified   -     Vitamin D 25 Hydroxy           Glycemic Management:     Diabetes mellitus type 2      Lab Results   Component Value Date    HGBA1C 9.10 (H) 03/08/2022       Side effects from Trulicity and jardiance       Gabriella     Download and reviewed    Dated from July 12 to July 25, 2022     Average bg 219    Time in target 20%     High 52%     Very high 28%     Low 0%     Very low 0%     Hyperglycemia overnight and after breakfast            Taking metformin 500 mg 2 daily --keep      Taking januvia 100 mg --keep          Lantus 65 units once daily increase to 70 units      novolog for meals TID      4 units per 15 grams of CHO            For small carb meal give  12 units      For regular carb meal give 16  units      For large carb meal give  24 units for breakfast         Sliding scale      151-200     2 units   201-250     4 units   251-300    6 units   Above 301   8 units                           Goals for sugar     Fasting and before meals 80 to 130     2 hours after meals 180 or less        Aim for 60  grams of carbohydrate per meal     Aim for 15 grams of carbohydrate per snack                         Microvascular Complications Monitoring         Last eye exam--yearly , no DR      Neuropathy --mild         Lipid Management:               Lab Results   Component Value Date     LDL 77 03/08/2022               Taking lipitor 80 mg one at night     Taking fish oil            Blood Pressure Management:        Taking norvasc 10 mg daily            Thyroid Health           Lab Results   Component Value Date     TSH 0.574 11/26/2019               Bone Health               Lab Results   Component Value Date     CALCIUM 10.2 03/08/2022     PHOS 3.7 11/12/2015          taking vitamin d       Now taking vitamin b 12          Weight Management:        Obesity        Patient's Body mass index is 29.84 kg/m². indicating that he is obese (BMI >30). Obesity-related health conditions include the following: diabetes mellitus. Obesity is unchanged. BMI is is above average; no BMI management plan is appropriate. We discussed portion control and increasing exercise..        Preventive Care:      Non smoker                              Follow Up   Return in about 3 months (around 10/25/2022) for Recheck.  Patient was given instructions and counseling regarding his condition or for health maintenance advice. Please see specific information pulled into the AVS if appropriate.         This document has been electronically signed by EMMANUELLE Wallace on July 25, 2022 09:00 CDT.

## 2022-07-27 ENCOUNTER — TELEPHONE (OUTPATIENT)
Dept: ENDOCRINOLOGY | Facility: CLINIC | Age: 72
End: 2022-07-27

## 2022-08-09 DIAGNOSIS — R19.7 DIARRHEA, UNSPECIFIED TYPE: ICD-10-CM

## 2022-08-09 DIAGNOSIS — G50.0 TRIGEMINAL NEURALGIA: ICD-10-CM

## 2022-08-09 RX ORDER — MONTELUKAST SODIUM 4 MG/1
2 TABLET, CHEWABLE ORAL 2 TIMES DAILY
Qty: 120 TABLET | Refills: 3 | Status: SHIPPED | OUTPATIENT
Start: 2022-08-09 | End: 2022-12-12

## 2022-08-09 RX ORDER — DOXAZOSIN MESYLATE 4 MG/1
TABLET ORAL
Qty: 90 TABLET | Refills: 3 | Status: SHIPPED | OUTPATIENT
Start: 2022-08-09

## 2022-08-09 RX ORDER — CARBAMAZEPINE 100 MG/1
TABLET, EXTENDED RELEASE ORAL
Qty: 300 TABLET | Refills: 5 | Status: SHIPPED | OUTPATIENT
Start: 2022-08-09 | End: 2022-12-20 | Stop reason: SDUPTHER

## 2022-08-23 ENCOUNTER — TELEPHONE (OUTPATIENT)
Dept: ENDOCRINOLOGY | Facility: CLINIC | Age: 72
End: 2022-08-23

## 2022-08-24 ENCOUNTER — TELEPHONE (OUTPATIENT)
Dept: ENDOCRINOLOGY | Facility: CLINIC | Age: 72
End: 2022-08-24

## 2022-08-24 DIAGNOSIS — I10 ESSENTIAL HYPERTENSION: ICD-10-CM

## 2022-08-24 RX ORDER — LISINOPRIL 40 MG/1
TABLET ORAL
Qty: 180 TABLET | Refills: 0 | Status: SHIPPED | OUTPATIENT
Start: 2022-08-24 | End: 2022-11-16 | Stop reason: SDUPTHER

## 2022-08-24 RX ORDER — INSULIN ASPART 100 [IU]/ML
INJECTION, SOLUTION INTRAVENOUS; SUBCUTANEOUS
Qty: 9 PEN | Refills: 11 | Status: SHIPPED | OUTPATIENT
Start: 2022-08-24 | End: 2022-08-26 | Stop reason: SDUPTHER

## 2022-08-25 ENCOUNTER — DOCUMENTATION (OUTPATIENT)
Dept: ENDOCRINOLOGY | Facility: CLINIC | Age: 72
End: 2022-08-25

## 2022-08-26 ENCOUNTER — TELEPHONE (OUTPATIENT)
Dept: ENDOCRINOLOGY | Facility: CLINIC | Age: 72
End: 2022-08-26

## 2022-08-26 DIAGNOSIS — Z79.4 TYPE 2 DIABETES MELLITUS WITH HYPERGLYCEMIA, WITH LONG-TERM CURRENT USE OF INSULIN: Primary | ICD-10-CM

## 2022-08-26 DIAGNOSIS — E11.65 TYPE 2 DIABETES MELLITUS WITH HYPERGLYCEMIA, WITH LONG-TERM CURRENT USE OF INSULIN: Primary | ICD-10-CM

## 2022-08-26 RX ORDER — INSULIN ASPART 100 [IU]/ML
INJECTION, SOLUTION INTRAVENOUS; SUBCUTANEOUS
Qty: 10 PEN | Refills: 11 | Status: SHIPPED | OUTPATIENT
Start: 2022-08-26

## 2022-08-26 NOTE — TELEPHONE ENCOUNTER
PATIENTS WIFE CALLED STATING THEY RECEIVED A COUPON TO RECEIVE NOVOLOG BUT NEED A SCRIPT SENT TO [facility] IN [facility] FOR 2 BOXES OF NOVOLOG.     PLEASE ADVISE    THANKS

## 2022-09-02 ENCOUNTER — TELEPHONE (OUTPATIENT)
Dept: ENDOCRINOLOGY | Facility: CLINIC | Age: 72
End: 2022-09-02

## 2022-09-02 NOTE — TELEPHONE ENCOUNTER
Pt wife called and stated that pt needs a refill form for his Novalog sent to [facility] in order for him to continue getting his refills.    Thanks

## 2022-09-06 NOTE — TELEPHONE ENCOUNTER
Tried to call pt but phone is off. Assistance paperwork for Novolog was sent to [employer] today @ [phone]

## 2022-09-08 ENCOUNTER — DOCUMENTATION (OUTPATIENT)
Dept: ENDOCRINOLOGY | Facility: CLINIC | Age: 72
End: 2022-09-08

## 2022-09-08 NOTE — PROGRESS NOTES
PT ASSISTANCE FOR NOVOLOG SENT TO [employer] @ [phone]    CONFIRMATION RECEIVED  SENT TO MED REC

## 2022-09-13 ENCOUNTER — TELEPHONE (OUTPATIENT)
Dept: ENDOCRINOLOGY | Facility: CLINIC | Age: 72
End: 2022-09-13

## 2022-09-13 NOTE — TELEPHONE ENCOUNTER
Patients wife called wanting to know if his Novolog was delivered to our office? She said she was told it would be here.   She wants someone to call her back to discuss this issue.     Thanks

## 2022-09-27 ENCOUNTER — TELEPHONE (OUTPATIENT)
Dept: ENDOCRINOLOGY | Facility: CLINIC | Age: 72
End: 2022-09-27

## 2022-11-07 RX ORDER — CLOPIDOGREL BISULFATE 75 MG/1
75 TABLET ORAL DAILY
Qty: 90 TABLET | Refills: 3 | Status: SHIPPED | OUTPATIENT
Start: 2022-11-07

## 2022-11-07 RX ORDER — ATORVASTATIN CALCIUM 80 MG/1
80 TABLET, FILM COATED ORAL DAILY
Qty: 90 TABLET | Refills: 3 | Status: SHIPPED | OUTPATIENT
Start: 2022-11-07

## 2022-11-07 NOTE — TELEPHONE ENCOUNTER
Incoming Refill Request      Medication requested (name and dose):   clopidogrel (PLAVIX) 75 MG tablet  atorvastatin (LIPITOR) 80 MG tablet    Pharmacy where request should be sent:  [facility]     Additional details provided by patient:   Best call back number: [phone]    Does the patient have less than a 3 day supply:  [x] Yes  [] No    Ciarra Crystal Rep  11/07/22, 14:49 CST

## 2022-11-16 ENCOUNTER — LAB (OUTPATIENT)
Dept: LAB | Facility: HOSPITAL | Age: 72
End: 2022-11-16

## 2022-11-16 ENCOUNTER — OFFICE VISIT (OUTPATIENT)
Dept: FAMILY MEDICINE CLINIC | Facility: CLINIC | Age: 72
End: 2022-11-16

## 2022-11-16 VITALS
DIASTOLIC BLOOD PRESSURE: 68 MMHG | WEIGHT: 222 LBS | HEIGHT: 72 IN | SYSTOLIC BLOOD PRESSURE: 138 MMHG | BODY MASS INDEX: 30.07 KG/M2 | OXYGEN SATURATION: 96 % | HEART RATE: 66 BPM

## 2022-11-16 DIAGNOSIS — E11.65 TYPE 2 DIABETES MELLITUS WITH HYPERGLYCEMIA, WITH LONG-TERM CURRENT USE OF INSULIN: ICD-10-CM

## 2022-11-16 DIAGNOSIS — E55.9 VITAMIN D DEFICIENCY: ICD-10-CM

## 2022-11-16 DIAGNOSIS — K21.9 GASTROESOPHAGEAL REFLUX DISEASE WITHOUT ESOPHAGITIS: ICD-10-CM

## 2022-11-16 DIAGNOSIS — G50.0 TRIGEMINAL NEURALGIA: ICD-10-CM

## 2022-11-16 DIAGNOSIS — I10 ESSENTIAL HYPERTENSION: ICD-10-CM

## 2022-11-16 DIAGNOSIS — I10 ESSENTIAL HYPERTENSION: Primary | ICD-10-CM

## 2022-11-16 DIAGNOSIS — Z79.4 TYPE 2 DIABETES MELLITUS WITH HYPERGLYCEMIA, WITH LONG-TERM CURRENT USE OF INSULIN: ICD-10-CM

## 2022-11-16 PROCEDURE — 80053 COMPREHEN METABOLIC PANEL: CPT

## 2022-11-16 PROCEDURE — 99214 OFFICE O/P EST MOD 30 MIN: CPT | Performed by: FAMILY MEDICINE

## 2022-11-16 PROCEDURE — 80061 LIPID PANEL: CPT

## 2022-11-16 PROCEDURE — 82043 UR ALBUMIN QUANTITATIVE: CPT

## 2022-11-16 PROCEDURE — 36415 COLL VENOUS BLD VENIPUNCTURE: CPT

## 2022-11-16 PROCEDURE — 83036 HEMOGLOBIN GLYCOSYLATED A1C: CPT

## 2022-11-16 PROCEDURE — 82570 ASSAY OF URINE CREATININE: CPT

## 2022-11-16 PROCEDURE — 85025 COMPLETE CBC W/AUTO DIFF WBC: CPT

## 2022-11-16 PROCEDURE — 82306 VITAMIN D 25 HYDROXY: CPT

## 2022-11-16 RX ORDER — OMEPRAZOLE 40 MG/1
40 CAPSULE, DELAYED RELEASE ORAL DAILY
Qty: 90 CAPSULE | Refills: 3 | Status: SHIPPED | OUTPATIENT
Start: 2022-11-16

## 2022-11-16 RX ORDER — CYANOCOBALAMIN/FOLIC AC/VIT B6 1-2.2-25MG
1 TABLET ORAL DAILY
COMMUNITY
Start: 2022-10-05 | End: 2022-11-16 | Stop reason: SDUPTHER

## 2022-11-16 RX ORDER — LISINOPRIL 40 MG/1
TABLET ORAL
Qty: 180 TABLET | Refills: 0 | Status: SHIPPED | OUTPATIENT
Start: 2022-11-16 | End: 2023-02-17 | Stop reason: SDUPTHER

## 2022-11-16 NOTE — PROGRESS NOTES
Chief Complaint  Diabetes    Subjective    History of Present Illness {  Problem List  Visit  Diagnosis   Encounters  Notes  Medications  Labs  Result Review Imaging  Media :23}     Beka Zavaleta presents to [facility] [place] for     Chief Complaint   Patient presents with   • Diabetes      Patient seen today for follow up.  Has chronic medical problems including hypertension, type 2 diabetes, GERD and trigeminal neuralgia.   Hypertension and trigeminal neuralgia controlled with medication.  Has started following with endocrinology.  Current insulin regimen is Lantus 65-70 units at bedtime and Novolog with meals, 24 with breakfast and 20 with lunch and dinner.      Current Outpatient Medications:   •  amLODIPine (NORVASC) 10 MG tablet, Take 1 tablet by mouth Daily., Disp: 90 tablet, Rfl: 3  •  amoxicillin (AMOXIL) 500 MG capsule, Take 1 capsule by mouth 2 (Two) Times a Day., Disp: 20 capsule, Rfl: 0  •  aspirin 81 MG tablet, Take 81 mg by mouth daily., Disp: , Rfl:   •  atorvastatin (LIPITOR) 80 MG tablet, Take 1 tablet by mouth Daily., Disp: 90 tablet, Rfl: 3  •  carBAMazepine XR (TEGretol  XR) 100 MG 12 hr tablet, TAKE 4 TABS BY MOUTH IN THE MORNING, 3 TABS AT LUNCH AND 3 TABS AT BEDTIME *PA REQUESTED 3/14/22*, Disp: 300 tablet, Rfl: 5  •  clopidogrel (PLAVIX) 75 MG tablet, Take 1 tablet by mouth Daily., Disp: 90 tablet, Rfl: 3  •  colestipol (COLESTID) 1 g tablet, TAKE 2 TABLETS BY MOUTH 2 (TWO) TIMES A DAY., Disp: 120 tablet, Rfl: 3  •  Continuous Blood Gluc Sensor (FreeStyle Gabriella 14 Day Sensor) [facility], 1 each Every 14 (Fourteen) Days., Disp: 6 each, Rfl: 1  •  doxazosin (CARDURA) 4 MG tablet, TAKE 1 TABLET EVERY NIGHT, Disp: 90 tablet, Rfl: 3  •  Embrace Lancets Ultra Thin 30G, Use with testing blood sugars 3-4 times daily as instructed, Disp: 400 each, Rfl: 3  •  folic acid-vit B6-vit B12 (FOLGARD) 2.2-25-1 MG tablet tablet, Take 1 tablet by mouth  "Daily., Disp: , Rfl:   •  furosemide (LASIX) 20 MG tablet, TAKE ONE TABLET BY MOUTH DAILY FOR 3 DAYS THEN TAKE AS NEEDED FOR INCREASED SHORTNESS OF AIR EDEMA OR 2 3 LB WEIGHT GAIN IN 24 HOURS, Disp: , Rfl: 3  •  glucose blood test strip, Test Blood Sugars up to 4 times daily as instructed., Disp: 360 each, Rfl: 3  •  hydroCHLOROthiazide (HYDRODIURIL) 12.5 MG tablet, Take 1 tablet by mouth Daily., Disp: , Rfl:   •  insulin aspart (NovoLOG FlexPen) 100 UNIT/ML solution pen-injector sc pen, Up to 20 units TID before each meal, Disp: 10 pen, Rfl: 11  •  insulin glargine (Lantus) 100 UNIT/ML injection, Inject 185 Units under the skin into the appropriate area as directed Daily., Disp: 200 mL, Rfl: 3  •  Insulin Syringe-Needle U-100 30G X 7/16\" 1 ML misc, daily., Disp: , Rfl:   •  isosorbide mononitrate (IMDUR) 30 MG 24 hr tablet, Take 1 tablet by mouth Daily. Take 1/2 pill once a day (Patient taking differently: Take 30 mg by mouth Daily.), Disp: 30 tablet, Rfl: 5  •  lisinopril (PRINIVIL,ZESTRIL) 40 MG tablet, 1 tablet in the morning and .5 at night, Disp: 180 tablet, Rfl: 0  •  magnesium oxide (MAGOX) 400 (241.3 MG) MG tablet tablet, Take 400 mg by mouth Daily., Disp: , Rfl:   •  metFORMIN (GLUCOPHAGE) 500 MG tablet, Take 1 tablet by mouth 2 (Two) Times a Day With Meals., Disp: 180 tablet, Rfl: 3  •  metoprolol succinate XL (Toprol XL) 100 MG 24 hr tablet, Take 1 tablet by mouth Daily., Disp: 90 tablet, Rfl: 3  •  Multiple Vitamins-Minerals (ICAPS PO), Take  by mouth daily., Disp: , Rfl:   •  nitroglycerin (NITROSTAT) 0.4 MG SL tablet, Place 0.4 mg under the tongue As Needed for Chest Pain. repeat X 1 in 5 min. if not relieved and then got to ER or call an ambulance, Disp: , Rfl:   •  omeprazole (priLOSEC) 40 MG capsule, Take 1 capsule by mouth Daily., Disp: 90 capsule, Rfl: 3  •  potassium chloride (K-DUR) 10 MEQ CR tablet, TAKE ONE TABLET BY MOUTH DAILY FOR 3 DAYS AND THEN AS NEEDED WITH LASIX, Disp: , Rfl: 3  •  " "ranolazine (RANEXA) 500 MG 12 hr tablet, Take 1 tablet by mouth Every 12 (Twelve) Hours., Disp: , Rfl:   •  SITagliptin (Januvia) 100 MG tablet, Take 1 tablet by mouth Daily., Disp: 90 tablet, Rfl: 1  •  verapamil SR (CALAN-SR) 120 MG CR tablet, Take 240 mg by mouth., Disp: , Rfl:      Objective       Vital Signs:   /68   Pulse 66   Ht 182.9 cm (72\")   Wt 101 kg (222 lb)   SpO2 96%   BMI 30.11 kg/m²     Physical Exam  Vitals reviewed.   Constitutional:       General: He is not in acute distress.     Appearance: He is well-developed.   Cardiovascular:      Rate and Rhythm: Normal rate and regular rhythm.      Heart sounds: No murmur heard.  Pulmonary:      Effort: Pulmonary effort is normal. No respiratory distress.      Breath sounds: Normal breath sounds. No wheezing.   Skin:     General: Skin is warm and dry.   Neurological:      Mental Status: He is alert and oriented to person, place, and time.        Result Review :{ Labs  Result Review  Imaging  Med Tab  Media :23}   The following data was reviewed by: Gisela Stevenson MD on 11/16/2022    Common labs    Common Labs 7/25/22 7/25/22 7/25/22 7/25/22 7/25/22    0858 0858 0858 0858 0915   Glucose  255 (A)      BUN  29 (A)      Creatinine  1.33 (A)      Sodium  141      Potassium  6.0 (A)      Chloride  104      Calcium  10.0      Albumin  4.20      Total Bilirubin  0.3      Alkaline Phosphatase  81      AST (SGOT)  23      ALT (SGPT)  24      WBC 5.43       Hemoglobin 13.0       Hematocrit 37.9       Platelets 143       Total Cholesterol   164     Triglycerides   339 (A)     HDL Cholesterol   38 (A)     LDL Cholesterol    72     Hemoglobin A1C    8.80 (A)    Microalbumin, Urine     <1.2   (A) Abnormal value                    Assessment and Plan {CC Problem List  Visit Diagnosis  ROS  Review (Popup)  Health Maintenance  Quality  BestPractice  Medications  SmartSets  SnapShot Encounters  Media :23}   Diagnoses and all orders for this " visit:    1. Essential hypertension (Primary)  -     lisinopril (PRINIVIL,ZESTRIL) 40 MG tablet; 1 tablet in the morning and .5 at night  Dispense: 180 tablet; Refill: 0  -     Lipid Panel; Future  -     CBC & Differential; Future  -     Comprehensive Metabolic Panel; Future    2. Gastroesophageal reflux disease without esophagitis  -     omeprazole (priLOSEC) 40 MG capsule; Take 1 capsule by mouth Daily.  Dispense: 90 capsule; Refill: 3    3. Vitamin D deficiency  -     Vitamin D,25-Hydroxy; Future    4. Type 2 diabetes mellitus with hyperglycemia, with long-term current use of insulin (HCC)  -     Lipid Panel; Future  -     CBC & Differential; Future  -     Comprehensive Metabolic Panel; Future  -     Hemoglobin A1c; Future  -     Microalbumin / Creatinine Urine Ratio - Urine, Clean Catch; Future    5. Trigeminal neuralgia      Patient seen today for follow up  Hypertension, diabetes and trigeminal neuralgia controlled  Continue current mediation  Needed refills provided today  Check labs as above  Type 2 diabetes, still not well controlled  Continue following with endocrinology  Has continuous glucose monitor       Follow Up {Instructions Charge Capture  Follow-up Communications :23}   Return in about 8 months (around 7/17/2023) for Recheck.  Patient was given instructions and counseling regarding his condition or for health maintenance advice. Please see specific information pulled into the AVS if appropriate.            This document has been electronically signed by Gisela Stevenson MD

## 2022-11-17 LAB
25(OH)D3 SERPL-MCNC: 41.9 NG/ML (ref 30–100)
ALBUMIN SERPL-MCNC: 4.6 G/DL (ref 3.5–5.2)
ALBUMIN UR-MCNC: <1.2 MG/DL
ALBUMIN/GLOB SERPL: 1.8 G/DL
ALP SERPL-CCNC: 70 U/L (ref 39–117)
ALT SERPL W P-5'-P-CCNC: 25 U/L (ref 1–41)
ANION GAP SERPL CALCULATED.3IONS-SCNC: 15 MMOL/L (ref 5–15)
AST SERPL-CCNC: 25 U/L (ref 1–40)
BASOPHILS # BLD AUTO: 0.02 10*3/MM3 (ref 0–0.2)
BASOPHILS NFR BLD AUTO: 0.3 % (ref 0–1.5)
BILIRUB SERPL-MCNC: 0.3 MG/DL (ref 0–1.2)
BUN SERPL-MCNC: 22 MG/DL (ref 8–23)
BUN/CREAT SERPL: 19.6 (ref 7–25)
CALCIUM SPEC-SCNC: 10.2 MG/DL (ref 8.6–10.5)
CHLORIDE SERPL-SCNC: 103 MMOL/L (ref 98–107)
CHOLEST SERPL-MCNC: 167 MG/DL (ref 0–200)
CO2 SERPL-SCNC: 24 MMOL/L (ref 22–29)
CREAT SERPL-MCNC: 1.12 MG/DL (ref 0.76–1.27)
CREAT UR-MCNC: 101.1 MG/DL
DEPRECATED RDW RBC AUTO: 45.3 FL (ref 37–54)
EGFRCR SERPLBLD CKD-EPI 2021: 69.4 ML/MIN/1.73
EOSINOPHIL # BLD AUTO: 0.02 10*3/MM3 (ref 0–0.4)
EOSINOPHIL NFR BLD AUTO: 0.3 % (ref 0.3–6.2)
ERYTHROCYTE [DISTWIDTH] IN BLOOD BY AUTOMATED COUNT: 12.9 % (ref 12.3–15.4)
GLOBULIN UR ELPH-MCNC: 2.5 GM/DL
GLUCOSE SERPL-MCNC: 206 MG/DL (ref 65–99)
HBA1C MFR BLD: 9 % (ref 4.8–5.6)
HCT VFR BLD AUTO: 40.2 % (ref 37.5–51)
HDLC SERPL-MCNC: 44 MG/DL (ref 40–60)
HGB BLD-MCNC: 13.7 G/DL (ref 13–17.7)
IMM GRANULOCYTES # BLD AUTO: 0.02 10*3/MM3 (ref 0–0.05)
IMM GRANULOCYTES NFR BLD AUTO: 0.3 % (ref 0–0.5)
LDLC SERPL CALC-MCNC: 72 MG/DL (ref 0–100)
LDLC/HDLC SERPL: 1.35 {RATIO}
LYMPHOCYTES # BLD AUTO: 2.49 10*3/MM3 (ref 0.7–3.1)
LYMPHOCYTES NFR BLD AUTO: 39.7 % (ref 19.6–45.3)
MCH RBC QN AUTO: 32.5 PG (ref 26.6–33)
MCHC RBC AUTO-ENTMCNC: 34.1 G/DL (ref 31.5–35.7)
MCV RBC AUTO: 95.5 FL (ref 79–97)
MICROALBUMIN/CREAT UR: NORMAL MG/G{CREAT}
MONOCYTES # BLD AUTO: 0.54 10*3/MM3 (ref 0.1–0.9)
MONOCYTES NFR BLD AUTO: 8.6 % (ref 5–12)
NEUTROPHILS NFR BLD AUTO: 3.18 10*3/MM3 (ref 1.7–7)
NEUTROPHILS NFR BLD AUTO: 50.8 % (ref 42.7–76)
NRBC BLD AUTO-RTO: 0.2 /100 WBC (ref 0–0.2)
PLATELET # BLD AUTO: 130 10*3/MM3 (ref 140–450)
PMV BLD AUTO: 11.6 FL (ref 6–12)
POTASSIUM SERPL-SCNC: 4.6 MMOL/L (ref 3.5–5.2)
PROT SERPL-MCNC: 7.1 G/DL (ref 6–8.5)
RBC # BLD AUTO: 4.21 10*6/MM3 (ref 4.14–5.8)
SODIUM SERPL-SCNC: 142 MMOL/L (ref 136–145)
TRIGL SERPL-MCNC: 317 MG/DL (ref 0–150)
VLDLC SERPL-MCNC: 51 MG/DL (ref 5–40)
WBC NRBC COR # BLD: 6.27 10*3/MM3 (ref 3.4–10.8)

## 2022-12-05 ENCOUNTER — TELEPHONE (OUTPATIENT)
Dept: ENDOCRINOLOGY | Facility: CLINIC | Age: 72
End: 2022-12-05

## 2022-12-05 NOTE — TELEPHONE ENCOUNTER
Pt wife came to  stating pt needs a new RX for his freestyle maco sensors. She stated he gets these through the mail. (she was unsure of the pharmacy. I advised it looks like back in March in went to [facility] in [place] and she said no, they come in the mail.)    Please advise.    Thanks

## 2022-12-05 NOTE — TELEPHONE ENCOUNTER
Called to inform Talia that they get his maco through [facility] and she will need to contact them to get a refill at [phone]

## 2022-12-08 DIAGNOSIS — Z79.4 TYPE 2 DIABETES MELLITUS WITH HYPERGLYCEMIA, WITH LONG-TERM CURRENT USE OF INSULIN: ICD-10-CM

## 2022-12-08 DIAGNOSIS — E11.65 TYPE 2 DIABETES MELLITUS WITH HYPERGLYCEMIA, WITH LONG-TERM CURRENT USE OF INSULIN: ICD-10-CM

## 2022-12-12 DIAGNOSIS — R19.7 DIARRHEA, UNSPECIFIED TYPE: ICD-10-CM

## 2022-12-12 RX ORDER — MONTELUKAST SODIUM 4 MG/1
2 TABLET, CHEWABLE ORAL 2 TIMES DAILY
Qty: 120 TABLET | Refills: 3 | Status: SHIPPED | OUTPATIENT
Start: 2022-12-12

## 2022-12-20 DIAGNOSIS — G50.0 TRIGEMINAL NEURALGIA: ICD-10-CM

## 2022-12-20 RX ORDER — CARBAMAZEPINE 100 MG/1
TABLET, EXTENDED RELEASE ORAL
Qty: 300 TABLET | Refills: 5 | Status: SHIPPED | OUTPATIENT
Start: 2022-12-20

## 2022-12-20 NOTE — TELEPHONE ENCOUNTER
Incoming Refill Request      Medication requested (name and dose): carBAMazepine XR (TEGretol  XR) 100 MG 12 hr tablet    Pharmacy where request should be sent: [facility]    Additional details provided by patient: NONE    Best call back number: [phone]    Does the patient have less than a 3 day supply:  [x] Yes  [] No    Ciarra Villalpando Rep  12/20/22, 08:39 CST

## 2023-02-17 DIAGNOSIS — I10 ESSENTIAL HYPERTENSION: ICD-10-CM

## 2023-02-17 RX ORDER — LISINOPRIL 40 MG/1
TABLET ORAL
Qty: 180 TABLET | Refills: 3 | Status: SHIPPED | OUTPATIENT
Start: 2023-02-17

## 2023-02-27 DIAGNOSIS — G50.0 TRIGEMINAL NEURALGIA: ICD-10-CM

## 2023-02-28 ENCOUNTER — OFFICE VISIT (OUTPATIENT)
Dept: ENDOCRINOLOGY | Facility: CLINIC | Age: 73
End: 2023-02-28
Payer: MEDICARE

## 2023-02-28 ENCOUNTER — LAB (OUTPATIENT)
Dept: LAB | Facility: HOSPITAL | Age: 73
End: 2023-02-28
Payer: MEDICARE

## 2023-02-28 VITALS
SYSTOLIC BLOOD PRESSURE: 130 MMHG | HEIGHT: 72 IN | HEART RATE: 66 BPM | DIASTOLIC BLOOD PRESSURE: 80 MMHG | OXYGEN SATURATION: 98 % | WEIGHT: 220.5 LBS | BODY MASS INDEX: 29.87 KG/M2

## 2023-02-28 DIAGNOSIS — Z79.4 TYPE 2 DIABETES MELLITUS WITH HYPERGLYCEMIA, WITH LONG-TERM CURRENT USE OF INSULIN: Primary | ICD-10-CM

## 2023-02-28 DIAGNOSIS — E11.65 TYPE 2 DIABETES MELLITUS WITH HYPERGLYCEMIA, WITH LONG-TERM CURRENT USE OF INSULIN: Primary | ICD-10-CM

## 2023-02-28 DIAGNOSIS — E53.8 VITAMIN B 12 DEFICIENCY: ICD-10-CM

## 2023-02-28 DIAGNOSIS — I10 ESSENTIAL HYPERTENSION: ICD-10-CM

## 2023-02-28 DIAGNOSIS — E78.2 MIXED HYPERLIPIDEMIA: ICD-10-CM

## 2023-02-28 LAB
ALBUMIN SERPL-MCNC: 4.3 G/DL (ref 3.5–5.2)
ALBUMIN/GLOB SERPL: 1.4 G/DL
ALP SERPL-CCNC: 70 U/L (ref 39–117)
ALT SERPL W P-5'-P-CCNC: 13 U/L (ref 1–41)
ANION GAP SERPL CALCULATED.3IONS-SCNC: 10 MMOL/L (ref 5–15)
AST SERPL-CCNC: 16 U/L (ref 1–40)
BASOPHILS # BLD AUTO: 0.04 10*3/MM3 (ref 0–0.2)
BASOPHILS NFR BLD AUTO: 0.6 % (ref 0–1.5)
BILIRUB SERPL-MCNC: 0.4 MG/DL (ref 0–1.2)
BUN SERPL-MCNC: 24 MG/DL (ref 8–23)
BUN/CREAT SERPL: 19.8 (ref 7–25)
CALCIUM SPEC-SCNC: 9.7 MG/DL (ref 8.6–10.5)
CHLORIDE SERPL-SCNC: 100 MMOL/L (ref 98–107)
CHOLEST SERPL-MCNC: 143 MG/DL (ref 0–200)
CO2 SERPL-SCNC: 27 MMOL/L (ref 22–29)
CREAT SERPL-MCNC: 1.21 MG/DL (ref 0.76–1.27)
DEPRECATED RDW RBC AUTO: 43.4 FL (ref 37–54)
EGFRCR SERPLBLD CKD-EPI 2021: 62.8 ML/MIN/1.73
EOSINOPHIL # BLD AUTO: 0.04 10*3/MM3 (ref 0–0.4)
EOSINOPHIL NFR BLD AUTO: 0.6 % (ref 0.3–6.2)
ERYTHROCYTE [DISTWIDTH] IN BLOOD BY AUTOMATED COUNT: 12.9 % (ref 12.3–15.4)
GLOBULIN UR ELPH-MCNC: 3 GM/DL
GLUCOSE SERPL-MCNC: 212 MG/DL (ref 65–99)
HBA1C MFR BLD: 9.7 % (ref 4.8–5.6)
HCT VFR BLD AUTO: 40.5 % (ref 37.5–51)
HDLC SERPL-MCNC: 40 MG/DL (ref 40–60)
HGB BLD-MCNC: 13.7 G/DL (ref 13–17.7)
IMM GRANULOCYTES # BLD AUTO: 0.02 10*3/MM3 (ref 0–0.05)
IMM GRANULOCYTES NFR BLD AUTO: 0.3 % (ref 0–0.5)
LDLC SERPL CALC-MCNC: 70 MG/DL (ref 0–100)
LDLC/HDLC SERPL: 1.6 {RATIO}
LYMPHOCYTES # BLD AUTO: 2.38 10*3/MM3 (ref 0.7–3.1)
LYMPHOCYTES NFR BLD AUTO: 35.5 % (ref 19.6–45.3)
MCH RBC QN AUTO: 31.4 PG (ref 26.6–33)
MCHC RBC AUTO-ENTMCNC: 33.8 G/DL (ref 31.5–35.7)
MCV RBC AUTO: 92.7 FL (ref 79–97)
MONOCYTES # BLD AUTO: 0.58 10*3/MM3 (ref 0.1–0.9)
MONOCYTES NFR BLD AUTO: 8.6 % (ref 5–12)
NEUTROPHILS NFR BLD AUTO: 3.65 10*3/MM3 (ref 1.7–7)
NEUTROPHILS NFR BLD AUTO: 54.4 % (ref 42.7–76)
NRBC BLD AUTO-RTO: 0 /100 WBC (ref 0–0.2)
PLATELET # BLD AUTO: 149 10*3/MM3 (ref 140–450)
PMV BLD AUTO: 11.4 FL (ref 6–12)
POTASSIUM SERPL-SCNC: 4.8 MMOL/L (ref 3.5–5.2)
PROT SERPL-MCNC: 7.3 G/DL (ref 6–8.5)
RBC # BLD AUTO: 4.37 10*6/MM3 (ref 4.14–5.8)
SODIUM SERPL-SCNC: 137 MMOL/L (ref 136–145)
TRIGL SERPL-MCNC: 196 MG/DL (ref 0–150)
TSH SERPL DL<=0.05 MIU/L-ACNC: 0.56 UIU/ML (ref 0.27–4.2)
VLDLC SERPL-MCNC: 33 MG/DL (ref 5–40)
WBC NRBC COR # BLD: 6.71 10*3/MM3 (ref 3.4–10.8)

## 2023-02-28 PROCEDURE — 95251 CONT GLUC MNTR ANALYSIS I&R: CPT | Performed by: NURSE PRACTITIONER

## 2023-02-28 PROCEDURE — 80053 COMPREHEN METABOLIC PANEL: CPT | Performed by: NURSE PRACTITIONER

## 2023-02-28 PROCEDURE — 36415 COLL VENOUS BLD VENIPUNCTURE: CPT | Performed by: NURSE PRACTITIONER

## 2023-02-28 PROCEDURE — 85025 COMPLETE CBC W/AUTO DIFF WBC: CPT | Performed by: NURSE PRACTITIONER

## 2023-02-28 PROCEDURE — 80061 LIPID PANEL: CPT | Performed by: NURSE PRACTITIONER

## 2023-02-28 PROCEDURE — 83036 HEMOGLOBIN GLYCOSYLATED A1C: CPT | Performed by: NURSE PRACTITIONER

## 2023-02-28 PROCEDURE — 82043 UR ALBUMIN QUANTITATIVE: CPT | Performed by: NURSE PRACTITIONER

## 2023-02-28 PROCEDURE — 99214 OFFICE O/P EST MOD 30 MIN: CPT | Performed by: NURSE PRACTITIONER

## 2023-02-28 PROCEDURE — 82570 ASSAY OF URINE CREATININE: CPT | Performed by: NURSE PRACTITIONER

## 2023-02-28 PROCEDURE — 84443 ASSAY THYROID STIM HORMONE: CPT | Performed by: NURSE PRACTITIONER

## 2023-02-28 NOTE — PROGRESS NOTES
"Chief Complaint  Diabetes    Subjective          Beka Zavaleta presents to [facility]  Diabetes         In office visit      Referring provider Gisela Stevenson MD      72 year old male presents for follow up       Reason -- diabetes mellitus type 2      Duration-- diagnosed before 1990      Context --- presented for work up for another problem and found to have diabetes      Timing constant      Quality  Not controlled      Severity moderate        Microvascular complications --mild neuropathy, no DR , no renal disease      Current diabetes regimen         Insulin, oral medication      Current glucose monitoring      fingerstick's      Checks 4 times daily      History of low sugars     Using maco        See below        Review of Systems - General ROS: negative        Objective   Vital Signs:   /80   Pulse 66   Ht 182.9 cm (72\")   Wt 100 kg (220 lb 8 oz)   SpO2 98%   BMI 29.91 kg/m²     Physical Exam  Constitutional:       Appearance: Normal appearance.   Cardiovascular:      Rate and Rhythm: Regular rhythm.      Heart sounds: Normal heart sounds.   Pulmonary:      Breath sounds: Normal breath sounds.   Musculoskeletal:      Cervical back: Normal range of motion.   Neurological:      Mental Status: He is alert.        Result Review :   The following data was reviewed by: EMMANUELLE Wallace on 04/15/2022:  Common labs    Common Labs 7/25/22 7/25/22 7/25/22 7/25/22 7/25/22 11/16/22 11/16/22 11/16/22 11/16/22 11/16/22 2/13/23    0858 0858 0858 0858 0915 0934 0934 0934 0934 0938    Glucose  255 (A)       206 (A)     Glucose           329 (A)   BUN  29 (A)       22  23 (A)   Creatinine  1.33 (A)       1.12  1.3   Sodium  141       142  140   Potassium  6.0 (A)       4.6  4.3   Chloride  104       103  102   Calcium  10.0       10.2  9.2   Albumin  4.20       4.60  3.6   Total Bilirubin  0.3       0.3  0.40   Alkaline Phosphatase  81       70  81   AST " (SGOT)  23       25  16   ALT (SGPT)  24       25  19   WBC 5.43     6.27        Hemoglobin 13.0     13.7        Hematocrit 37.9     40.2        Platelets 143     130 (A)        Total Cholesterol   164     167      Triglycerides   339 (A)     317 (A)      HDL Cholesterol   38 (A)     44      LDL Cholesterol    72     72      Hemoglobin A1C    8.80 (A)   9.00 (A)       Microalbumin, Urine     <1.2     <1.2    (A) Abnormal value                        Assessment and Plan    Diagnoses and all orders for this visit:    1. Type 2 diabetes mellitus with hyperglycemia, with long-term current use of insulin (HCC) (Primary)  -     CBC & Differential  -     Comprehensive Metabolic Panel  -     Hemoglobin A1c  -     Lipid Panel  -     Microalbumin / Creatinine Urine Ratio - Urine, Clean Catch  -     TSH  -     SITagliptin (Januvia) 100 MG tablet; Take 1 tablet by mouth Daily.  Dispense: 90 tablet; Refill: 1    2. Essential hypertension  -     CBC & Differential  -     Comprehensive Metabolic Panel  -     Hemoglobin A1c  -     Lipid Panel  -     Microalbumin / Creatinine Urine Ratio - Urine, Clean Catch  -     TSH    3. Vitamin B 12 deficiency  -     CBC & Differential  -     Comprehensive Metabolic Panel  -     Hemoglobin A1c  -     Lipid Panel  -     Microalbumin / Creatinine Urine Ratio - Urine, Clean Catch  -     TSH    4. Mixed hyperlipidemia  -     CBC & Differential  -     Comprehensive Metabolic Panel  -     Hemoglobin A1c  -     Lipid Panel  -     Microalbumin / Creatinine Urine Ratio - Urine, Clean Catch  -     TSH           Glycemic Management:     Diabetes mellitus type 2      Lab Results   Component Value Date    HGBA1C 9.00 (H) 11/16/2022       Side effects from Trulicity and jardiance       Gabriella             Ambulatory Glucose Profile Report    Days Analyzed : 2 week period ending on 02/28/23      Continuous Glucose Monitory Device:  FreeStyle Gabriella 2     - 28% very high target range  - 52% high target  range  - 20% in target range  - 0% below target range  - GMI 8.5 %  - Average glucose 219  mg/dl    Interpretation : Diabetes Type 2      hyperglycemia     Snacking before bed , high carb     Discussed cutting the carb before bed      No daniel in lantus     Increase mealtime insulin        Taking metformin 500 mg 2 daily --keep      Taking januvia 100 mg --keep          Lantus 70 units once daily       novolog for meals TID      4 units per 15 grams of CHO            For small carb meal give  12 units      For regular carb meal give 16 units      For large carb meal give  24 units for breakfast --give up to 28         Sliding scale      151-200     2 units   201-250     4 units   251-300    6 units   Above 301   8 units                           Goals for sugar     Fasting and before meals 80 to 130     2 hours after meals 180 or less        Aim for 60  grams of carbohydrate per meal     Aim for 15 grams of carbohydrate per snack                         Microvascular Complications Monitoring         Last eye exam--yearly , no  , August 2022      Neuropathy --mild         Lipid Management:               Lab Results   Component Value Date     LDL 77 03/08/2022               Taking lipitor 80 mg one at night     Taking fish oil            Blood Pressure Management:        Taking norvasc 10 mg daily            Thyroid Health           Lab Results   Component Value Date     TSH 0.574 11/26/2019               Bone Health               Lab Results   Component Value Date     CALCIUM 10.2 03/08/2022     PHOS 3.7 11/12/2015          taking vitamin d       Now taking vitamin b 12          Weight Management:        Overweight      Body mass index is 29.91 kg/m².       Preventive Care:      Non smoker                              Follow Up   Return in about 3 months (around 5/28/2023) for Recheck.  Patient was given instructions and counseling regarding his condition or for health maintenance advice. Please see specific  information pulled into the AVS if appropriate.         This document has been electronically signed by EMMANUELLE Wallace on February 28, 2023 11:53 CST.

## 2023-03-01 ENCOUNTER — TELEPHONE (OUTPATIENT)
Dept: ENDOCRINOLOGY | Facility: CLINIC | Age: 73
End: 2023-03-01
Payer: MEDICARE

## 2023-03-01 LAB
ALBUMIN UR-MCNC: <1.2 MG/DL
CREAT UR-MCNC: 136.4 MG/DL
MICROALBUMIN/CREAT UR: NORMAL MG/G{CREAT}

## 2023-03-01 NOTE — TELEPHONE ENCOUNTER
----- Message from EMMANUELLE Wallace sent at 3/1/2023  9:23 AM CST -----  Bad cholesterol is at goal at 70 ; A1c was 9.7% , urine is neg for protein , thyroid normal

## 2023-03-02 ENCOUNTER — DOCUMENTATION (OUTPATIENT)
Dept: ENDOCRINOLOGY | Facility: CLINIC | Age: 73
End: 2023-03-02
Payer: MEDICARE

## 2023-04-07 DIAGNOSIS — R19.7 DIARRHEA, UNSPECIFIED TYPE: ICD-10-CM

## 2023-04-07 RX ORDER — MONTELUKAST SODIUM 4 MG/1
2 TABLET, CHEWABLE ORAL 2 TIMES DAILY
Qty: 120 TABLET | Refills: 3 | Status: SHIPPED | OUTPATIENT
Start: 2023-04-07

## 2023-05-01 ENCOUNTER — TELEPHONE (OUTPATIENT)
Dept: ENDOCRINOLOGY | Facility: CLINIC | Age: 73
End: 2023-05-01
Payer: MEDICARE

## 2023-05-01 DIAGNOSIS — E11.65 TYPE 2 DIABETES MELLITUS WITH HYPERGLYCEMIA, WITH LONG-TERM CURRENT USE OF INSULIN: ICD-10-CM

## 2023-05-01 DIAGNOSIS — Z79.4 TYPE 2 DIABETES MELLITUS WITH HYPERGLYCEMIA, WITH LONG-TERM CURRENT USE OF INSULIN: ICD-10-CM

## 2023-05-01 RX ORDER — INSULIN ASPART 100 [IU]/ML
INJECTION, SOLUTION INTRAVENOUS; SUBCUTANEOUS
Qty: 30 ML | Refills: 3 | Status: SHIPPED | OUTPATIENT
Start: 2023-05-01

## 2023-05-01 NOTE — TELEPHONE ENCOUNTER
Pt wife called and states they have switched pharmacies to [facility] in [place] and pt is needing a new RX for Novolog Flex pen to be called in.    Please advise.    Thanks

## 2023-06-23 PROBLEM — N17.9 AKI (ACUTE KIDNEY INJURY): Status: ACTIVE | Noted: 2023-06-23

## 2023-07-14 ENCOUNTER — OFFICE VISIT (OUTPATIENT)
Dept: FAMILY MEDICINE CLINIC | Facility: CLINIC | Age: 73
End: 2023-07-14
Payer: MEDICARE

## 2023-07-14 VITALS
SYSTOLIC BLOOD PRESSURE: 130 MMHG | WEIGHT: 219 LBS | OXYGEN SATURATION: 97 % | HEART RATE: 68 BPM | HEIGHT: 72 IN | DIASTOLIC BLOOD PRESSURE: 70 MMHG | BODY MASS INDEX: 29.66 KG/M2

## 2023-07-14 DIAGNOSIS — I10 ESSENTIAL HYPERTENSION: ICD-10-CM

## 2023-07-14 DIAGNOSIS — Z00.00 MEDICARE ANNUAL WELLNESS VISIT, SUBSEQUENT: Primary | ICD-10-CM

## 2023-07-14 DIAGNOSIS — E11.65 TYPE 2 DIABETES MELLITUS WITH HYPERGLYCEMIA, WITH LONG-TERM CURRENT USE OF INSULIN: ICD-10-CM

## 2023-07-14 DIAGNOSIS — Z79.4 TYPE 2 DIABETES MELLITUS WITH HYPERGLYCEMIA, WITH LONG-TERM CURRENT USE OF INSULIN: ICD-10-CM

## 2023-07-14 DIAGNOSIS — H91.93 HEARING DIFFICULTY OF BOTH EARS: ICD-10-CM

## 2023-07-14 PROCEDURE — 3075F SYST BP GE 130 - 139MM HG: CPT | Performed by: FAMILY MEDICINE

## 2023-07-14 PROCEDURE — G0439 PPPS, SUBSEQ VISIT: HCPCS | Performed by: FAMILY MEDICINE

## 2023-07-14 PROCEDURE — 3078F DIAST BP <80 MM HG: CPT | Performed by: FAMILY MEDICINE

## 2023-07-14 PROCEDURE — 3052F HG A1C>EQUAL 8.0%<EQUAL 9.0%: CPT | Performed by: FAMILY MEDICINE

## 2023-07-14 RX ORDER — CLOPIDOGREL BISULFATE 75 MG/1
75 TABLET ORAL DAILY
Qty: 90 TABLET | Refills: 3 | Status: SHIPPED | OUTPATIENT
Start: 2023-07-14

## 2023-07-14 RX ORDER — RANOLAZINE 1000 MG/1
1000 TABLET, EXTENDED RELEASE ORAL
COMMUNITY
Start: 2023-07-07 | End: 2023-07-14

## 2023-07-14 RX ORDER — METOPROLOL SUCCINATE 100 MG/1
100 TABLET, EXTENDED RELEASE ORAL DAILY
Qty: 90 TABLET | Refills: 3 | Status: SHIPPED | OUTPATIENT
Start: 2023-07-14

## 2023-07-14 NOTE — PROGRESS NOTES
The ABCs of the Annual Wellness Visit  Subsequent Medicare Wellness Visit    Subjective      Beka Zavaleta is a 72 y.o. male who presents for a Subsequent Medicare Wellness Visit.    The following portions of the patient's history were reviewed and   updated as appropriate: allergies, current medications, past family history, past medical history, past social history, past surgical history, and problem list.    Compared to one year ago, the patient feels his physical   health is the same.    Compared to one year ago, the patient feels his mental   health is the same.    Recent Hospitalizations:  This patient has had a [facility] admission record on file within the last 365 days.  Admitted 6/23/23 - 6/24/23 for acute kidney injury.     Current Medical Providers:  Patient Care Team:  Gisela Stevenson MD as PCP - General (Family Medicine)  Riley Osorio MD as Surgeon (General Surgery)  Mario Smart MD as Surgeon (Orthopedic Surgery)  Tam Daley MD as Consulting Physician (Ophthalmology)  Ej Hatch MD as Consulting Physician (Interventional Cardiology)  Chao Berrios APRN as Nurse Practitioner (Endocrinology)    Outpatient Medications Prior to Visit   Medication Sig Dispense Refill    amLODIPine (NORVASC) 10 MG tablet Take 1 tablet by mouth Daily. 90 tablet 3    aspirin 81 MG tablet Take 1 tablet by mouth Daily.      atorvastatin (LIPITOR) 80 MG tablet Take 1 tablet by mouth Daily. 90 tablet 3    carBAMazepine XR (TEGretol  XR) 100 MG 12 hr tablet TAKE 4 TABS BY MOUTH IN THE MORNING, 3 TABS AT LUNCH AND 3 TABS AT BEDTIME *PA REQUESTED 3/14/22* (Patient taking differently: 5 tablets. TAKE 5 TAB DAILY 3 IN THE MORNING AND 2 IN THE AFTERNOON) 300 tablet 5    cholecalciferol (VITAMIN D3) 25 MCG (1000 UT) tablet Take 1 tablet by mouth Daily.      colestipol (COLESTID) 1 g tablet TAKE 2 TABLETS BY MOUTH 2 (TWO) TIMES A DAY. 120 tablet 3    Continuous Blood Gluc Sensor  "(FreeStyle Gabriella 14 Day Sensor) misc 1 each Every 14 (Fourteen) Days. 6 each 1    doxazosin (CARDURA) 4 MG tablet TAKE 1 TABLET EVERY NIGHT 90 tablet 3    Embrace Lancets Ultra Thin 30G Use with testing blood sugars 3-4 times daily as instructed 400 each 3    folic acid-vit B6-vit B12 (FOLGARD) 2.2-25-1 MG tablet tablet Take 1 tablet by mouth Daily.      furosemide (LASIX) 20 MG tablet TAKE ONE TABLET BY MOUTH DAILY FOR 3 DAYS THEN TAKE AS NEEDED FOR INCREASED SHORTNESS OF AIR EDEMA OR 2 3 LB WEIGHT GAIN IN 24 HOURS  3    glucose blood test strip Test Blood Sugars up to 4 times daily as instructed. 360 each 3    hydroCHLOROthiazide (HYDRODIURIL) 12.5 MG tablet Take 1 tablet by mouth Daily.      insulin aspart (NovoLOG FlexPen) 100 UNIT/ML solution pen-injector sc pen Up to 20 units TID before each meal 30 mL 3    insulin glargine (Lantus) 100 UNIT/ML injection Inject 70 Units under the skin into the appropriate area as directed Daily.      Insulin Syringe-Needle U-100 30G X 7/16\" 1 ML misc daily.      isosorbide mononitrate (IMDUR) 30 MG 24 hr tablet Take 1 tablet by mouth Daily. Take 1/2 pill once a day 45 tablet 3    lisinopril (PRINIVIL,ZESTRIL) 40 MG tablet 1 tablet in the morning and .5 at night 180 tablet 3    magnesium oxide (MAGOX) 400 (241.3 MG) MG tablet tablet Take 1 tablet by mouth Daily.      metFORMIN (GLUCOPHAGE) 500 MG tablet TAKE 1 TABLET BY MOUTH 2 (TWO) TIMES A DAY WITH MEALS. *EQUIPP/HUMANA* 180 tablet 3    Multiple Vitamins-Minerals (ICAPS PO) Take  by mouth daily.      nitroglycerin (NITROSTAT) 0.4 MG SL tablet Place 1 tablet under the tongue As Needed for Chest Pain. repeat X 1 in 5 min. if not relieved and then got to ER or call an ambulance      omeprazole (priLOSEC) 40 MG capsule Take 1 capsule by mouth Daily. 90 capsule 3    potassium chloride (K-DUR) 10 MEQ CR tablet TAKE ONE TABLET BY MOUTH DAILY FOR 3 DAYS AND THEN AS NEEDED WITH LASIX  3    ranolazine (RANEXA) 500 MG 12 hr tablet Take " 2 tablets by mouth 2 (Two) Times a Day.      SITagliptin (Januvia) 100 MG tablet Take 1 tablet by mouth Daily. 90 tablet 1    verapamil SR (CALAN-SR) 120 MG CR tablet Take 3 tablets by mouth.      clopidogrel (PLAVIX) 75 MG tablet Take 1 tablet by mouth Daily. 90 tablet 3    metoprolol succinate XL (Toprol XL) 100 MG 24 hr tablet Take 1 tablet by mouth Daily. 90 tablet 3    ranolazine (RANEXA) 1000 MG 12 hr tablet Take 1 tablet by mouth. (Patient not taking: Reported on 7/14/2023)       No facility-administered medications prior to visit.       No opioid medication identified on active medication list. I have reviewed chart for other potential  high risk medication/s and harmful drug interactions in the elderly.        Aspirin is on active medication list. Aspirin use is indicated based on review of current medical condition/s. Pros and cons of this therapy have been discussed today. Benefits of this medication outweigh potential harm.  Patient has been encouraged to continue taking this medication.  .      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, mild stage    Hepatic steatosis    Nuclear cataract    Cortical age-related cataract    Palpitation    Cellulitis of right hand    Cellulitis of finger of right hand    Vitamin deficiency    Type 2 diabetes mellitus with hyperglycemia, with long-term current use of insulin    Plantar fasciitis    Malaise and fatigue    Hyperkalemia    History of Holter monitoring    History of echocardiogram    Goiter    Medicare annual wellness visit, subsequent    Diverticular disease of colon    Chronic left shoulder pain     "Type 2 diabetes mellitus without complication, without long-term current use of insulin    Rotator cuff syndrome    Impingement syndrome of left shoulder    Encounter for colonoscopy due to history of adenomatous colonic polyps    Vitamin B 12 deficiency    Mixed hyperlipidemia    GISELLE (acute kidney injury)     Advance Care Planning   Advance Care Planning     Advance Directive is not on file.  ACP discussion was held with the patient during this visit. Patient does not have an advance directive, information provided.     Objective    Vitals:    23 0906   BP: 130/70   Pulse: 68   SpO2: 97%   Weight: 99.3 kg (219 lb)   Height: 182.9 cm (72\")   PainSc: 0-No pain     Estimated body mass index is 29.7 kg/m² as calculated from the following:    Height as of this encounter: 182.9 cm (72\").    Weight as of this encounter: 99.3 kg (219 lb).    BMI is >= 25 and <30. (Overweight) The following options were offered after discussion;: exercise counseling/recommendations and nutrition counseling/recommendations      Does the patient have evidence of cognitive impairment?   No    Lab Results   Component Value Date    TRIG 267 (H) 2023    HDL 38 (L) 2023    LDL 85 2023    VLDL 44 (H) 2023    HGBA1C 8.10 (H) 2023          HEALTH RISK ASSESSMENT    Smoking Status:  Social History     Tobacco Use   Smoking Status Former    Types: Cigarettes    Quit date: 1988    Years since quittin.5    Passive exposure: Never   Smokeless Tobacco Former    Types: Chew     Alcohol Consumption:  Social History     Substance and Sexual Activity   Alcohol Use No     Fall Risk Screen:    STEADI Fall Risk Assessment was completed, and patient is at LOW risk for falls.Assessment completed on:2023    Depression Screenin/14/2023     9:12 AM   PHQ-2/PHQ-9 Depression Screening   Little Interest or Pleasure in Doing Things 0-->not at all   Feeling Down, Depressed or Hopeless 0-->not at all   PHQ-9: Brief " Depression Severity Measure Score 0       Health Habits and Functional and Cognitive Screenin/14/2023     9:00 AM   Functional & Cognitive Status   Do you have difficulty preparing food and eating? No   Do you have difficulty bathing yourself, getting dressed or grooming yourself? No   Do you have difficulty using the toilet? No   Do you have difficulty moving around from place to place? No   Do you have trouble with steps or getting out of a bed or a chair? No   Current Diet Well Balanced Diet   Dental Exam Up to date   Eye Exam Up to date   Exercise (times per week) 0 times per week   Do you need help using the phone?  No   Are you deaf or do you have serious difficulty hearing?  No   Do you need help with transportation? No   Do you need help shopping? No   Do you need help preparing meals?  No   Do you need help with housework?  No   Do you need help with laundry? No   Do you need help taking your medications? No   Do you need help managing money? No   Do you ever drive or ride in a car without wearing a seat belt? No   Have you felt unusual stress, anger or loneliness in the last month? No   Who do you live with? Spouse   If you need help, do you have trouble finding someone available to you? No   Have you been bothered in the last four weeks by sexual problems? No   Do you have difficulty concentrating, remembering or making decisions? No       Age-appropriate Screening Schedule:  Refer to the list below for future screening recommendations based on patient's age, sex and/or medical conditions. Orders for these recommended tests are listed in the plan section. The patient has been provided with a written plan.    Health Maintenance   Topic Date Due    TDAP/TD VACCINES (2 - Td or Tdap) 2020    DIABETIC FOOT EXAM  2021    COVID-19 Vaccine (5 - Pfizer series) 2023    ANNUAL WELLNESS VISIT  2023    DIABETIC EYE EXAM  2023    INFLUENZA VACCINE  10/01/2023    HEMOGLOBIN A1C   12/20/2023    COLORECTAL CANCER SCREENING  04/19/2024    LIPID PANEL  06/20/2024    URINE MICROALBUMIN  06/20/2024    HEPATITIS C SCREENING  Completed    Pneumococcal Vaccine 65+  Completed    AAA SCREEN (ONE-TIME)  Completed    ZOSTER VACCINE  Completed                  CMS Preventative Services Quick Reference  Risk Factors Identified During Encounter:    Hearing Problem: Referral to Audiologist ordered  Inactivity/Sedentary:  Encouraged gradual increase in physical activity    The above risks/problems have been discussed with the patient.  Pertinent information has been shared with the patient in the After Visit Summary.    Diagnoses and all orders for this visit:    1. Medicare annual wellness visit, subsequent (Primary)    2. Essential hypertension  -     metoprolol succinate XL (Toprol XL) 100 MG 24 hr tablet; Take 1 tablet by mouth Daily.  Dispense: 90 tablet; Refill: 3  -     Comprehensive Metabolic Panel; Future    3. Type 2 diabetes mellitus with hyperglycemia, with long-term current use of insulin    4. Hearing difficulty of both ears  -     Ambulatory Referral to Audiology    Other orders  -     clopidogrel (PLAVIX) 75 MG tablet; Take 1 tablet by mouth Daily.  Dispense: 90 tablet; Refill: 3      Medicare wellness visit completed  Hypertension controlled, continue current medications  Check CMP  Type 2 diabetes, still hyperglycemia  Continue following with endocrinology  Referral to audiology for hearing evaluation      Follow Up:   Return in about 4 months (around 11/14/2023) for Recheck.   Next Medicare Wellness visit to be scheduled in 1 year.      An After Visit Summary and PPPS were made available to the patient.          This document has been electronically signed by Gisela Stevenson MD

## 2023-07-21 ENCOUNTER — LAB (OUTPATIENT)
Dept: LAB | Facility: HOSPITAL | Age: 73
End: 2023-07-21
Payer: MEDICARE

## 2023-07-21 DIAGNOSIS — I10 ESSENTIAL HYPERTENSION: ICD-10-CM

## 2023-07-21 LAB
ALBUMIN SERPL-MCNC: 4.1 G/DL (ref 3.5–5.2)
ALBUMIN/GLOB SERPL: 1.7 G/DL
ALP SERPL-CCNC: 61 U/L (ref 39–117)
ALT SERPL W P-5'-P-CCNC: 14 U/L (ref 1–41)
ANION GAP SERPL CALCULATED.3IONS-SCNC: 10.7 MMOL/L (ref 5–15)
AST SERPL-CCNC: 13 U/L (ref 1–40)
BILIRUB SERPL-MCNC: 0.3 MG/DL (ref 0–1.2)
BUN SERPL-MCNC: 22 MG/DL (ref 8–23)
BUN/CREAT SERPL: 18.3 (ref 7–25)
CALCIUM SPEC-SCNC: 9.5 MG/DL (ref 8.6–10.5)
CHLORIDE SERPL-SCNC: 106 MMOL/L (ref 98–107)
CO2 SERPL-SCNC: 24.3 MMOL/L (ref 22–29)
CREAT SERPL-MCNC: 1.2 MG/DL (ref 0.76–1.27)
EGFRCR SERPLBLD CKD-EPI 2021: 63.5 ML/MIN/1.73
GLOBULIN UR ELPH-MCNC: 2.4 GM/DL
GLUCOSE SERPL-MCNC: 201 MG/DL (ref 65–99)
POTASSIUM SERPL-SCNC: 5.5 MMOL/L (ref 3.5–5.2)
PROT SERPL-MCNC: 6.5 G/DL (ref 6–8.5)
SODIUM SERPL-SCNC: 141 MMOL/L (ref 136–145)

## 2023-07-21 PROCEDURE — 80053 COMPREHEN METABOLIC PANEL: CPT

## 2023-07-21 PROCEDURE — 36415 COLL VENOUS BLD VENIPUNCTURE: CPT

## 2023-07-25 ENCOUNTER — TELEPHONE (OUTPATIENT)
Dept: FAMILY MEDICINE CLINIC | Facility: CLINIC | Age: 73
End: 2023-07-25
Payer: MEDICARE

## 2023-07-25 DIAGNOSIS — I10 ESSENTIAL HYPERTENSION: Primary | ICD-10-CM

## 2023-07-25 RX ORDER — HYDROCHLOROTHIAZIDE 25 MG/1
25 TABLET ORAL DAILY
Qty: 30 TABLET | Refills: 2 | Status: SHIPPED | OUTPATIENT
Start: 2023-07-25

## 2023-07-25 NOTE — PROGRESS NOTES
Per Dr. Stevenson, Ms. Zavaleta has been called with recent lab results & recommendations.  Continue current medications and follow-up as planned or sooner if any problems.     No, he is not taking Lasix and Potassium  She will have him take 1/2 tablet of Lisinopril 40 mg in the AM and 1/4 tablet of Lisinopril in the evening  She will have him take 2 of his HCTZ 12.5 mg tablets.  She will have him skye his labs in 1-2 weeks.     She did not have any additional questions at this time

## 2023-07-25 NOTE — TELEPHONE ENCOUNTER
Per Dr. Stevenson, Ms. Zavaleta has been called with recent lab results & recommendations.  Continue current medications and follow-up as planned or sooner if any problems.     No, he is not taking Lasix and Potassium  She will have him take 1/2 tablet of Lisinopril 40 mg in the AM and 1/4 tablet of Lisinopril in the evening  She will have him take 2 of his HCTZ 12.5 mg tablets.  She will have him skye his labs in 1-2 weeks.     She did not have any additional questions at this time      ----- Message from Gisela Stevenson MD sent at 7/25/2023  5:00 PM CDT -----  Potassium level staying too high.  I would recommend that we cut lisinopril down to 40 mg daily (I have that he is taking 60 mg right now) and double HCTZ to 25 mg daily.  I have sent new prescription for HCTZ but can take 2 tabs of what he has at home.  Will need recheck lab in 1-2 weeks - order placed.  Please confirm with patient that he is no longer using lasix/potassium.  Thanks, GRACIA Stevenson

## 2023-08-02 ENCOUNTER — TELEPHONE (OUTPATIENT)
Dept: ENDOCRINOLOGY | Facility: CLINIC | Age: 73
End: 2023-08-02
Payer: MEDICARE

## 2023-08-02 ENCOUNTER — LAB (OUTPATIENT)
Dept: LAB | Facility: HOSPITAL | Age: 73
End: 2023-08-02
Payer: MEDICARE

## 2023-08-02 DIAGNOSIS — I10 ESSENTIAL HYPERTENSION: ICD-10-CM

## 2023-08-02 DIAGNOSIS — E11.649 TYPE 2 DIABETES MELLITUS WITH HYPOGLYCEMIA WITHOUT COMA, WITH LONG-TERM CURRENT USE OF INSULIN: ICD-10-CM

## 2023-08-02 DIAGNOSIS — Z79.4 TYPE 2 DIABETES MELLITUS WITH HYPOGLYCEMIA WITHOUT COMA, WITH LONG-TERM CURRENT USE OF INSULIN: ICD-10-CM

## 2023-08-02 PROCEDURE — 36415 COLL VENOUS BLD VENIPUNCTURE: CPT

## 2023-08-02 PROCEDURE — 80053 COMPREHEN METABOLIC PANEL: CPT

## 2023-08-02 RX ORDER — INSULIN GLARGINE 100 [IU]/ML
70 INJECTION, SOLUTION SUBCUTANEOUS DAILY
Qty: 30 ML | Refills: 0
Start: 2023-08-02 | End: 2023-08-04 | Stop reason: SDUPTHER

## 2023-08-03 ENCOUNTER — TELEPHONE (OUTPATIENT)
Dept: FAMILY MEDICINE CLINIC | Facility: CLINIC | Age: 73
End: 2023-08-03
Payer: MEDICARE

## 2023-08-03 LAB
ALBUMIN SERPL-MCNC: 4.5 G/DL (ref 3.5–5.2)
ALBUMIN/GLOB SERPL: 2.1 G/DL
ALP SERPL-CCNC: 62 U/L (ref 39–117)
ALT SERPL W P-5'-P-CCNC: 18 U/L (ref 1–41)
ANION GAP SERPL CALCULATED.3IONS-SCNC: 9 MMOL/L (ref 5–15)
AST SERPL-CCNC: 15 U/L (ref 1–40)
BILIRUB SERPL-MCNC: 0.3 MG/DL (ref 0–1.2)
BUN SERPL-MCNC: 28 MG/DL (ref 8–23)
BUN/CREAT SERPL: 23.5 (ref 7–25)
CALCIUM SPEC-SCNC: 9.6 MG/DL (ref 8.6–10.5)
CHLORIDE SERPL-SCNC: 105 MMOL/L (ref 98–107)
CO2 SERPL-SCNC: 25 MMOL/L (ref 22–29)
CREAT SERPL-MCNC: 1.19 MG/DL (ref 0.76–1.27)
EGFRCR SERPLBLD CKD-EPI 2021: 64.1 ML/MIN/1.73
GLOBULIN UR ELPH-MCNC: 2.1 GM/DL
GLUCOSE SERPL-MCNC: 236 MG/DL (ref 65–99)
POTASSIUM SERPL-SCNC: 5 MMOL/L (ref 3.5–5.2)
PROT SERPL-MCNC: 6.6 G/DL (ref 6–8.5)
SODIUM SERPL-SCNC: 139 MMOL/L (ref 136–145)

## 2023-08-03 NOTE — PROGRESS NOTES
Per Dr. Stevenson, Ms. Zavaleta has been called with recent lab results & recommendations.  Continue current medications and follow-up as planned or sooner if any problems.

## 2023-08-04 ENCOUNTER — TELEPHONE (OUTPATIENT)
Dept: ENDOCRINOLOGY | Facility: CLINIC | Age: 73
End: 2023-08-04
Payer: MEDICARE

## 2023-08-04 DIAGNOSIS — E11.649 TYPE 2 DIABETES MELLITUS WITH HYPOGLYCEMIA WITHOUT COMA, WITH LONG-TERM CURRENT USE OF INSULIN: ICD-10-CM

## 2023-08-04 DIAGNOSIS — Z79.4 TYPE 2 DIABETES MELLITUS WITH HYPOGLYCEMIA WITHOUT COMA, WITH LONG-TERM CURRENT USE OF INSULIN: ICD-10-CM

## 2023-08-04 RX ORDER — INSULIN GLARGINE 100 [IU]/ML
70 INJECTION, SOLUTION SUBCUTANEOUS DAILY
Qty: 30 ML | Refills: 0
Start: 2023-08-04

## 2023-08-07 ENCOUNTER — TELEPHONE (OUTPATIENT)
Dept: ENDOCRINOLOGY | Facility: CLINIC | Age: 73
End: 2023-08-07
Payer: MEDICARE

## 2023-08-07 DIAGNOSIS — R19.7 DIARRHEA, UNSPECIFIED TYPE: ICD-10-CM

## 2023-08-07 RX ORDER — MONTELUKAST SODIUM 4 MG/1
2 TABLET, CHEWABLE ORAL 2 TIMES DAILY
Qty: 120 TABLET | Refills: 3 | Status: SHIPPED | OUTPATIENT
Start: 2023-08-07

## 2023-08-07 NOTE — TELEPHONE ENCOUNTER
Patient's wife called and said that [facility] told them they didn't have his prescription. [facility] will be called directly and given the prescription over the phone.

## 2023-08-14 ENCOUNTER — TELEPHONE (OUTPATIENT)
Dept: FAMILY MEDICINE CLINIC | Facility: CLINIC | Age: 73
End: 2023-08-14
Payer: MEDICARE

## 2023-08-14 ENCOUNTER — TELEMEDICINE (OUTPATIENT)
Dept: FAMILY MEDICINE CLINIC | Facility: CLINIC | Age: 73
End: 2023-08-14
Payer: MEDICARE

## 2023-08-14 VITALS — HEIGHT: 72 IN | BODY MASS INDEX: 29.7 KG/M2

## 2023-08-14 DIAGNOSIS — U07.1 COVID-19 VIRUS INFECTION: Primary | ICD-10-CM

## 2023-08-14 PROCEDURE — 3052F HG A1C>EQUAL 8.0%<EQUAL 9.0%: CPT | Performed by: FAMILY MEDICINE

## 2023-08-14 PROCEDURE — 99212 OFFICE O/P EST SF 10 MIN: CPT | Performed by: FAMILY MEDICINE

## 2023-08-14 NOTE — TELEPHONE ENCOUNTER
Patient has been called, Dr. Stevenson will do Doxy visits with them this afternoon  Just an FYI, you don't have to do anything with them

## 2023-08-14 NOTE — TELEPHONE ENCOUNTER
I will be glad to schedule this. Please just let me know what time to put him in.  Dr. Stevenson works half a day tomorrow and she has no opening today.

## 2023-08-14 NOTE — TELEPHONE ENCOUNTER
Ms. Talia Zavaleta called and stated that Mr. Beka Zavaleta has tested positive for Covid. They went to [facility] to be tested.  The clinic wanted her to call and and see if Dr. Stevenson wanted them to take Paxlobid and what medication the don't need to take.    Pt call back [phone]

## 2023-08-28 RX ORDER — DOXAZOSIN MESYLATE 4 MG/1
TABLET ORAL
Qty: 90 TABLET | Refills: 3 | Status: SHIPPED | OUTPATIENT
Start: 2023-08-28

## 2023-08-30 DIAGNOSIS — E11.65 TYPE 2 DIABETES MELLITUS WITH HYPERGLYCEMIA, WITH LONG-TERM CURRENT USE OF INSULIN: ICD-10-CM

## 2023-08-30 DIAGNOSIS — Z79.4 TYPE 2 DIABETES MELLITUS WITH HYPERGLYCEMIA, WITH LONG-TERM CURRENT USE OF INSULIN: ICD-10-CM

## 2023-08-30 RX ORDER — SITAGLIPTIN 100 MG/1
TABLET, FILM COATED ORAL
Qty: 90 TABLET | Refills: 0 | Status: SHIPPED | OUTPATIENT
Start: 2023-08-30

## 2023-08-30 NOTE — PROGRESS NOTES
Chief Complaint  URI (Tested positive for COVID 19 infection)    Mode of Visit: Video  Location of patient: home  Location of provider: Clinic  You have chosen to receive care through a telehealth visit: yes  The visit included audio and video interaction. No technical issues occurred during this visit.       Subjective    History of Present Illness {CC  Problem List  Visit  Diagnosis   Encounters  Notes  Medications  Labs  Result Review Imaging  Media :23}     Beka Zavaleta presents to [facility] for     Chief Complaint   Patient presents with    URI     Tested positive for COVID 19 infection      Patient seen today for COVID 19 infection.  Tested positive at urgent care, and recommended to contact our office to discuss treatment with Paxlovid.  Symptoms started 3 days ago.  Has already started to improve some.  Does not have a way to check oxygen saturations at home, but does not feel like he is having any breathing difficulties.  Has been taking Mucinex.  No fever.      Has chronic medical problems including hypertension, type 2 diabetes, GERD and trigeminal neuralgia.     Current Outpatient Medications:     amLODIPine (NORVASC) 10 MG tablet, Take 1 tablet by mouth Daily., Disp: 90 tablet, Rfl: 3    aspirin 81 MG tablet, Take 1 tablet by mouth Daily., Disp: , Rfl:     atorvastatin (LIPITOR) 80 MG tablet, Take 1 tablet by mouth Daily., Disp: 90 tablet, Rfl: 3    carBAMazepine XR (TEGretol  XR) 100 MG 12 hr tablet, TAKE 4 TABS BY MOUTH IN THE MORNING, 3 TABS AT LUNCH AND 3 TABS AT BEDTIME *PA REQUESTED 3/14/22* (Patient taking differently: 5 tablets. TAKE 5 TAB DAILY 3 IN THE MORNING AND 2 IN THE AFTERNOON), Disp: 300 tablet, Rfl: 5    cholecalciferol (VITAMIN D3) 25 MCG (1000 UT) tablet, Take 1 tablet by mouth Daily., Disp: , Rfl:     clopidogrel (PLAVIX) 75 MG tablet, Take 1 tablet by mouth Daily., Disp: 90 tablet, Rfl: 3    colestipol (COLESTID)  "1 g tablet, TAKE 2 TABLETS BY MOUTH 2 (TWO) TIMES A DAY., Disp: 120 tablet, Rfl: 3    Continuous Blood Gluc Sensor (FreeStyle Gabriella 14 Day Sensor) mis, 1 each Every 14 (Fourteen) Days., Disp: 6 each, Rfl: 1    Embrace Lancets Ultra Thin 30G, Use with testing blood sugars 3-4 times daily as instructed, Disp: 400 each, Rfl: 3    folic acid-vit B6-vit B12 (FOLGARD) 2.2-25-1 MG tablet tablet, Take 1 tablet by mouth Daily., Disp: , Rfl:     furosemide (LASIX) 20 MG tablet, TAKE ONE TABLET BY MOUTH DAILY FOR 3 DAYS THEN TAKE AS NEEDED FOR INCREASED SHORTNESS OF AIR EDEMA OR 2 3 LB WEIGHT GAIN IN 24 HOURS, Disp: , Rfl: 3    glucose blood test strip, Test Blood Sugars up to 4 times daily as instructed., Disp: 360 each, Rfl: 3    hydroCHLOROthiazide (HYDRODIURIL) 25 MG tablet, Take 1 tablet by mouth Daily., Disp: 30 tablet, Rfl: 2    insulin aspart (NovoLOG FlexPen) 100 UNIT/ML solution pen-injector sc pen, Up to 20 units TID before each meal, Disp: 30 mL, Rfl: 3    insulin glargine (Lantus) 100 UNIT/ML injection, Inject 70 Units under the skin into the appropriate area as directed Daily., Disp: 30 mL, Rfl: 0    Insulin Syringe-Needle U-100 30G X 7/16\" 1 ML misc, daily., Disp: , Rfl:     isosorbide mononitrate (IMDUR) 30 MG 24 hr tablet, Take 1 tablet by mouth Daily. Take 1/2 pill once a day, Disp: 45 tablet, Rfl: 3    lisinopril (PRINIVIL,ZESTRIL) 40 MG tablet, 1 tablet in the morning and .5 at night, Disp: 180 tablet, Rfl: 3    magnesium oxide (MAGOX) 400 (241.3 MG) MG tablet tablet, Take 1 tablet by mouth Daily., Disp: , Rfl:     metFORMIN (GLUCOPHAGE) 500 MG tablet, TAKE 1 TABLET BY MOUTH 2 (TWO) TIMES A DAY WITH MEALS. *EQUIPP/HUMANA*, Disp: 180 tablet, Rfl: 3    metoprolol succinate XL (Toprol XL) 100 MG 24 hr tablet, Take 1 tablet by mouth Daily., Disp: 90 tablet, Rfl: 3    Multiple Vitamins-Minerals (ICAPS PO), Take  by mouth daily., Disp: , Rfl:     nitroglycerin (NITROSTAT) 0.4 MG SL tablet, Place 1 tablet under " "the tongue As Needed for Chest Pain. repeat X 1 in 5 min. if not relieved and then got to ER or call an ambulance, Disp: , Rfl:     omeprazole (priLOSEC) 40 MG capsule, Take 1 capsule by mouth Daily., Disp: 90 capsule, Rfl: 3    potassium chloride (K-DUR) 10 MEQ CR tablet, TAKE ONE TABLET BY MOUTH DAILY FOR 3 DAYS AND THEN AS NEEDED WITH LASIX, Disp: , Rfl: 3    ranolazine (RANEXA) 500 MG 12 hr tablet, Take 2 tablets by mouth 2 (Two) Times a Day., Disp: , Rfl:     SITagliptin (Januvia) 100 MG tablet, Take 1 tablet by mouth Daily., Disp: 90 tablet, Rfl: 1    verapamil SR (CALAN-SR) 120 MG CR tablet, Take 3 tablets by mouth., Disp: , Rfl:     doxazosin (CARDURA) 4 MG tablet, TAKE 1 TABLET EVERY NIGHT, Disp: 90 tablet, Rfl: 3     Objective       Vital Signs:   Ht 182.9 cm (72\")   BMI 29.70 kg/mý     Physical Exam  Constitutional:       General: He is not in acute distress.     Appearance: He is well-developed.   HENT:      Head: Normocephalic and atraumatic.   Pulmonary:      Effort: Pulmonary effort is normal. No respiratory distress.   Neurological:      Mental Status: He is alert and oriented to person, place, and time.      Result Review :{ Labs  Result Review  Imaging  Med Tab  Media :23}   The following data was reviewed by: Gisela Stevenson MD on 08/14/2023    Common labs          8/2/2023    11:11   Common Labs   Glucose 236    BUN 28    Creatinine 1.19    Sodium 139    Potassium 5.0    Chloride 105    Calcium 9.6    Albumin 4.5    Total Bilirubin 0.3    Alkaline Phosphatase 62    AST (SGOT) 15    ALT (SGPT) 18                Assessment and Plan {CC Problem List  Visit Diagnosis  ROS  Review (Popup)  Health Maintenance  Quality  BestPractice  Medications  SmartSets  SnapShot Encounters  Media :23}   Diagnoses and all orders for this visit:    1. COVID-19 virus infection (Primary)       Patient seen today for COVID 19 infection  Discussed risks and benefits of Paxlovid for treatment, including " medication adjsutments that would need to be made if this medication started  Patient has decided he would not like to use Paxlovid for treatment at this time  Recommend continuation of fluids and mucinex  Tylenol if needed for body aches or fever  Monitor respiratory status, should be seen in ER with any breathing difficulties      I spent 10 total minutes caring for Beka Zavaleta today.  Time spent assessing patient, creating treatment plan, counseling, coordination of care and note completion on date of service.        Follow Up {Instructions Charge Capture  Follow-up Communications :23}   Return if symptoms worsen or fail to improve, for Next scheduled follow up.  Patient was given instructions and counseling regarding his condition or for health maintenance advice. Please see specific information pulled into the AVS if appropriate.            This document has been electronically signed by Gisela Stevenson MD

## 2023-09-06 ENCOUNTER — DOCUMENTATION (OUTPATIENT)
Dept: ENDOCRINOLOGY | Facility: CLINIC | Age: 73
End: 2023-09-06
Payer: MEDICARE

## 2023-09-12 DIAGNOSIS — E11.649 TYPE 2 DIABETES MELLITUS WITH HYPOGLYCEMIA WITHOUT COMA, WITH LONG-TERM CURRENT USE OF INSULIN: ICD-10-CM

## 2023-09-12 DIAGNOSIS — Z79.4 TYPE 2 DIABETES MELLITUS WITH HYPOGLYCEMIA WITHOUT COMA, WITH LONG-TERM CURRENT USE OF INSULIN: ICD-10-CM

## 2023-09-12 RX ORDER — INSULIN GLARGINE 100 [IU]/ML
INJECTION, SOLUTION SUBCUTANEOUS
Qty: 30 ML | Refills: 0 | Status: SHIPPED | OUTPATIENT
Start: 2023-09-12

## 2025-06-12 NOTE — PROGRESS NOTES
"Chief Complaint  Diabetes    Subjective          Beka Zavaleta presents to [facility]  History of Present Illness     In office visit      Referring provider Gisela Stevenson MD      71 year old male presents for follow up          Reason -- diabetes mellitus type 2      Duration-- diagnosed before 1990      Context --- presented for work up for another problem and found to have diabetes      Timing constant      Quality  Not controlled      Severity moderate      Macrovascular complications  None      Microvascular complications --mild neuropathy, no DR , no renal disease      Current diabetes regimen         Insulin, oral medication      Current glucose monitoring      fingerstick's      Checks 4 times daily      History of low sugars     Am still 200    Is at goal with meals except after breakfast        Has the maco but not using --needs training       Review of Systems - General ROS: negative           Objective   Vital Signs:   /70   Pulse 87   Ht 182.9 cm (72\")   Wt 102 kg (225 lb)   SpO2 96%   BMI 30.52 kg/m²     Physical Exam  Constitutional:       Appearance: Normal appearance.   Cardiovascular:      Rate and Rhythm: Regular rhythm.      Heart sounds: Normal heart sounds.   Pulmonary:      Breath sounds: Normal breath sounds.   Musculoskeletal:      Cervical back: Normal range of motion.   Neurological:      Mental Status: He is alert.        Result Review :   The following data was reviewed by: EMMANUELLE Wallace on 04/15/2022:  Common labs    Common Labsle 6/4/21 6/4/21 9/7/21 3/8/22 3/8/22 3/8/22 3/8/22    0851 0851  0902 0902 0902 0902   Glucose      182 (A)    BUN      16    Creatinine      0.99    Sodium      140    Potassium      5.1    Chloride      102    Calcium      10.2    Albumin      4.80    Total Bilirubin      0.3    Alkaline Phosphatase      80    AST (SGOT)      17    ALT (SGPT)      24    WBC 6.44   5.45      Hemoglobin " 6/12/2025      Cami Turner  [address]  [place] [address]      Dear Colleague,    Thank you for referring your patient, Cami Turner, to the [facility]. Please see a copy of my visit note below.    ESTABLISHED PATIENT NEUROLOGY NOTE    DATE OF VISIT: 6/12/2025  CLINIC LOCATION: [facility]  MRN: [id number]  PATIENT NAME: Cami Turner  YOB: 1945    REASON FOR VISIT:   Chief Complaint   Patient presents with     Follow Up     stable     SUBJECTIVE:                                                      HISTORY OF PRESENT ILLNESS: Patient is here to follow up regarding mild cognitive impairment.  The last visit was on 12/12/2024.  Please refer to my initial/other prior notes for further information.  Accompanied by her caregiver.    Since the last visit, the patient reports that her memory is stable.  Caregiver agrees.  The patient is on B12 replacement.  She denies new neurological symptoms.    Recommended laboratory evaluation to check for possibility of Alzheimer's disease was not done.  EXAM:                                                    Physical Exam:   Vitals: /77   Pulse 81   LMP  (LMP Unknown)   SpO2 96%     General: pt is in NAD, cooperative.  Skin: normal turgor, moist mucous membranes, no lesions/rashes noticed.  HEENT: ATNC, white sclera, normal conjunctiva.  Respiratory: Symmetric lung excursion, no accessory respiratory muscle use.  Abdomen: Non distended.  Neurological: awake, cooperative, follows commands, face is symmetric, tongue is midline, equal moves all extremities, no dysmetria bilaterally.  Gait was not checked.  ASSESSMENT AND PLAN:                                                    Assessment: 79 year old female patient presents for follow-up of mild cognitive impairment and vitamin B12 deficiency.  The patient did not complete the labs to check for possibility of Alzheimer's disease, and I  13.5   13.8      Hematocrit 39.2   40.3      Platelets 137 (A)   120 (A)      Total Cholesterol     160     Triglycerides     254 (A)     HDL Cholesterol     41     LDL Cholesterol      77     Hemoglobin A1C  7.65 (A) 7.70 (A)    9.10 (A)   (A) Abnormal value                      Assessment and Plan    Diagnoses and all orders for this visit:    1. Type 2 diabetes mellitus with hyperglycemia, with long-term current use of insulin (HCC) (Primary)    2. Essential hypertension    3. Mixed hyperlipidemia           Glycemic Management:     Diabetes mellitus type 2            Lab Results   Component Value Date     HGBA1C 9.10 (H) 03/08/2022         Side effects from Trulicity and jardiance          Taking lantus 175  once daily - stop and change        Taking novolog --stop change to the following       using sliding scale      Taking metformin 500 mg 2 daily --keep      Taking januvia 100 mg --keep          Lantus 60 units once daily increase to 65 units      novolog for meals TID      4 units per 15 grams of CHO            For small carb meal give  12 units      For regular carb meal give 16 units      For large carb meal give 20 units --increase to 24 units for breakfast         Sliding scale      151-200     2 units   201-250     4 units   251-300    6 units   Above 301   8 units                           Goals for sugar     Fasting and before meals 80 to 130     2 hours after meals 180 or less        Aim for 60  grams of carbohydrate per meal     Aim for 15 grams of carbohydrate per snack                         Microvascular Complications Monitoring         Last eye exam--yearly , no DR      Neuropathy --mild         Lipid Management:               Lab Results   Component Value Date     LDL 77 03/08/2022               Taking lipitor 80 mg one at night            Blood Pressure Management:        Taking norvasc 10 mg daily            Thyroid Health           Lab Results   Component Value Date     TSH 0.574 11/26/2019  encouraged her to do so.  We also added phosphorylated tau 217/beta amyloid 42 ratio.  Her MoCA scores' range is 18-20/30.  Will plan to repeat MoCA next time she comes in 6 months.  We also discussed lifestyle modifications to help cognitive function.    Diagnoses:    ICD-10-CM    1. Mild cognitive impairment  G31.84       2. Vitamin B12 deficiency (non anemic)  E53.8         Plan: At today's visit we thoroughly discussed current symptoms, available treatment options, and the plan.    We decided to pursue recommended labs while she works on lifestyle modifications.  I provided the contact phone number for walk-in lab at [facility].    Next follow-up appointment is in the next 6 months or earlier if needed.    Total Time: 21 minutes spent on the date of the encounter doing chart review, history and exam, documentation and further activities per the note.    Gilson Casarez MD  [facility]  (Chart documentation was completed in part with Dragon voice-recognition software. Even though reviewed, some grammatical, spelling, and word errors may remain.)    Again, thank you for allowing me to participate in the care of your patient.        Sincerely,        Gilson Casarez MD    Electronically signed               B[facility] Health               Lab Results   Component Value Date     CALCIUM 10.2 03/08/2022     PHOS 3.7 11/12/2015               Weight Management:        Obesity        Patient's Body mass index is 30.52 kg/m². indicating that he is obese (BMI >30). Obesity-related health conditions include the following: diabetes mellitus. Obesity is unchanged. BMI is is above average; no BMI management plan is appropriate. We discussed portion control and increasing exercise..        Preventive Care:      Non smoker                              Follow Up   Return in about 3 months (around 7/15/2022) for Recheck.  Patient was given instructions and counseling regarding his condition or for health maintenance advice. Please see specific information pulled into the AVS if appropriate.         This document has been electronically signed by EMMANUELLE Wallace on April 15, 2022 15:30 CDT.       current weight

## (undated) DEVICE — Device: Brand: DISPOSABLE ELECTROSURGICAL SNARE

## (undated) DEVICE — SINGLE-USE BIOPSY FORCEPS: Brand: RADIAL JAW 4

## (undated) DEVICE — TRAP SXN POLYP QUICKCATCH LF

## (undated) DEVICE — CANN SMPL SOFTECH BIFLO ETCO2 A/M 7FT